# Patient Record
Sex: FEMALE | Race: WHITE | NOT HISPANIC OR LATINO | Employment: OTHER | URBAN - NONMETROPOLITAN AREA
[De-identification: names, ages, dates, MRNs, and addresses within clinical notes are randomized per-mention and may not be internally consistent; named-entity substitution may affect disease eponyms.]

---

## 2017-01-20 ENCOUNTER — TRANSCRIBE ORDERS (OUTPATIENT)
Dept: CARDIOLOGY | Facility: CLINIC | Age: 72
End: 2017-01-20

## 2017-01-20 DIAGNOSIS — I35.1 NONRHEUMATIC AORTIC VALVE INSUFFICIENCY: ICD-10-CM

## 2017-01-20 DIAGNOSIS — I35.9 AVD (AORTIC VALVE DISEASE): Primary | ICD-10-CM

## 2017-01-20 DIAGNOSIS — R00.2 PALPITATIONS: ICD-10-CM

## 2017-01-20 DIAGNOSIS — I47.1 SVT (SUPRAVENTRICULAR TACHYCARDIA) (HCC): ICD-10-CM

## 2017-03-15 DIAGNOSIS — E78.5 HYPERLIPIDEMIA, UNSPECIFIED HYPERLIPIDEMIA TYPE: Primary | ICD-10-CM

## 2017-03-15 DIAGNOSIS — I49.9 CARDIAC ARRHYTHMIA, UNSPECIFIED CARDIAC ARRHYTHMIA TYPE: ICD-10-CM

## 2017-03-15 LAB
ALBUMIN SERPL-MCNC: 4.2 G/DL (ref 3.2–5.5)
ALBUMIN/GLOB SERPL: 1.1 G/DL (ref 1–3)
ALP SERPL-CCNC: 111 U/L (ref 15–121)
ALT SERPL W P-5'-P-CCNC: 15 U/L (ref 10–60)
ANION GAP SERPL CALCULATED.3IONS-SCNC: 9 MMOL/L (ref 5–15)
AST SERPL-CCNC: 21 U/L (ref 10–60)
BASOPHILS # BLD AUTO: 0.03 10*3/MM3 (ref 0–0.2)
BASOPHILS NFR BLD AUTO: 0.5 % (ref 0–2)
BILIRUB SERPL-MCNC: 0.8 MG/DL (ref 0.2–1)
BUN BLD-MCNC: 12 MG/DL (ref 8–25)
BUN/CREAT SERPL: 15 (ref 7–25)
CALCIUM SPEC-SCNC: 10.1 MG/DL (ref 8.4–10.8)
CHLORIDE SERPL-SCNC: 105 MMOL/L (ref 100–112)
CHOLEST SERPL-MCNC: 235 MG/DL (ref 150–200)
CO2 SERPL-SCNC: 27 MMOL/L (ref 20–32)
CREAT BLD-MCNC: 0.8 MG/DL (ref 0.4–1.3)
DEPRECATED RDW RBC AUTO: 42.6 FL (ref 36.4–46.3)
EOSINOPHIL # BLD AUTO: 0.14 10*3/MM3 (ref 0–0.7)
EOSINOPHIL NFR BLD AUTO: 2.5 % (ref 0–7)
ERYTHROCYTE [DISTWIDTH] IN BLOOD BY AUTOMATED COUNT: 12.6 % (ref 11.5–14.5)
GFR SERPL CREATININE-BSD FRML MDRD: 71 ML/MIN/1.73 (ref 45–104)
GLOBULIN UR ELPH-MCNC: 3.7 GM/DL (ref 2.5–4.6)
GLUCOSE BLD-MCNC: 92 MG/DL (ref 70–100)
HCT VFR BLD AUTO: 39.2 % (ref 35–45)
HDLC SERPL-MCNC: 95 MG/DL (ref 35–100)
HGB BLD-MCNC: 12.8 G/DL (ref 12–15.5)
LDLC SERPL CALC-MCNC: 116 MG/DL
LDLC/HDLC SERPL: 1.23 {RATIO}
LYMPHOCYTES # BLD AUTO: 1.97 10*3/MM3 (ref 0.6–4.2)
LYMPHOCYTES NFR BLD AUTO: 34.6 % (ref 10–50)
MCH RBC QN AUTO: 31.1 PG (ref 26.5–34)
MCHC RBC AUTO-ENTMCNC: 32.7 G/DL (ref 31.4–36)
MCV RBC AUTO: 95.4 FL (ref 80–98)
MONOCYTES # BLD AUTO: 0.74 10*3/MM3 (ref 0–0.9)
MONOCYTES NFR BLD AUTO: 13 % (ref 0–12)
NEUTROPHILS # BLD AUTO: 2.82 10*3/MM3 (ref 2–8.6)
NEUTROPHILS NFR BLD AUTO: 49.4 % (ref 37–80)
PLATELET # BLD AUTO: 319 10*3/MM3 (ref 150–450)
PMV BLD AUTO: 10 FL (ref 8–12)
POTASSIUM BLD-SCNC: 3.8 MMOL/L (ref 3.4–5.4)
PROT SERPL-MCNC: 7.9 G/DL (ref 6.7–8.2)
RBC # BLD AUTO: 4.11 10*6/MM3 (ref 3.77–5.16)
SODIUM BLD-SCNC: 141 MMOL/L (ref 134–146)
TRIGL SERPL-MCNC: 118 MG/DL (ref 35–160)
VLDLC SERPL-MCNC: 23.6 MG/DL
WBC NRBC COR # BLD: 5.7 10*3/MM3 (ref 3.2–9.8)

## 2017-03-15 PROCEDURE — 36415 COLL VENOUS BLD VENIPUNCTURE: CPT | Performed by: INTERNAL MEDICINE

## 2017-03-15 PROCEDURE — 80053 COMPREHEN METABOLIC PANEL: CPT | Performed by: INTERNAL MEDICINE

## 2017-03-15 PROCEDURE — 85025 COMPLETE CBC W/AUTO DIFF WBC: CPT | Performed by: INTERNAL MEDICINE

## 2017-03-15 PROCEDURE — 84443 ASSAY THYROID STIM HORMONE: CPT | Performed by: INTERNAL MEDICINE

## 2017-03-15 PROCEDURE — 80061 LIPID PANEL: CPT | Performed by: INTERNAL MEDICINE

## 2017-03-16 LAB — TSH SERPL DL<=0.05 MIU/L-ACNC: 5.22 MIU/ML (ref 0.46–4.68)

## 2017-03-22 ENCOUNTER — OFFICE VISIT (OUTPATIENT)
Dept: FAMILY MEDICINE CLINIC | Facility: CLINIC | Age: 72
End: 2017-03-22

## 2017-03-22 VITALS
SYSTOLIC BLOOD PRESSURE: 140 MMHG | DIASTOLIC BLOOD PRESSURE: 70 MMHG | HEART RATE: 80 BPM | HEIGHT: 63 IN | TEMPERATURE: 97.9 F | BODY MASS INDEX: 24.84 KG/M2 | WEIGHT: 140.2 LBS

## 2017-03-22 DIAGNOSIS — I10 ESSENTIAL HYPERTENSION: Primary | ICD-10-CM

## 2017-03-22 DIAGNOSIS — K21.9 GASTROESOPHAGEAL REFLUX DISEASE, ESOPHAGITIS PRESENCE NOT SPECIFIED: ICD-10-CM

## 2017-03-22 DIAGNOSIS — E78.5 HYPERLIPIDEMIA, UNSPECIFIED HYPERLIPIDEMIA TYPE: ICD-10-CM

## 2017-03-22 DIAGNOSIS — M85.80 OSTEOPENIA: ICD-10-CM

## 2017-03-22 DIAGNOSIS — M17.12 OSTEOARTHRITIS OF LEFT KNEE, UNSPECIFIED OSTEOARTHRITIS TYPE: ICD-10-CM

## 2017-03-22 DIAGNOSIS — F41.1 GENERALIZED ANXIETY DISORDER: ICD-10-CM

## 2017-03-22 PROCEDURE — 99214 OFFICE O/P EST MOD 30 MIN: CPT | Performed by: INTERNAL MEDICINE

## 2017-03-22 NOTE — PROGRESS NOTES
Subjective     Supriya Alberto is a 71 y.o. female.     History of Present Illness   Supriya is here for annual followup of high cholesterol, PSVT, GERD/gastritis, osteopenia, generalized anxiety disorder, and dysthymia, among other issues.  Next DEXA will be due 3/2018.    PSVT is well controlled.  No further syncopal episodes.  Dr. Aguirre performed successful pathway ablation 2/2015.  She continues on metoprolol.  Dr. Aguirre continues to follow her annually.  Dr. Hines performed extensive ischemic workup prior to the pathway ablation.  Left heart catheter was unremarkable.    Her blood pressure is adequately controlled today.  Heart rate is normal.  Her weight is down 5 pounds in the past year.    She reports increasing arthritic symptoms of the left knee.  She denies any falls or trauma or instability.  She uses ibuprofen episodically with fairly good results.  Left knee pain is moderate in intensity and episodic.  We discussed trying Osteo Bi-Flex for symptom relief.    She desires mammograms to be a two-year intervals.    Mammogram will be due in spring 2018.    Her labs are all reviewed with results listed below.  Although total cholesterol is elevated, her ratios are excellent due to high HDL cholesterol.  Triglycerides are normal.  CMP and CBC are unremarkable.  TSH is slightly elevated at 5.2.    Review of Systems   Constitutional: Negative for chills, fatigue and fever.   HENT: Negative for congestion, ear pain, postnasal drip, sinus pressure and sore throat.    Respiratory: Negative for cough, shortness of breath and wheezing.    Cardiovascular: Negative for chest pain, palpitations and leg swelling.   Gastrointestinal: Negative for abdominal pain, blood in stool, constipation, diarrhea, nausea and vomiting.   Endocrine: Negative for cold intolerance, heat intolerance, polydipsia and polyuria.   Genitourinary: Negative for dysuria, frequency, hematuria and urgency.   Musculoskeletal: Arthralgias: left knee  "pain.   Skin: Negative for rash.   Neurological: Negative for syncope and weakness.       Objective     /70  Pulse 80  Temp 97.9 °F (36.6 °C) (Oral)   Ht 63\" (160 cm)  Wt 140 lb 3.2 oz (63.6 kg)  BMI 24.84 kg/m2    Physical Exam   Constitutional: She is oriented to person, place, and time. She appears well-developed and well-nourished. No distress.   HENT:   Head: Normocephalic and atraumatic.   Nose: Right sinus exhibits no maxillary sinus tenderness and no frontal sinus tenderness. Left sinus exhibits no maxillary sinus tenderness and no frontal sinus tenderness.   Mouth/Throat: Uvula is midline, oropharynx is clear and moist and mucous membranes are normal. No oral lesions. No tonsillar exudate.   Eyes: Conjunctivae and EOM are normal. Pupils are equal, round, and reactive to light.   Neck: Trachea normal. Neck supple. No JVD present. Carotid bruit is not present. No tracheal deviation present. No thyroid mass and no thyromegaly present.   Cardiovascular: Normal rate, regular rhythm and normal heart sounds.   No extrasystoles are present. PMI is not displaced.    No murmur heard.  Pulmonary/Chest: Effort normal and breath sounds normal. No accessory muscle usage. No respiratory distress. She has no decreased breath sounds. She has no wheezes. She has no rhonchi. She has no rales.   Abdominal: Soft. Bowel sounds are normal. She exhibits no distension. There is no hepatosplenomegaly. There is no tenderness.   Mildly overweight abdomen.   Musculoskeletal:   Exam of the left knee reveals arthritic changes and some crepitance, but full range of motion.  No warmth, erythema, or effusion.       Vascular Status -  Her exam exhibits right foot vasculature normal. Her exam exhibits no right foot edema. Her exam exhibits left foot vasculature normal. Her exam exhibits no left foot edema.  Lymphadenopathy:     She has no cervical adenopathy.   Neurological: She is alert and oriented to person, place, and time. No " cranial nerve deficit. Coordination normal.   Skin: Skin is warm, dry and intact. No rash noted. No cyanosis. Nails show no clubbing.   Psychiatric: She has a normal mood and affect. Her speech is normal and behavior is normal. Thought content normal.   Vitals reviewed.      PHQ-9 Depression Screening 3/22/2017   Little interest or pleasure in doing things 1   Feeling down, depressed, or hopeless 1   Trouble falling or staying asleep, or sleeping too much 1   Feeling tired or having little energy 1   Poor appetite or overeating 0   Feeling bad about yourself - or that you are a failure or have let yourself or your family down 0   Trouble concentrating on things, such as reading the newspaper or watching television 0   Moving or speaking so slowly that other people could have noticed. Or the opposite - being so fidgety or restless that you have been moving around a lot more than usual 0   Thoughts that you would be better off dead, or of hurting yourself in some way 1   PHQ-9 Total Score 5   If you checked off any problems, how difficult have these problems made it for you to do your work, take care of things at home, or get along with other people? Not difficult at all       Assessment/Plan   Continue current medications.  Continue the weight loss efforts.  Pursue regular exercise.    X-rays of the left knee today as the patient leaves.  Try Osteo Bi-Flex.  She may continue to use Tylenol or OTC NSAIDs episodically for symptom relief.    Return to clinic in 12 months with fasting labs prior.  She will be due for mammogram and DEXA in the spring of 2018.      Diagnoses and all orders for this visit:    Essential hypertension  -     CBC Auto Differential; Future  -     Comprehensive Metabolic Panel; Future  -     TSH; Future    Hyperlipidemia, unspecified hyperlipidemia type  -     CBC Auto Differential; Future  -     Comprehensive Metabolic Panel; Future  -     Lipid Panel; Future  -     TSH;  Future    Gastroesophageal reflux disease, esophagitis presence not specified  -     TSH; Future    Osteopenia  -     Vitamin D 25 Hydroxy; Future  -     TSH; Future  -     HM DEXA SCAN  -     Vitamin D 25 Hydroxy; Future    Generalized anxiety disorder  -     TSH; Future    Osteoarthritis of left knee, unspecified osteoarthritis type  -     XR Knee 3 View Left  -     TSH; Future      Orders Only on 03/15/2017   Component Date Value Ref Range Status   • Glucose 03/15/2017 92  70 - 100 mg/dL Final   • BUN 03/15/2017 12  8 - 25 mg/dL Final   • Creatinine 03/15/2017 0.80  0.40 - 1.30 mg/dL Final   • Sodium 03/15/2017 141  134 - 146 mmol/L Final   • Potassium 03/15/2017 3.8  3.4 - 5.4 mmol/L Final   • Chloride 03/15/2017 105  100 - 112 mmol/L Final   • CO2 03/15/2017 27.0  20.0 - 32.0 mmol/L Final   • Calcium 03/15/2017 10.1  8.4 - 10.8 mg/dL Final   • Total Protein 03/15/2017 7.9  6.7 - 8.2 g/dL Final   • Albumin 03/15/2017 4.20  3.20 - 5.50 g/dL Final   • ALT (SGPT) 03/15/2017 15  10 - 60 U/L Final   • AST (SGOT) 03/15/2017 21  10 - 60 U/L Final   • Alkaline Phosphatase 03/15/2017 111  15 - 121 U/L Final   • Total Bilirubin 03/15/2017 0.8  0.2 - 1.0 mg/dL Final   • eGFR Non African Amer 03/15/2017 71  45 - 104 mL/min/1.73 Final   • Globulin 03/15/2017 3.7  2.5 - 4.6 gm/dL Final   • A/G Ratio 03/15/2017 1.1  1.0 - 3.0 g/dL Final   • BUN/Creatinine Ratio 03/15/2017 15.0  7.0 - 25.0 Final   • Anion Gap 03/15/2017 9.0  5.0 - 15.0 mmol/L Final   • Total Cholesterol 03/15/2017 235* 150 - 200 mg/dL Final   • Triglycerides 03/15/2017 118  35 - 160 mg/dL Final   • HDL Cholesterol 03/15/2017 95  35 - 100 mg/dL Final   • LDL Cholesterol  03/15/2017 116  mg/dL Final   • VLDL Cholesterol 03/15/2017 23.6  mg/dL Final   • LDL/HDL Ratio 03/15/2017 1.23   Final   • TSH 03/15/2017 5.220* 0.460 - 4.680 mIU/mL Final   • WBC 03/15/2017 5.70  3.20 - 9.80 10*3/mm3 Final   • RBC 03/15/2017 4.11  3.77 - 5.16 10*6/mm3 Final   • Hemoglobin  03/15/2017 12.8  12.0 - 15.5 g/dL Final   • Hematocrit 03/15/2017 39.2  35.0 - 45.0 % Final   • MCV 03/15/2017 95.4  80.0 - 98.0 fL Final   • MCH 03/15/2017 31.1  26.5 - 34.0 pg Final   • MCHC 03/15/2017 32.7  31.4 - 36.0 g/dL Final   • RDW 03/15/2017 12.6  11.5 - 14.5 % Final   • RDW-SD 03/15/2017 42.6  36.4 - 46.3 fl Final   • MPV 03/15/2017 10.0  8.0 - 12.0 fL Final   • Platelets 03/15/2017 319  150 - 450 10*3/mm3 Final   • Neutrophil % 03/15/2017 49.4  37.0 - 80.0 % Final   • Lymphocyte % 03/15/2017 34.6  10.0 - 50.0 % Final   • Monocyte % 03/15/2017 13.0* 0.0 - 12.0 % Final   • Eosinophil % 03/15/2017 2.5  0.0 - 7.0 % Final   • Basophil % 03/15/2017 0.5  0.0 - 2.0 % Final   • Neutrophils, Absolute 03/15/2017 2.82  2.00 - 8.60 10*3/mm3 Final   • Lymphocytes, Absolute 03/15/2017 1.97  0.60 - 4.20 10*3/mm3 Final   • Monocytes, Absolute 03/15/2017 0.74  0.00 - 0.90 10*3/mm3 Final   • Eosinophils, Absolute 03/15/2017 0.14  0.00 - 0.70 10*3/mm3 Final   • Basophils, Absolute 03/15/2017 0.03  0.00 - 0.20 10*3/mm3 Final   ]

## 2017-04-03 RX ORDER — PAROXETINE HYDROCHLORIDE 20 MG/1
TABLET, FILM COATED ORAL
Qty: 30 TABLET | Refills: 0 | Status: SHIPPED | OUTPATIENT
Start: 2017-04-03 | End: 2017-05-03 | Stop reason: SDUPTHER

## 2017-04-03 RX ORDER — OMEPRAZOLE 40 MG/1
CAPSULE, DELAYED RELEASE ORAL
Qty: 30 CAPSULE | Refills: 0 | Status: SHIPPED | OUTPATIENT
Start: 2017-04-03 | End: 2017-05-03 | Stop reason: SDUPTHER

## 2017-04-12 ENCOUNTER — DOCUMENTATION (OUTPATIENT)
Dept: CARDIOLOGY | Facility: CLINIC | Age: 72
End: 2017-04-12

## 2017-04-14 ENCOUNTER — OFFICE VISIT (OUTPATIENT)
Dept: CARDIOLOGY | Facility: CLINIC | Age: 72
End: 2017-04-14

## 2017-04-14 VITALS
HEIGHT: 63 IN | BODY MASS INDEX: 24.63 KG/M2 | DIASTOLIC BLOOD PRESSURE: 72 MMHG | WEIGHT: 139 LBS | HEART RATE: 64 BPM | SYSTOLIC BLOOD PRESSURE: 140 MMHG

## 2017-04-14 DIAGNOSIS — R55 VASOVAGAL SYNCOPE: Primary | ICD-10-CM

## 2017-04-14 DIAGNOSIS — I51.9 MILD DIASTOLIC DYSFUNCTION: ICD-10-CM

## 2017-04-14 DIAGNOSIS — I47.1 SVT (SUPRAVENTRICULAR TACHYCARDIA) (HCC): ICD-10-CM

## 2017-04-14 DIAGNOSIS — E78.5 HYPERLIPIDEMIA, UNSPECIFIED HYPERLIPIDEMIA TYPE: ICD-10-CM

## 2017-04-14 DIAGNOSIS — I34.0 NON-RHEUMATIC MITRAL REGURGITATION: ICD-10-CM

## 2017-04-14 DIAGNOSIS — I35.1 NONRHEUMATIC AORTIC VALVE INSUFFICIENCY: ICD-10-CM

## 2017-04-14 PROCEDURE — 93000 ELECTROCARDIOGRAM COMPLETE: CPT | Performed by: INTERNAL MEDICINE

## 2017-04-14 PROCEDURE — 99213 OFFICE O/P EST LOW 20 MIN: CPT | Performed by: INTERNAL MEDICINE

## 2017-04-14 NOTE — PROGRESS NOTES
Supriya Alberto  71 y.o. female    04/14/2017  1. Vasovagal syncope    2. Hyperlipidemia, unspecified hyperlipidemia type    3. Non-rheumatic mitral regurgitation    4. Mild diastolic dysfunction    5. Nonrheumatic aortic valve insufficiency    6. SVT (supraventricular tachycardia)        History of Present Illness: Routine follow-up with history of for the aortic mitral and tricuspid regurgitation and chest pain pleased to be atypical    71 years old patient to underwent cardiac catheterization noted to have normal coronaries with normal left and a systolic function.  Patient is symptomatic supraventricular tachycardia underwent EP study and successful slow pathway ablation.  No further tachycardia was reported.  Recent echocardiographic study finding discussed the patient.  Normal left and a systolic function with a mild mitral aortic and tricuspid regurgitation.  There is a moderate mitral regurgitation.  Patient denies orthopnea, PND, nauseous, vomiting, diarrhea.  Denies intermittent claudication.  Denies any palpitation or fluttering.  Denies any syncope or near syncopal episode.            SUBJECTIVE    Allergies   Allergen Reactions   • Crestor [Rosuvastatin Calcium] Myalgia   • Adhesive Tape Rash   • Latex Rash         Past Medical History:   Diagnosis Date   • Allergic rhinitis    • Angina pectoris    • Constipation    • Depressive disorder    • Diverticulitis of sigmoid colon    • Dizziness    • Eustachian tube disorder    • Jw hematuria    • Generalized anxiety disorder    • GERD (gastroesophageal reflux disease)    • Hyperlipidemia    • Malaise and fatigue    • Menopause    • Mild diastolic dysfunction    • Mitral valve regurgitation-moderate    • Osteopenia    • Palpitations    • Paroxysmal supraventricular tachycardia    • Prolapse of vaginal vault after hysterectomy    • Rosacea    • Syncope          Past Surgical History:   Procedure Laterality Date   • CARDIAC ABLATION  02/2015    SVT pathway  "ablation, Dr. Aguirre   • CARDIAC CATHETERIZATION  11/12/2014    Normal coronaries. Normal LV systolic function   • COLPOPEXY VAGINAL  01/28/2013    laparoscopic uterisacral colpopexy (intraperitoneal) Lysis of adhesions (took 1.5 hrs of 2.5 hrs spent laparoscopically ) Tension free vaginal tape midurethral sling Posterior colporrhaphy. Perineorrhaphy. Cystourethroscopy   • ENDOSCOPY AND COLONOSCOPY  04/2014    No polyps. Minimal diverticulosis (sigmoid). Dr Louise   • HEMORRHOIDECTOMY  2005         Family History   Problem Relation Age of Onset   • Lung cancer Father    • Heart disease Other          Social History     Social History   • Marital status:      Spouse name: N/A   • Number of children: N/A   • Years of education: N/A     Occupational History   • Not on file.     Social History Main Topics   • Smoking status: Never Smoker   • Smokeless tobacco: Never Used   • Alcohol use No   • Drug use: No   • Sexual activity: Defer     Other Topics Concern   • Not on file     Social History Narrative         Current Outpatient Prescriptions   Medication Sig Dispense Refill   • Calcium Carbonate-Vit D-Min (CALTRATE 600+D PLUS PO) Take 1 tablet by mouth 2 (Two) Times a Day.     • CARTIA  MG 24 hr capsule TAKE ONE CAPSULE BY MOUTH ONCE DAILY 30 capsule 6   • loratadine (CLARITIN) 10 MG tablet Take 10 mg by mouth Every Night.     • lovastatin (MEVACOR) 20 MG tablet Take 20 mg by mouth Every Night.     • Misc Natural Products (OSTEO BI-FLEX ADV JOINT SHIELD PO) Take 1 tablet by mouth Daily.     • omeprazole (priLOSEC) 40 MG capsule TAKE ONE CAPSULE BY MOUTH ONCE DAILY IN THE MORNING FOR REFLUX 30 capsule 0   • PARoxetine (PAXIL) 20 MG tablet TAKE ONE TABLET BY MOUTH ONCE DAILY AT BEDTIME 30 tablet 0     No current facility-administered medications for this visit.          OBJECTIVE    /72  Pulse 64  Ht 63\" (160 cm)  Wt 139 lb (63 kg)  BMI 24.62 kg/m2        Review of Systems     Constitutional:  " Denies recent weight loss, weight gain, fever or chills, no change in exercise tolerance     HENT:  Denies any hearing loss, epistaxis, hoarseness, or difficulty speaking.     Eyes: Wears eyeglasses or contact lenses     Respiratory:  Denies dyspnea with exertion,no cough, wheezing, or hemoptysis.     Cardiovascular: See H&P.     Gastrointestinal:  Denies change in bowel habits, dyspepsia, ulcer disease, hematochezia, or melena.     Endocrine: Negative for cold intolerance, heat intolerance, polydipsia, polyphagia and polyuria. Denies any history of weight change, heat/cold intolerance, polydipsia, polyuria     Genitourinary: Negative.      Musculoskeletal: Denies any history of arthritic symptoms or back problems     Skin:  Denies any change in hair or nails, rashes, or skin lesions.     Allergic/Immunologic: Negative.  Negative for environmental allergies, food allergies and immunocompromised state.     Neurological:  Denies any history of recurrent headaches, strokes, TIA, or seizure disorder.     Hematological: Denies any food allergies, seasonal allergies, bleeding disorders, or lymphadenopathy.     Psychiatric/Behavioral: Denies any history of depression, substance abuse, or change in cognitive function.         Physical Exam     Constitutional: Cooperative, alert and oriented, well-developed, well-nourished, in no acute distress.     HENT:   Head: Normocephalic, normal hair patterns, no masses or tenderness.  Ears, Nose, and Throat: No gross abnormalities. No pallor or cyanosis. Dentition good.   Eyes: EOMS intact, PERRL, conjunctivae and lids unremarkable. Fundoscopic exam and visual fields not performed.   Neck: No palpable masses or adenopathy, no thyromegaly, no JVD, carotid pulses are full and equal bilaterally and without  Bruits.     Cardiovascular: Regular rhythm, S1 and S2 normal, no S3 or S4. Apical impulse not displaced. No murmurs, gallops, or rubs detected.     Pulmonary/Chest: Chest: normal  symmetry, no tenderness to palpation, normal respiratory excursion, no intercostal retraction, no use of accessory muscles.            Pulmonary: Normal breath sounds. No rales or ronchi.    Abdominal: Abdomen soft, bowel sounds normoactive, no masses, no hepatosplenomegaly, non-tender, no bruits.     Musculoskeletal: No deformities, clubbing, cyanosis, erythema, or edema observed. There are no spinal abnormalities noted. Normal muscle strength and tone. Pulses full and equal in all extremities, no bruits auscultated.     Neurological: No gross motor or sensory deficits noted, affect appropriate, oriented to time, person, place.     Skin: Warm and dry to the touch, no apparent skin lesions or masses noted.     Psychiatric: She has a normal mood and affect. Her behavior is normal. Judgment and thought content normal.           ECG 12 Lead  Date/Time: 4/14/2017 11:36 AM  Performed by: MARISOL RIVERA  Authorized by: MARISOL RIVERA   Comparison: not compared with previous ECG   Rhythm: sinus rhythm  Comments: Sinus rhythm with a left atrial enlargement and borderline prolonged VA interval              Lab Results   Component Value Date    WBC 5.70 03/15/2017    HGB 12.8 03/15/2017    HCT 39.2 03/15/2017    MCV 95.4 03/15/2017     03/15/2017     Lab Results   Component Value Date    GLUCOSE 92 03/15/2017    BUN 12 03/15/2017    CREATININE 0.80 03/15/2017    EGFRIFNONA 71 03/15/2017    BCR 15.0 03/15/2017    CO2 27.0 03/15/2017    CALCIUM 10.1 03/15/2017    ALBUMIN 4.20 03/15/2017    LABIL2 1.1 03/15/2017    AST 21 03/15/2017    ALT 15 03/15/2017     Lab Results   Component Value Date    CHOL 235 (H) 03/15/2017     Lab Results   Component Value Date    TRIG 118 03/15/2017    TRIG 132 03/14/2016    TRIG 177 (H) 03/11/2015     Lab Results   Component Value Date    HDL 95 03/15/2017    HDL 75.1 03/14/2016    HDL 72.0 03/11/2015     Lab Results   Component Value Date    LDLCALC 116 03/15/2017    LDLCALC 172  03/14/2016    LDLCALC 174 03/11/2015     No results found for: LDL  No results found for: HDLLDLRATIO  No components found for: CHOLHDL  No results found for: HGBA1C  Lab Results   Component Value Date    TSH 5.220 (H) 03/15/2017           ASSESSMENT AND PLAN    #1 supraventricular tachycardia status post EP study and ablation.  #2 moderate mitral, mild aortic and mild tricuspid regurgitations.  #3 history of chest pain atypical with a normal cardia catheterizations    Clinically there is no sign of any acute cardiac decompensation based on the clinical history physical finding.  Evidence of active ischemia.  We'll see her back in one year R depends on patient clinical conditions.  She will continue omeprazole with history of gastritis lovastatin for management of hyperlipidemia.  Diagnoses and all orders for this visit:    Vasovagal syncope    Hyperlipidemia, unspecified hyperlipidemia type    Non-rheumatic mitral regurgitation    Mild diastolic dysfunction    Nonrheumatic aortic valve insufficiency    SVT (supraventricular tachycardia)  -     ECG 12 Lead        Natalia Aguirre MD  4/14/2017  11:33 AM

## 2017-04-24 RX ORDER — LOVASTATIN 20 MG/1
TABLET ORAL
Qty: 90 TABLET | Refills: 0 | Status: SHIPPED | OUTPATIENT
Start: 2017-04-24 | End: 2017-11-18 | Stop reason: SDUPTHER

## 2017-05-03 RX ORDER — PAROXETINE HYDROCHLORIDE 20 MG/1
TABLET, FILM COATED ORAL
Qty: 30 TABLET | Refills: 0 | Status: SHIPPED | OUTPATIENT
Start: 2017-05-03 | End: 2017-05-31 | Stop reason: SDUPTHER

## 2017-05-03 RX ORDER — OMEPRAZOLE 40 MG/1
CAPSULE, DELAYED RELEASE ORAL
Qty: 30 CAPSULE | Refills: 0 | Status: SHIPPED | OUTPATIENT
Start: 2017-05-03 | End: 2017-05-31 | Stop reason: SDUPTHER

## 2017-06-01 RX ORDER — OMEPRAZOLE 40 MG/1
CAPSULE, DELAYED RELEASE ORAL
Qty: 30 CAPSULE | Refills: 0 | Status: SHIPPED | OUTPATIENT
Start: 2017-06-01 | End: 2017-06-30 | Stop reason: SDUPTHER

## 2017-06-01 RX ORDER — PAROXETINE HYDROCHLORIDE 20 MG/1
TABLET, FILM COATED ORAL
Qty: 30 TABLET | Refills: 0 | Status: SHIPPED | OUTPATIENT
Start: 2017-06-01 | End: 2017-06-30 | Stop reason: SDUPTHER

## 2017-06-22 RX ORDER — DILTIAZEM HYDROCHLORIDE 120 MG/1
CAPSULE, EXTENDED RELEASE ORAL
Qty: 30 CAPSULE | Refills: 6 | Status: SHIPPED | OUTPATIENT
Start: 2017-06-22 | End: 2018-01-21 | Stop reason: SDUPTHER

## 2017-06-30 RX ORDER — PAROXETINE HYDROCHLORIDE 20 MG/1
TABLET, FILM COATED ORAL
Qty: 30 TABLET | Refills: 11 | Status: SHIPPED | OUTPATIENT
Start: 2017-06-30 | End: 2018-06-30 | Stop reason: SDUPTHER

## 2017-06-30 RX ORDER — OMEPRAZOLE 40 MG/1
CAPSULE, DELAYED RELEASE ORAL
Qty: 30 CAPSULE | Refills: 11 | Status: SHIPPED | OUTPATIENT
Start: 2017-06-30 | End: 2018-03-22

## 2017-11-20 RX ORDER — LOVASTATIN 20 MG/1
TABLET ORAL
Qty: 90 TABLET | Refills: 1 | Status: SHIPPED | OUTPATIENT
Start: 2017-11-20 | End: 2018-07-26

## 2018-01-09 ENCOUNTER — OFFICE VISIT (OUTPATIENT)
Dept: FAMILY MEDICINE CLINIC | Facility: CLINIC | Age: 73
End: 2018-01-09

## 2018-01-09 VITALS
OXYGEN SATURATION: 94 % | HEIGHT: 64 IN | WEIGHT: 140.8 LBS | TEMPERATURE: 97.9 F | SYSTOLIC BLOOD PRESSURE: 150 MMHG | DIASTOLIC BLOOD PRESSURE: 70 MMHG | HEART RATE: 59 BPM | BODY MASS INDEX: 24.04 KG/M2

## 2018-01-09 DIAGNOSIS — J06.9 VIRAL URI WITH COUGH: ICD-10-CM

## 2018-01-09 DIAGNOSIS — J11.1 INFLUENZA: ICD-10-CM

## 2018-01-09 DIAGNOSIS — R50.9 FEVER, UNSPECIFIED FEVER CAUSE: Primary | ICD-10-CM

## 2018-01-09 LAB
FLUAV AG NPH QL: NEGATIVE
FLUBV AG NPH QL IA: NEGATIVE

## 2018-01-09 PROCEDURE — 99213 OFFICE O/P EST LOW 20 MIN: CPT | Performed by: INTERNAL MEDICINE

## 2018-01-09 PROCEDURE — 87804 INFLUENZA ASSAY W/OPTIC: CPT | Performed by: INTERNAL MEDICINE

## 2018-01-09 PROCEDURE — 96372 THER/PROPH/DIAG INJ SC/IM: CPT | Performed by: INTERNAL MEDICINE

## 2018-01-09 RX ORDER — METHYLPREDNISOLONE ACETATE 80 MG/ML
80 INJECTION, SUSPENSION INTRA-ARTICULAR; INTRALESIONAL; INTRAMUSCULAR; SOFT TISSUE ONCE
Status: COMPLETED | OUTPATIENT
Start: 2018-01-09 | End: 2018-01-09

## 2018-01-09 RX ORDER — PHENYLEPHRINE HCL 10 MG/1
TABLET, FILM COATED ORAL
Qty: 36 TABLET | Refills: 0 | Status: SHIPPED | OUTPATIENT
Start: 2018-01-09 | End: 2018-01-29 | Stop reason: SDUPTHER

## 2018-01-09 RX ORDER — OSELTAMIVIR PHOSPHATE 75 MG/1
75 CAPSULE ORAL
Qty: 10 CAPSULE | Refills: 0 | Status: SHIPPED | OUTPATIENT
Start: 2018-01-09 | End: 2018-01-14

## 2018-01-09 RX ORDER — TRIAMCINOLONE ACETONIDE 40 MG/ML
20 INJECTION, SUSPENSION INTRA-ARTICULAR; INTRAMUSCULAR ONCE
Status: COMPLETED | OUTPATIENT
Start: 2018-01-09 | End: 2018-01-09

## 2018-01-09 RX ADMIN — TRIAMCINOLONE ACETONIDE 20 MG: 40 INJECTION, SUSPENSION INTRA-ARTICULAR; INTRAMUSCULAR at 16:32

## 2018-01-09 RX ADMIN — METHYLPREDNISOLONE ACETATE 80 MG: 80 INJECTION, SUSPENSION INTRA-ARTICULAR; INTRALESIONAL; INTRAMUSCULAR; SOFT TISSUE at 16:31

## 2018-01-09 NOTE — PROGRESS NOTES
"Subjective      History of Present Illness       Supriya Alberto is a 72 y.o. female reporting sudden onset of moderate headache and fatigue yesterday with fever and chills, body aches, scratchy throat, and cough productive of clear sputum today.  Nasal secretions are clear.  Clinically, symptoms are consistent with influenza, although, influenza screen is negative for Type A and B today.  She had a flu vaccine 09/2017.  She has been exposed to family members with symptoms consistent with influenza as well.     Review of Systems   Constitutional: Positive for chills, fatigue and fever.   HENT: Negative for congestion, ear pain, postnasal drip, sinus pressure and sore throat.    Respiratory: Positive for cough. Negative for shortness of breath and wheezing.    Cardiovascular: Negative for chest pain, palpitations and leg swelling.   Gastrointestinal: Negative for abdominal pain, blood in stool, constipation, diarrhea, nausea and vomiting.   Endocrine: Negative for cold intolerance, heat intolerance, polydipsia and polyuria.   Genitourinary: Negative for dysuria, frequency, hematuria and urgency.   Musculoskeletal: Positive for myalgias.   Skin: Negative for rash.   Neurological: Positive for headaches. Negative for syncope and weakness.        Objective     Vitals:    01/09/18 1540   BP: 150/70   Pulse: 59   Temp: 97.9 °F (36.6 °C)   TempSrc: Oral   SpO2: 94%   Weight: 63.9 kg (140 lb 12.8 oz)   Height: 162.6 cm (64\")     Physical Exam   Constitutional: She is oriented to person, place, and time. She appears well-developed and well-nourished. No distress.   HENT:   Head: Normocephalic and atraumatic.   Nose: Right sinus exhibits maxillary sinus tenderness. Left sinus exhibits maxillary sinus tenderness.   Mouth/Throat: Oropharynx is clear and moist. No oropharyngeal exudate.   Mild posterior erythema and clear postnasal drip.  Nasal congestion and mild maxillary sinus tenderness.       Eyes: EOM are normal. Pupils are " equal, round, and reactive to light.   Neck: Neck supple. No JVD present. No thyromegaly present.   Cardiovascular: Normal rate, regular rhythm and normal heart sounds.    Pulmonary/Chest: Effort normal. No accessory muscle usage. No respiratory distress. She has no wheezes. She has rhonchi. She has no rales.   A few rhonchi.    Abdominal: Soft. Bowel sounds are normal. She exhibits no distension. There is no tenderness.   Musculoskeletal: She exhibits no edema.   Lymphadenopathy:     She has no cervical adenopathy.   Neurological: She is alert and oriented to person, place, and time. No cranial nerve deficit.   Psychiatric: She has a normal mood and affect. Her speech is normal and behavior is normal.     Future Appointments  Date Time Provider Department Center   3/22/2018 8:45 AM Feliz Valiente MD [facility] None   4/20/2018 10:30 AM Natalia Aguirre MD [facility] None         Assessment/Plan      A prescription is sent for Tamiflu 75 mg one b.i.d. X 5 days and Sudafed PE 10 mg to take 1 po 2-3 times daily prn congestion .  Plenty of rest and increase liquids.  Continue to use Delsym cough syrup as needed.    After informed verbal consent, patient is given Kenalog 20 mg and Depo-Medrol 80 mg IM without difficulty or complications.  Patient tolerated well without complications.       Scribed for Dr. Valiente by Samina Field [facility].     Diagnoses and all orders for this visit:    Fever, unspecified fever cause  -     Influenza Antigen, Rapid - Swab, Nasopharynx  -     triamcinolone acetonide (KENALOG-40) injection 20 mg; Inject 0.5 mL into the shoulder, thigh, or buttocks 1 (One) Time.  -     methylPREDNISolone acetate (DEPO-medrol) injection 80 mg; Inject 1 mL into the shoulder, thigh, or buttocks 1 (One) Time.    Influenza  -     triamcinolone acetonide (KENALOG-40) injection 20 mg; Inject 0.5 mL into the shoulder, thigh, or buttocks 1 (One) Time.  -     methylPREDNISolone acetate (DEPO-medrol) injection 80 mg;  Inject 1 mL into the shoulder, thigh, or buttocks 1 (One) Time.    Viral URI with cough  -     triamcinolone acetonide (KENALOG-40) injection 20 mg; Inject 0.5 mL into the shoulder, thigh, or buttocks 1 (One) Time.  -     methylPREDNISolone acetate (DEPO-medrol) injection 80 mg; Inject 1 mL into the shoulder, thigh, or buttocks 1 (One) Time.    Other orders  -     oseltamivir (TAMIFLU) 75 MG capsule; Take 1 capsule by mouth 2 (Two) Times a Day for 5 days.  -     phenylephrine (SUDAFED PE) 10 MG tablet; 1 po 2-3 times daily prn congestion        No visits with results within 3 Week(s) from this visit.  Latest known visit with results is:    Appointment on 04/05/2017   Component Date Value Ref Range Status   • BSA 04/05/2017 1.7  m^2 Preliminary   • BH CV ECHO ANTONELLA - RVDD 04/05/2017 1.6  cm Preliminary   • IVSd 04/05/2017 1.0  cm Preliminary   • LVIDd 04/05/2017 4.0  cm Preliminary   • LVIDs 04/05/2017 2.6  cm Preliminary   • LVPWd 04/05/2017 1.0  cm Preliminary   • IVS/LVPW 04/05/2017 1.0   Preliminary   • FS 04/05/2017 35.0  % Preliminary   • EDV(Teich) 04/05/2017 70.0  ml Preliminary   • ESV(Teich) 04/05/2017 24.6  ml Preliminary   • EF(Teich) 04/05/2017 64.8  % Preliminary   • EDV(cubed) 04/05/2017 64.0  ml Preliminary   • ESV(cubed) 04/05/2017 17.6  ml Preliminary   • EF(cubed) 04/05/2017 72.5  % Preliminary   • LV mass(C)d 04/05/2017 127.1  grams Preliminary   • LV mass(C)dI 04/05/2017 76.5  grams/m^2 Preliminary   • SV(Teich) 04/05/2017 45.4  ml Preliminary   • SI(Teich) 04/05/2017 27.3  ml/m^2 Preliminary   • SV(cubed) 04/05/2017 46.4  ml Preliminary   • SI(cubed) 04/05/2017 27.9  ml/m^2 Preliminary   • EPSS 04/05/2017 0.5  cm Preliminary   • Ao root diam 04/05/2017 2.8  cm Preliminary   • Ao root area 04/05/2017 6.2  cm^2 Preliminary   • ACS 04/05/2017 1.5  cm Preliminary   • LA dimension 04/05/2017 3.4  cm Preliminary   • LA/Ao 04/05/2017 1.2   Preliminary   • Ao root area (BSA corrected) 04/05/2017 1.7    Preliminary   • MV E max talha 04/05/2017 141.0  cm/sec Preliminary   • MV A max talha 04/05/2017 166.0  cm/sec Preliminary   • MV E/A 04/05/2017 0.85   Preliminary   • Ao pk talha 04/05/2017 157.0  cm/sec Preliminary   • Ao max PG 04/05/2017 9.9  mmHg Preliminary   • Ao max PG (full) 04/05/2017 4.2  mmHg Preliminary   • Ao V2 mean 04/05/2017 104.0  cm/sec Preliminary   • Ao mean PG 04/05/2017 5.0  mmHg Preliminary   • Ao mean PG (full) 04/05/2017 2.0  mmHg Preliminary   • Ao V2 VTI 04/05/2017 32.8  cm Preliminary   • AI max talha 04/05/2017 355.0  cm/sec Preliminary   • AI max PG 04/05/2017 50.5  mmHg Preliminary   • AI dec slope 04/05/2017 209.0  cm/sec^2 Preliminary   • AI P1/2t 04/05/2017 497.5  msec Preliminary   • LV V1 max PG 04/05/2017 5.7  mmHg Preliminary   • LV V1 mean PG 04/05/2017 3.0  mmHg Preliminary   • LV V1 max 04/05/2017 119.0  cm/sec Preliminary   • LV V1 mean 04/05/2017 75.0  cm/sec Preliminary   • LV V1 VTI 04/05/2017 24.1  cm Preliminary   • MR max talha 04/05/2017 486.0  cm/sec Preliminary   • MR max PG 04/05/2017 94.5  mmHg Preliminary   • SV(Ao) 04/05/2017 202.0  ml Preliminary   • SI(Ao) 04/05/2017 121.5  ml/m^2 Preliminary   • PA V2 max 04/05/2017 102.0  cm/sec Preliminary   • PA max PG 04/05/2017 4.2  mmHg Preliminary   • PA V2 mean 04/05/2017 71.7  cm/sec Preliminary   • PA mean PG 04/05/2017 2.0  mmHg Preliminary   • PA V2 VTI 04/05/2017 24.4  cm Preliminary   • TR max talha 04/05/2017 203.0  cm/sec Preliminary   • RVSP(TR) 04/05/2017 26.6  mmHg Preliminary   • RAP systole 04/05/2017 10.0  mmHg Preliminary   • BH CV ECHO ANTONELLA - BZI_BMI 04/05/2017 24.8  kilograms/m^2 Preliminary   •  CV ECHO ANTONELLA - BSA([facility]) 04/05/2017 1.7  m^2 Preliminary   •  CV ECHO ANTONELLA - BZI_METRIC_WEIGHT 04/05/2017 63.5  kg Preliminary   •  CV ECHO ANTONELLA - BZI_METRIC_HEIGHT 04/05/2017 160.0  cm Preliminary   • Echo EF Estimated 04/05/2017 60  % Final   • EF(MOD-sp4) 04/05/2017 65  % Final   ]

## 2018-01-22 RX ORDER — DILTIAZEM HYDROCHLORIDE 120 MG/1
CAPSULE, EXTENDED RELEASE ORAL
Qty: 30 CAPSULE | Refills: 6 | Status: SHIPPED | OUTPATIENT
Start: 2018-01-22 | End: 2018-08-18 | Stop reason: SDUPTHER

## 2018-01-29 ENCOUNTER — OFFICE VISIT (OUTPATIENT)
Dept: FAMILY MEDICINE CLINIC | Facility: CLINIC | Age: 73
End: 2018-01-29

## 2018-01-29 VITALS
DIASTOLIC BLOOD PRESSURE: 80 MMHG | SYSTOLIC BLOOD PRESSURE: 130 MMHG | HEART RATE: 90 BPM | BODY MASS INDEX: 23.42 KG/M2 | HEIGHT: 64 IN | OXYGEN SATURATION: 98 % | WEIGHT: 137.2 LBS | TEMPERATURE: 98.1 F

## 2018-01-29 DIAGNOSIS — J20.9 ACUTE BRONCHITIS, UNSPECIFIED ORGANISM: ICD-10-CM

## 2018-01-29 DIAGNOSIS — J45.21 MILD INTERMITTENT ASTHMA WITH ACUTE EXACERBATION: Primary | ICD-10-CM

## 2018-01-29 DIAGNOSIS — J04.0 LARYNGITIS: ICD-10-CM

## 2018-01-29 DIAGNOSIS — J06.9 ACUTE URI: ICD-10-CM

## 2018-01-29 PROCEDURE — 99214 OFFICE O/P EST MOD 30 MIN: CPT | Performed by: INTERNAL MEDICINE

## 2018-01-29 PROCEDURE — 96372 THER/PROPH/DIAG INJ SC/IM: CPT | Performed by: INTERNAL MEDICINE

## 2018-01-29 RX ORDER — AZITHROMYCIN 250 MG/1
TABLET, FILM COATED ORAL
Qty: 6 TABLET | Refills: 0 | Status: SHIPPED | OUTPATIENT
Start: 2018-01-29 | End: 2018-03-02

## 2018-01-29 RX ORDER — METHYLPREDNISOLONE ACETATE 80 MG/ML
80 INJECTION, SUSPENSION INTRA-ARTICULAR; INTRALESIONAL; INTRAMUSCULAR; SOFT TISSUE ONCE
Status: COMPLETED | OUTPATIENT
Start: 2018-01-29 | End: 2018-01-29

## 2018-01-29 RX ORDER — FLUCONAZOLE 150 MG/1
150 TABLET ORAL DAILY
Qty: 2 TABLET | Refills: 0 | Status: SHIPPED | OUTPATIENT
Start: 2018-01-29 | End: 2018-01-31

## 2018-01-29 RX ORDER — TRIAMCINOLONE ACETONIDE 40 MG/ML
20 INJECTION, SUSPENSION INTRA-ARTICULAR; INTRAMUSCULAR ONCE
Status: COMPLETED | OUTPATIENT
Start: 2018-01-29 | End: 2018-01-29

## 2018-01-29 RX ORDER — PHENYLEPHRINE HCL 10 MG/1
TABLET, FILM COATED ORAL
Qty: 36 TABLET | Refills: 0 | Status: SHIPPED | OUTPATIENT
Start: 2018-01-29 | End: 2018-07-26

## 2018-01-29 RX ADMIN — METHYLPREDNISOLONE ACETATE 80 MG: 80 INJECTION, SUSPENSION INTRA-ARTICULAR; INTRALESIONAL; INTRAMUSCULAR; SOFT TISSUE at 12:11

## 2018-01-29 RX ADMIN — TRIAMCINOLONE ACETONIDE 20 MG: 40 INJECTION, SUSPENSION INTRA-ARTICULAR; INTRAMUSCULAR at 12:10

## 2018-01-29 NOTE — PROGRESS NOTES
Subjective        History of Present Illness     Supriya Alberto is a 72 y.o. female who presents today reporting acute onset of nausea three days prior to developing upper respiratory symptoms.  She continues to feel queasy.  She gagged this morning, but has not been vomiting.  She developed laryngitis over the past day or two.  She reports a sore, scratchy throat, a lot of postnasal drip, sinus pain, and frequent cough productive of yellow sputum with wheezing. She had a fever of 100.4 Saturday morning.  She is reporting some pain in the shoulders, but feels this is due to the frequent cough. She denies flu-like body aches. She was seen approximately three weeks ago on 01/09/18 with flu-like symptoms clinically.  However, influenza screen was negative for Type A and B.  She had a flu vaccine 09/2017.  She had been exposed to family members with symptoms consistent with influenza three weeks ago.  She was treated with Tamiflu, Sudafed PE, and Delsym for the cough syrup.  She was also given a steroid injection on the 9th.  She has been using the Delsym for the cough.  She has used an inhaler in the past, but does not have one on hand currently.       Review of Systems   Constitutional: Positive for fever. Negative for chills and fatigue.   HENT: Positive for congestion, postnasal drip, sinus pain, sore throat and voice change. Negative for ear pain and sinus pressure.    Respiratory: Positive for cough and wheezing. Negative for shortness of breath.    Cardiovascular: Negative for chest pain, palpitations and leg swelling.   Gastrointestinal: Positive for nausea. Negative for abdominal pain, blood in stool, constipation, diarrhea and vomiting.   Endocrine: Negative for cold intolerance, heat intolerance, polydipsia and polyuria.   Genitourinary: Negative for dysuria, frequency, hematuria and urgency.   Skin: Negative for rash.   Neurological: Negative for syncope and weakness.        Objective     Vitals:    01/29/18  "1128   BP: 130/80   Pulse: 90   Temp: 98.1 °F (36.7 °C)   TempSrc: Oral   SpO2: 98%   Weight: 62.2 kg (137 lb 3.2 oz)   Height: 162.6 cm (64\")     Physical Exam   Constitutional: She is oriented to person, place, and time. She appears well-developed and well-nourished. No distress.   HENT:   Head: Normocephalic and atraumatic.   Nose: Right sinus exhibits maxillary sinus tenderness. Left sinus exhibits maxillary sinus tenderness.   Mouth/Throat: Oropharynx is clear and moist. No oropharyngeal exudate.   Bilateral maxillary sinus pain and nasal congestion.  Clear postnasal drip.     Eyes: EOM are normal. Pupils are equal, round, and reactive to light.   Neck: Neck supple. No JVD present. No thyromegaly present.   Cardiovascular: Normal rate, regular rhythm and normal heart sounds.    Pulmonary/Chest: Effort normal and breath sounds normal. No accessory muscle usage. No respiratory distress. She has no wheezes. She has no rales.   Increased bronchial sounds and bilateral wheezes   Abdominal: Soft. Bowel sounds are normal. She exhibits no distension. There is no tenderness.   Musculoskeletal: She exhibits no edema.   Lymphadenopathy:     She has no cervical adenopathy.   Neurological: She is alert and oriented to person, place, and time. No cranial nerve deficit.   Psychiatric: She has a normal mood and affect. Her speech is normal and behavior is normal. Judgment and thought content normal.     Future Appointments  Date Time Provider Department Center   3/22/2018 8:45 AM Feliz Valiente MD [facility] None   4/20/2018 10:30 AM Natalia Aguirre MD [facility] None         Assessment/Plan      She is given a sample of Breo to use one inhalation daily as well as Z-pack as directed and Flonase nasal spray one spray each nostril b.i.d.  A refill on Sudafed PE is sent today. Continue to use Delsym cough syrup as needed per label directions.    After informed verbal consent, patient is given Kenalog 20 mg and Depo-Medrol 80 mg IM " without difficulty or complications.  Patient tolerated well without complications.          Continue routine medications.  She has an appointment scheduled for March 22nd for her routine 6-month follow up with fasting labs one week prior.      Scribed for Dr. Valiente by Samina Field [facility].     Diagnoses and all orders for this visit:    Mild intermittent asthma with acute exacerbation  -     triamcinolone acetonide (KENALOG-40) injection 20 mg; Inject 0.5 mL into the shoulder, thigh, or buttocks 1 (One) Time.  -     methylPREDNISolone acetate (DEPO-medrol) injection 80 mg; Inject 1 mL into the shoulder, thigh, or buttocks 1 (One) Time.    Acute bronchitis, unspecified organism  -     triamcinolone acetonide (KENALOG-40) injection 20 mg; Inject 0.5 mL into the shoulder, thigh, or buttocks 1 (One) Time.  -     methylPREDNISolone acetate (DEPO-medrol) injection 80 mg; Inject 1 mL into the shoulder, thigh, or buttocks 1 (One) Time.    Acute URI  -     triamcinolone acetonide (KENALOG-40) injection 20 mg; Inject 0.5 mL into the shoulder, thigh, or buttocks 1 (One) Time.  -     methylPREDNISolone acetate (DEPO-medrol) injection 80 mg; Inject 1 mL into the shoulder, thigh, or buttocks 1 (One) Time.    Laryngitis    Other orders  -     azithromycin (ZITHROMAX) 250 MG tablet; Take 2 tablets the first day, then 1 tablet daily for 4 days.  -     fluconazole (DIFLUCAN) 150 MG tablet; Take 1 tablet by mouth Daily for 2 doses.  -     phenylephrine (SUDAFED PE) 10 MG tablet; 1 po 2-3 times daily prn congestion      Office Visit on 01/09/2018   Component Date Value Ref Range Status   • Influenza A Ag, EIA 01/09/2018 Negative  Negative Final   • Influenza B Ag, EIA 01/09/2018 Negative  Negative Final   ]

## 2018-03-02 ENCOUNTER — OFFICE VISIT (OUTPATIENT)
Dept: FAMILY MEDICINE CLINIC | Facility: CLINIC | Age: 73
End: 2018-03-02

## 2018-03-02 VITALS
WEIGHT: 137.4 LBS | HEART RATE: 84 BPM | SYSTOLIC BLOOD PRESSURE: 138 MMHG | DIASTOLIC BLOOD PRESSURE: 74 MMHG | BODY MASS INDEX: 23.46 KG/M2 | TEMPERATURE: 98.2 F | HEIGHT: 64 IN

## 2018-03-02 DIAGNOSIS — M62.838 MUSCLE SPASM: Primary | ICD-10-CM

## 2018-03-02 DIAGNOSIS — M79.10 MYALGIA: ICD-10-CM

## 2018-03-02 DIAGNOSIS — E78.2 MIXED HYPERLIPIDEMIA: Chronic | ICD-10-CM

## 2018-03-02 DIAGNOSIS — I83.93 VARICOSE VEINS OF BOTH LOWER EXTREMITIES: Chronic | ICD-10-CM

## 2018-03-02 LAB
ANION GAP SERPL CALCULATED.3IONS-SCNC: 10 MMOL/L (ref 5–15)
BUN BLD-MCNC: 13 MG/DL (ref 8–25)
BUN/CREAT SERPL: 21.7 (ref 7–25)
CALCIUM SPEC-SCNC: 9.5 MG/DL (ref 8.4–10.8)
CHLORIDE SERPL-SCNC: 98 MMOL/L (ref 100–112)
CK SERPL-CCNC: 56 U/L (ref 26–140)
CO2 SERPL-SCNC: 27 MMOL/L (ref 20–32)
CREAT BLD-MCNC: 0.6 MG/DL (ref 0.4–1.3)
GFR SERPL CREATININE-BSD FRML MDRD: 99 ML/MIN/1.73 (ref 39–90)
GLUCOSE BLD-MCNC: 87 MG/DL (ref 70–100)
MAGNESIUM SERPL-MCNC: 2.4 MG/DL (ref 1.8–2.5)
POTASSIUM BLD-SCNC: 4.2 MMOL/L (ref 3.4–5.4)
SODIUM BLD-SCNC: 135 MMOL/L (ref 134–146)

## 2018-03-02 PROCEDURE — 36415 COLL VENOUS BLD VENIPUNCTURE: CPT | Performed by: INTERNAL MEDICINE

## 2018-03-02 PROCEDURE — 99214 OFFICE O/P EST MOD 30 MIN: CPT | Performed by: INTERNAL MEDICINE

## 2018-03-02 PROCEDURE — 83735 ASSAY OF MAGNESIUM: CPT | Performed by: INTERNAL MEDICINE

## 2018-03-02 PROCEDURE — 80048 BASIC METABOLIC PNL TOTAL CA: CPT | Performed by: INTERNAL MEDICINE

## 2018-03-02 PROCEDURE — 82550 ASSAY OF CK (CPK): CPT | Performed by: INTERNAL MEDICINE

## 2018-03-02 NOTE — PROGRESS NOTES
"Subjective        History of Present Illness     Supriya Alberto is a 72 y.o. female who presents reporting an onset of \"burning and cramping\" involving the feet and hands for the past month.  The pain is normally occurring 1-2 times a day and occurs more often at night.  She sometimes has to get up and walk around at night to relieve the pain.   She is on lovastatin 10 mg three days weekly.  She had some tolerability issues with daily lovastatin in the past.   She continues on calcium supplement once daily.       Review of Systems   Constitutional: Negative for chills, fatigue and fever.   HENT: Negative for congestion, ear pain, postnasal drip, sinus pressure and sore throat.    Respiratory: Negative for cough, shortness of breath and wheezing.    Cardiovascular: Negative for chest pain, palpitations and leg swelling.   Gastrointestinal: Negative for abdominal pain, blood in stool, constipation, diarrhea, nausea and vomiting.   Endocrine: Negative for cold intolerance, heat intolerance, polydipsia and polyuria.   Genitourinary: Negative for dysuria, frequency, hematuria and urgency.   Musculoskeletal: Positive for myalgias.   Skin: Negative for rash.   Neurological: Negative for syncope and weakness.        Objective     Vitals:    03/02/18 0930   BP: 138/74   Pulse: 84   Temp: 98.2 °F (36.8 °C)   TempSrc: Oral   Weight: 62.3 kg (137 lb 6.4 oz)   Height: 162.6 cm (64\")     Physical Exam   Constitutional: She is oriented to person, place, and time. She appears well-developed and well-nourished. No distress.   HENT:   Head: Normocephalic and atraumatic.   Nose: Nose normal.   Mouth/Throat: Oropharynx is clear and moist. No oropharyngeal exudate.   Eyes: EOM are normal. Pupils are equal, round, and reactive to light.   Neck: Neck supple. No JVD present. No thyromegaly present.   Cardiovascular: Normal rate, regular rhythm and normal heart sounds.    Pulmonary/Chest: Effort normal and breath sounds normal. No accessory " muscle usage. No respiratory distress. She has no wheezes. She has no rales.   Abdominal: Soft. Bowel sounds are normal. She exhibits no distension. There is no tenderness.   Musculoskeletal: She exhibits no edema.   No muscle tenderness.  No synovitis.       Vascular Status -  Her exam exhibits right foot vasculature abnormal (Multiple varicosities noted. ). Her exam exhibits left foot vasculature abnormal.  Lymphadenopathy:     She has no cervical adenopathy.   Neurological: She is alert and oriented to person, place, and time. No cranial nerve deficit.   Psychiatric: She has a normal mood and affect. Her speech is normal and behavior is normal. Judgment and thought content normal.       Future Appointments  Date Time Provider Department Center   3/22/2018 8:45 AM Feliz Valiente MD [facility] None   3/27/2018 10:30 AM [facility] MAMM 1 [facility]LLIE [facility] [place]   3/27/2018 11:00 AM [facility] DEXA 1 [facility] DEXA [facility] [place]   4/20/2018 10:30 AM Natalia Aguirre MD [facility] None       Assessment/Plan      This is a new problem.    She will stop by the lab for today for BMP, magnesium and CK to evaluate for electrolyte abnormality, etc.  Recommended she take a break from her lovastatin for the next two weeks until she has her annual labs.  If she notices improvement, she can restart the lovastatin the following week and see if the pain recurs.  We will discuss this further when she returns in three weeks for annual follow up.      Continue current medications.  Continue the weight loss efforts.  Pursue regular exercise.    Continue other prescription medications and return in three weeks for routine annual follow up with fasting labs one week prior.     Scribed for Dr. Valiente by Samina Field [facility].        Diagnoses and all orders for this visit:    Muscle spasm  -     Basic Metabolic Panel  -     Magnesium  -     CK    Myalgia  -     Basic Metabolic Panel  -     Magnesium  -     CK    Mixed hyperlipidemia    Varicose veins  of both lower extremities        No visits with results within 3 Week(s) from this visit.  Latest known visit with results is:    Office Visit on 01/09/2018   Component Date Value Ref Range Status   • Influenza A Ag, EIA 01/09/2018 Negative  Negative Final   • Influenza B Ag, EIA 01/09/2018 Negative  Negative Final   ]

## 2018-03-15 ENCOUNTER — LAB (OUTPATIENT)
Dept: LAB | Facility: OTHER | Age: 73
End: 2018-03-15

## 2018-03-15 DIAGNOSIS — K21.9 GASTROESOPHAGEAL REFLUX DISEASE, ESOPHAGITIS PRESENCE NOT SPECIFIED: ICD-10-CM

## 2018-03-15 DIAGNOSIS — I10 ESSENTIAL HYPERTENSION: ICD-10-CM

## 2018-03-15 DIAGNOSIS — M17.12 OSTEOARTHRITIS OF LEFT KNEE, UNSPECIFIED OSTEOARTHRITIS TYPE: ICD-10-CM

## 2018-03-15 DIAGNOSIS — E78.5 HYPERLIPIDEMIA, UNSPECIFIED HYPERLIPIDEMIA TYPE: ICD-10-CM

## 2018-03-15 DIAGNOSIS — F41.1 GENERALIZED ANXIETY DISORDER: ICD-10-CM

## 2018-03-15 DIAGNOSIS — M85.80 OSTEOPENIA: ICD-10-CM

## 2018-03-15 LAB
25(OH)D3 SERPL-MCNC: 34.7 NG/ML (ref 30–100)
ALBUMIN SERPL-MCNC: 3.8 G/DL (ref 3.2–5.5)
ALBUMIN/GLOB SERPL: 0.9 G/DL (ref 1–3)
ALP SERPL-CCNC: 114 U/L (ref 15–121)
ALT SERPL W P-5'-P-CCNC: 19 U/L (ref 10–60)
ANION GAP SERPL CALCULATED.3IONS-SCNC: 9 MMOL/L (ref 5–15)
AST SERPL-CCNC: 20 U/L (ref 10–60)
BASOPHILS # BLD AUTO: 0.03 10*3/MM3 (ref 0–0.2)
BASOPHILS NFR BLD AUTO: 0.3 % (ref 0–2)
BILIRUB SERPL-MCNC: 0.6 MG/DL (ref 0.2–1)
BUN BLD-MCNC: 10 MG/DL (ref 8–25)
BUN/CREAT SERPL: 14.3 (ref 7–25)
CALCIUM SPEC-SCNC: 9.5 MG/DL (ref 8.4–10.8)
CHLORIDE SERPL-SCNC: 100 MMOL/L (ref 100–112)
CHOLEST SERPL-MCNC: 268 MG/DL (ref 150–200)
CO2 SERPL-SCNC: 28 MMOL/L (ref 20–32)
CREAT BLD-MCNC: 0.7 MG/DL (ref 0.4–1.3)
DEPRECATED RDW RBC AUTO: 47.7 FL (ref 36.4–46.3)
EOSINOPHIL # BLD AUTO: 0.1 10*3/MM3 (ref 0–0.7)
EOSINOPHIL NFR BLD AUTO: 1.1 % (ref 0–7)
ERYTHROCYTE [DISTWIDTH] IN BLOOD BY AUTOMATED COUNT: 14.1 % (ref 11.5–14.5)
GFR SERPL CREATININE-BSD FRML MDRD: 83 ML/MIN/1.73 (ref 39–90)
GLOBULIN UR ELPH-MCNC: 4.1 GM/DL (ref 2.5–4.6)
GLUCOSE BLD-MCNC: 104 MG/DL (ref 70–100)
HCT VFR BLD AUTO: 40.8 % (ref 35–45)
HDLC SERPL-MCNC: 114 MG/DL (ref 35–100)
HGB BLD-MCNC: 13.7 G/DL (ref 12–15.5)
LDLC SERPL CALC-MCNC: 133 MG/DL
LDLC/HDLC SERPL: 1.17 {RATIO}
LYMPHOCYTES # BLD AUTO: 1.58 10*3/MM3 (ref 0.6–4.2)
LYMPHOCYTES NFR BLD AUTO: 16.8 % (ref 10–50)
MCH RBC QN AUTO: 32.2 PG (ref 26.5–34)
MCHC RBC AUTO-ENTMCNC: 33.6 G/DL (ref 31.4–36)
MCV RBC AUTO: 95.8 FL (ref 80–98)
MONOCYTES # BLD AUTO: 1.18 10*3/MM3 (ref 0–0.9)
MONOCYTES NFR BLD AUTO: 12.5 % (ref 0–12)
NEUTROPHILS # BLD AUTO: 6.52 10*3/MM3 (ref 2–8.6)
NEUTROPHILS NFR BLD AUTO: 69.3 % (ref 37–80)
PLATELET # BLD AUTO: 344 10*3/MM3 (ref 150–450)
PMV BLD AUTO: 8.9 FL (ref 8–12)
POTASSIUM BLD-SCNC: 4 MMOL/L (ref 3.4–5.4)
PROT SERPL-MCNC: 7.9 G/DL (ref 6.7–8.2)
RBC # BLD AUTO: 4.26 10*6/MM3 (ref 3.77–5.16)
SODIUM BLD-SCNC: 137 MMOL/L (ref 134–146)
TRIGL SERPL-MCNC: 103 MG/DL (ref 35–160)
TSH SERPL DL<=0.05 MIU/L-ACNC: 3.42 MIU/ML (ref 0.46–4.68)
VLDLC SERPL-MCNC: 20.6 MG/DL
WBC NRBC COR # BLD: 9.41 10*3/MM3 (ref 3.2–9.8)

## 2018-03-15 PROCEDURE — 36415 COLL VENOUS BLD VENIPUNCTURE: CPT | Performed by: INTERNAL MEDICINE

## 2018-03-15 PROCEDURE — 84443 ASSAY THYROID STIM HORMONE: CPT | Performed by: INTERNAL MEDICINE

## 2018-03-15 PROCEDURE — 80053 COMPREHEN METABOLIC PANEL: CPT | Performed by: INTERNAL MEDICINE

## 2018-03-15 PROCEDURE — 82306 VITAMIN D 25 HYDROXY: CPT | Performed by: INTERNAL MEDICINE

## 2018-03-15 PROCEDURE — 85025 COMPLETE CBC W/AUTO DIFF WBC: CPT | Performed by: INTERNAL MEDICINE

## 2018-03-15 PROCEDURE — 80061 LIPID PANEL: CPT | Performed by: INTERNAL MEDICINE

## 2018-03-21 DIAGNOSIS — Z12.31 ENCOUNTER FOR SCREENING MAMMOGRAM FOR MALIGNANT NEOPLASM OF BREAST: Primary | ICD-10-CM

## 2018-03-21 DIAGNOSIS — M85.88 OSTEOPENIA OF OTHER SITE: ICD-10-CM

## 2018-03-22 ENCOUNTER — OFFICE VISIT (OUTPATIENT)
Dept: FAMILY MEDICINE CLINIC | Facility: CLINIC | Age: 73
End: 2018-03-22

## 2018-03-22 VITALS
SYSTOLIC BLOOD PRESSURE: 160 MMHG | DIASTOLIC BLOOD PRESSURE: 70 MMHG | HEART RATE: 73 BPM | OXYGEN SATURATION: 99 % | WEIGHT: 138.4 LBS | HEIGHT: 64 IN | BODY MASS INDEX: 23.63 KG/M2 | TEMPERATURE: 98.1 F

## 2018-03-22 DIAGNOSIS — I34.0 NON-RHEUMATIC MITRAL REGURGITATION: Chronic | ICD-10-CM

## 2018-03-22 DIAGNOSIS — J01.00 ACUTE NON-RECURRENT MAXILLARY SINUSITIS: ICD-10-CM

## 2018-03-22 DIAGNOSIS — M85.89 OSTEOPENIA OF MULTIPLE SITES: Chronic | ICD-10-CM

## 2018-03-22 DIAGNOSIS — I47.1 SVT (SUPRAVENTRICULAR TACHYCARDIA) (HCC): Chronic | ICD-10-CM

## 2018-03-22 DIAGNOSIS — E78.2 MIXED HYPERLIPIDEMIA: Primary | Chronic | ICD-10-CM

## 2018-03-22 DIAGNOSIS — R53.81 MALAISE AND FATIGUE: ICD-10-CM

## 2018-03-22 DIAGNOSIS — R53.83 MALAISE AND FATIGUE: ICD-10-CM

## 2018-03-22 PROCEDURE — 99214 OFFICE O/P EST MOD 30 MIN: CPT | Performed by: INTERNAL MEDICINE

## 2018-03-22 RX ORDER — FLUTICASONE PROPIONATE 50 MCG
2 SPRAY, SUSPENSION (ML) NASAL DAILY
Qty: 18.2 ML | Refills: 11 | Status: SHIPPED | OUTPATIENT
Start: 2018-03-22 | End: 2018-07-26

## 2018-03-22 NOTE — PROGRESS NOTES
Subjective     Supriya Alberto is a 72 y.o. female.     History of Present Illness     Supriya is here for annual followup of high cholesterol, PSVT, GERD/gastritis, osteopenia, generalized anxiety disorder, seasonal allergies, mild intermittent asthma and dysthymia, among other issues.  Repeat DEXA is due 3/2018.  She continues on calcium/vitamin D supplement.    PSVT is well controlled.  No further syncopal episodes.  Dr. Aguirre performed successful pathway ablation 2/2015.  She continues on metoprolol.  Dr. Aguirre continues to follow her annually.  Dr. Hines performed extensive ischemic workup prior to the pathway ablation.  Left heart catheter was unremarkable.  She is on diltiazem.    Her blood pressure is above goal today.  Heart rate is normal.  Her weight is down 5 pounds in the past year.    She has been here recently to address some URI symptoms and is reporting increased spring allergy symptoms now.  Those symptoms include rhinitis and postnasal drip and sneezing.  She is out of her nasal steroids.  She has already restarted Claritin 10 mg daily and uses phenylephrine occasionally as needed for congestion.    She desires mammograms to be a two-year intervals.  She is due this spring and we have ordered that.    When she was here 3 weeks ago, she was describing muscle cramps mostly in the hands and feet.  She was using a Breo inhaler, and I suspect that was part of the problem.  She was also taking lovastatin 3 days weekly.  We had her take a break on the lovastatin until she returned.  Although her cholesterol is higher, her ratio is still very good due to high LDL cholesterol.  She thinks she may feel a little better without the lovastatin, but not a lot of difference.  A review of her diet reveals a considerable amount of fried food, dairy clean food, and daily ice cream.  We discussed options and have decided to recommend greater efforts at diet and exercise.  We are going to try a year without cholesterol  "lowering medication to see how she does.    She feels that the Paxil is adequately controlling anxiety or dysthymic symptoms.    Her labs are all reviewed with results listed below.  CMP and CBC are unremarkable.  Fasting glucose is higher at 104.  LDL is 133.  HDL is 114, for an excellent ratio.  Total cholesterol is higher at 268.  Thyroid screen is normal.        Review of Systems   Constitutional: Negative for chills, fatigue and fever.   HENT: Negative for congestion, ear pain, postnasal drip, sinus pressure and sore throat.    Respiratory: Negative for cough, shortness of breath and wheezing.    Cardiovascular: Negative for chest pain, palpitations and leg swelling.   Gastrointestinal: Negative for abdominal pain, blood in stool, constipation, diarrhea, nausea and vomiting.   Endocrine: Negative for cold intolerance, heat intolerance, polydipsia and polyuria.   Genitourinary: Negative for dysuria, frequency, hematuria and urgency.   Skin: Negative for rash.   Neurological: Negative for syncope and weakness.       Objective     /70   Pulse 73   Temp 98.1 °F (36.7 °C) (Oral)   Ht 162.6 cm (64\")   Wt 62.8 kg (138 lb 6.4 oz)   SpO2 99%   BMI 23.76 kg/m²     Physical Exam   Constitutional: She is oriented to person, place, and time. She appears well-developed and well-nourished. No distress.   HENT:   Head: Normocephalic and atraumatic.   Nose: Right sinus exhibits no maxillary sinus tenderness and no frontal sinus tenderness. Left sinus exhibits no maxillary sinus tenderness and no frontal sinus tenderness.   Mouth/Throat: Uvula is midline, oropharynx is clear and moist and mucous membranes are normal. No oral lesions. No tonsillar exudate.   Mild nasal congestion.  Clear postnasal drip noted.   Eyes: Conjunctivae and EOM are normal. Pupils are equal, round, and reactive to light.   Neck: Trachea normal. Neck supple. No JVD present. Carotid bruit is not present. No tracheal deviation present. No " thyroid mass and no thyromegaly present.   Cardiovascular: Normal rate, regular rhythm and normal heart sounds.   No extrasystoles are present. PMI is not displaced.    No murmur heard.  Pulmonary/Chest: Effort normal and breath sounds normal. No accessory muscle usage. No respiratory distress. She has no decreased breath sounds. She has no wheezes. She has no rhonchi. She has no rales.   Mildly diminished excursion on expiratory phase of cough bilaterally.  No wheezes today.   Abdominal: Soft. Bowel sounds are normal. She exhibits no distension. There is no hepatosplenomegaly. There is no tenderness.   Mildly overweight abdomen.     Vascular Status -  Her right foot exhibits abnormal right foot vasculature . Her right foot exhibits normal right foot edema. Her left foot exhibits abnormal left foot vasculature . Her left foot exhibits normal left foot edema.  Lymphadenopathy:     She has no cervical adenopathy.   Neurological: She is alert and oriented to person, place, and time. No cranial nerve deficit. Coordination normal.   Skin: Skin is warm, dry and intact. No rash noted. No cyanosis. Nails show no clubbing.   Psychiatric: She has a normal mood and affect. Her speech is normal and behavior is normal. Thought content normal.   Vitals reviewed.      PHQ-2/PHQ-9 Depression Screening 3/22/2018   Little interest or pleasure in doing things 0   Feeling down, depressed, or hopeless 0   Trouble falling or staying asleep, or sleeping too much -   Feeling tired or having little energy -   Poor appetite or overeating -   Feeling bad about yourself - or that you are a failure or have let yourself or your family down -   Trouble concentrating on things, such as reading the newspaper or watching television -   Moving or speaking so slowly that other people could have noticed. Or the opposite - being so fidgety or restless that you have been moving around a lot more than usual -   Thoughts that you would be better off dead,  or of hurting yourself in some way -   Total Score 0   If you checked off any problems, how difficult have these problems made it for you to do your work, take care of things at home, or get along with other people? -       Assessment/Plan     We are going to take a 1 year break on statin therapy.  She was just taking the lovastatin 3 days weekly.  Intensify dietary efforts.  Increase walking for exercise.  She describes quite a bit of room for improvement in the diet.    Continue the extended release diltiazem for her PSVT.  No beta blockers in this patient with mild intermittent asthma.    Continue Claritin 10 mg daily.  Resume Flonase nasal spray twice daily.  She can tolerate occasional use of phenylephrine without aggravating her PSVT.    Use albuterol rescue inhaler as needed.  Notify me if asthma is not at goal.  She tolerated Breo relatively well, but I believe it is likely precipitated or aggravated her muscle cramps in the hands and feet.    Continue the Paxil daily.    Her mammogram and DEXA have been scheduled.  Continue the calcium and vitamin D supplement.  Pursue weightbearing exercises.    Return to clinic in one year with fasting labs prior.      Diagnoses and all orders for this visit:    Mixed hyperlipidemia  -     CBC Auto Differential; Future  -     Comprehensive Metabolic Panel; Future  -     Lipid Panel; Future    Acute non-recurrent maxillary sinusitis  -     fluticasone (FLONASE) 50 MCG/ACT nasal spray; 2 sprays into each nostril Daily.    SVT (supraventricular tachycardia)    Non-rheumatic mitral regurgitation    Malaise and fatigue    Osteopenia of multiple sites  -     Vitamin D 25 Hydroxy; Future        Lab on 03/15/2018   Component Date Value Ref Range Status   • WBC 03/15/2018 9.41  3.20 - 9.80 10*3/mm3 Final   • RBC 03/15/2018 4.26  3.77 - 5.16 10*6/mm3 Final   • Hemoglobin 03/15/2018 13.7  12.0 - 15.5 g/dL Final   • Hematocrit 03/15/2018 40.8  35.0 - 45.0 % Final   • MCV 03/15/2018  95.8  80.0 - 98.0 fL Final   • MCH 03/15/2018 32.2  26.5 - 34.0 pg Final   • MCHC 03/15/2018 33.6  31.4 - 36.0 g/dL Final   • RDW 03/15/2018 14.1  11.5 - 14.5 % Final   • RDW-SD 03/15/2018 47.7* 36.4 - 46.3 fl Final   • MPV 03/15/2018 8.9  8.0 - 12.0 fL Final   • Platelets 03/15/2018 344  150 - 450 10*3/mm3 Final   • Neutrophil % 03/15/2018 69.3  37.0 - 80.0 % Final   • Lymphocyte % 03/15/2018 16.8  10.0 - 50.0 % Final   • Monocyte % 03/15/2018 12.5* 0.0 - 12.0 % Final   • Eosinophil % 03/15/2018 1.1  0.0 - 7.0 % Final   • Basophil % 03/15/2018 0.3  0.0 - 2.0 % Final   • Neutrophils, Absolute 03/15/2018 6.52  2.00 - 8.60 10*3/mm3 Final   • Lymphocytes, Absolute 03/15/2018 1.58  0.60 - 4.20 10*3/mm3 Final   • Monocytes, Absolute 03/15/2018 1.18* 0.00 - 0.90 10*3/mm3 Final   • Eosinophils, Absolute 03/15/2018 0.10  0.00 - 0.70 10*3/mm3 Final   • Basophils, Absolute 03/15/2018 0.03  0.00 - 0.20 10*3/mm3 Final   • Glucose 03/15/2018 104* 70 - 100 mg/dL Final   • BUN 03/15/2018 10  8 - 25 mg/dL Final   • Creatinine 03/15/2018 0.70  0.40 - 1.30 mg/dL Final   • Sodium 03/15/2018 137  134 - 146 mmol/L Final   • Potassium 03/15/2018 4.0  3.4 - 5.4 mmol/L Final   • Chloride 03/15/2018 100  100 - 112 mmol/L Final   • CO2 03/15/2018 28.0  20.0 - 32.0 mmol/L Final   • Calcium 03/15/2018 9.5  8.4 - 10.8 mg/dL Final   • Total Protein 03/15/2018 7.9  6.7 - 8.2 g/dL Final   • Albumin 03/15/2018 3.80  3.20 - 5.50 g/dL Final   • ALT (SGPT) 03/15/2018 19  10 - 60 U/L Final   • AST (SGOT) 03/15/2018 20  10 - 60 U/L Final   • Alkaline Phosphatase 03/15/2018 114  15 - 121 U/L Final   • Total Bilirubin 03/15/2018 0.6  0.2 - 1.0 mg/dL Final   • eGFR Non African Amer 03/15/2018 83  39 - 90 mL/min/1.73 Final   • Globulin 03/15/2018 4.1  2.5 - 4.6 gm/dL Final   • A/G Ratio 03/15/2018 0.9* 1.0 - 3.0 g/dL Final   • BUN/Creatinine Ratio 03/15/2018 14.3  7.0 - 25.0 Final   • Anion Gap 03/15/2018 9.0  5.0 - 15.0 mmol/L Final   • Total Cholesterol  03/15/2018 268* 150 - 200 mg/dL Final   • Triglycerides 03/15/2018 103  35 - 160 mg/dL Final   • HDL Cholesterol 03/15/2018 114* 35 - 100 mg/dL Final   • LDL Cholesterol  03/15/2018 133  mg/dL Final   • VLDL Cholesterol 03/15/2018 20.6  mg/dL Final   • LDL/HDL Ratio 03/15/2018 1.17   Final   • 25 Hydroxy, Vitamin D 03/15/2018 34.7  30.0 - 100.0 ng/ml Final   • TSH 03/15/2018 3.420  0.460 - 4.680 mIU/mL Final   Office Visit on 03/02/2018   Component Date Value Ref Range Status   • Glucose 03/02/2018 87  70 - 100 mg/dL Final   • BUN 03/02/2018 13  8 - 25 mg/dL Final   • Creatinine 03/02/2018 0.60  0.40 - 1.30 mg/dL Final   • Sodium 03/02/2018 135  134 - 146 mmol/L Final   • Potassium 03/02/2018 4.2  3.4 - 5.4 mmol/L Final   • Chloride 03/02/2018 98* 100 - 112 mmol/L Final   • CO2 03/02/2018 27.0  20.0 - 32.0 mmol/L Final   • Calcium 03/02/2018 9.5  8.4 - 10.8 mg/dL Final   • eGFR Non African Amer 03/02/2018 99* 39 - 90 mL/min/1.73 Final   • BUN/Creatinine Ratio 03/02/2018 21.7  7.0 - 25.0 Final   • Anion Gap 03/02/2018 10.0  5.0 - 15.0 mmol/L Final   • Magnesium 03/02/2018 2.4  1.8 - 2.5 mg/dL Final   • Creatine Kinase 03/02/2018 56  26 - 140 U/L Final   ]

## 2018-04-20 ENCOUNTER — OFFICE VISIT (OUTPATIENT)
Dept: CARDIOLOGY | Facility: CLINIC | Age: 73
End: 2018-04-20

## 2018-04-20 VITALS
HEART RATE: 70 BPM | HEIGHT: 64 IN | DIASTOLIC BLOOD PRESSURE: 68 MMHG | WEIGHT: 138 LBS | BODY MASS INDEX: 23.56 KG/M2 | SYSTOLIC BLOOD PRESSURE: 142 MMHG

## 2018-04-20 DIAGNOSIS — R00.2 PALPITATIONS: ICD-10-CM

## 2018-04-20 DIAGNOSIS — I51.9 MILD DIASTOLIC DYSFUNCTION: ICD-10-CM

## 2018-04-20 DIAGNOSIS — I47.1 SVT (SUPRAVENTRICULAR TACHYCARDIA) (HCC): Primary | Chronic | ICD-10-CM

## 2018-04-20 DIAGNOSIS — I35.1 NONRHEUMATIC AORTIC VALVE INSUFFICIENCY: ICD-10-CM

## 2018-04-20 DIAGNOSIS — I34.0 NON-RHEUMATIC MITRAL REGURGITATION: Chronic | ICD-10-CM

## 2018-04-20 DIAGNOSIS — R55 VASOVAGAL SYNCOPE: ICD-10-CM

## 2018-04-20 PROCEDURE — 99213 OFFICE O/P EST LOW 20 MIN: CPT | Performed by: INTERNAL MEDICINE

## 2018-04-20 RX ORDER — OMEPRAZOLE 40 MG/1
40 CAPSULE, DELAYED RELEASE ORAL DAILY
COMMUNITY
Start: 2018-04-01 | End: 2018-07-26

## 2018-04-20 NOTE — PROGRESS NOTES
Supriya Alberto  72 y.o. female    04/20/2018  1. SVT (supraventricular tachycardia) - S/P ablation 2015    2. Non-rheumatic mitral regurgitation    3. Vasovagal syncope    4. Palpitations    5. Nonrheumatic aortic valve insufficiency    6. Mild diastolic dysfunction        History of Present Illness    72 years old patient  underwent cardiac catheterization noted to have normal coronaries with normal left systolic function.  Patient has symptomatic supraventricular tachycardia underwent EP study and successful slow pathway ablation.  No further tachycardia was reported.  Normal left systolic function with a mild mitral aortic and tricuspid regurgitation.  There is a moderate mitral regurgitation.  Patient denies orthopnea, PND, nauseous, vomiting, diarrhea.  Denies intermittent claudication.  Denies any palpitation or fluttering.  Denies any syncope or near syncopal episode.    3/2018  Total Cholesterol 150 - 200 mg/dL 268     Triglycerides 35 - 160 mg/dL 103    HDL Cholesterol 35 - 100 mg/dL 114     LDL Cholesterol  mg/dL 133    VLDL Cholesterol mg/dL 20.6    LDL/HDL Ratio  1.17       ECHO 4/17/2017  · Left ventricular function is normal. Estimated EF = 60%.  · Left ventricular diastolic dysfunction (grade I) consistent with impaired relaxation.  · Mild aortic valve regurgitation is present.  · Moderate mitral valve regurgitation is present  · Mild tricuspid valve regurgitation is present    SUBJECTIVE    Allergies   Allergen Reactions   • Crestor [Rosuvastatin Calcium] Myalgia   • Adhesive Tape Rash   • Latex Rash         Past Medical History:   Diagnosis Date   • Allergic rhinitis    • Angina pectoris    • Chronic seasonal allergic rhinitis due to pollen    • Constipation    • Depressive disorder    • Diverticulitis of sigmoid colon    • Dizziness    • Eustachian tube disorder    • Jw hematuria    • Generalized anxiety disorder    • GERD (gastroesophageal reflux disease)    • Hyperlipidemia    • Malaise and  fatigue    • Menopause    • Mild diastolic dysfunction    • Mitral valve regurgitation-moderate    • Osteopenia    • Palpitations    • Paroxysmal supraventricular tachycardia    • Prolapse of vaginal vault after hysterectomy    • Rosacea    • SVT (supraventricular tachycardia) - S/P ablation 2015    • Syncope          Past Surgical History:   Procedure Laterality Date   • CARDIAC ABLATION  02/2015    SVT pathway ablation, Dr. Aguirre   • CARDIAC CATHETERIZATION  11/12/2014    Normal coronaries. Normal LV systolic function   • COLPOPEXY VAGINAL  01/28/2013    laparoscopic uterisacral colpopexy (intraperitoneal) Lysis of adhesions (took 1.5 hrs of 2.5 hrs spent laparoscopically ) Tension free vaginal tape midurethral sling Posterior colporrhaphy. Perineorrhaphy. Cystourethroscopy   • ENDOSCOPY AND COLONOSCOPY  04/2014    No polyps. Minimal diverticulosis (sigmoid). Dr Louise   • HEMORRHOIDECTOMY  2005   • HYSTERECTOMY     • OOPHORECTOMY           Family History   Problem Relation Age of Onset   • Lung cancer Father    • Heart disease Other          Social History     Social History   • Marital status:      Spouse name: N/A   • Number of children: N/A   • Years of education: N/A     Occupational History   • Not on file.     Social History Main Topics   • Smoking status: Never Smoker   • Smokeless tobacco: Never Used   • Alcohol use No   • Drug use: No   • Sexual activity: Defer     Other Topics Concern   • Not on file     Social History Narrative   • No narrative on file         Current Outpatient Prescriptions   Medication Sig Dispense Refill   • Calcium Carbonate-Vit D-Min (CALTRATE 600+D PLUS PO) Take 1 tablet by mouth 2 (Two) Times a Day.     • CARTIA  MG 24 hr capsule TAKE ONE CAPSULE BY MOUTH ONCE DAILY 30 capsule 6   • fluticasone (FLONASE) 50 MCG/ACT nasal spray 2 sprays into each nostril Daily. 18.2 mL 11   • loratadine (CLARITIN) 10 MG tablet Take 10 mg by mouth Every Night.     • lovastatin  "(MEVACOR) 20 MG tablet TAKE ONE TABLET BY MOUTH ONCE DAILY AT BEDTIME 90 tablet 1   • omeprazole (priLOSEC) 40 MG capsule Take 40 capsules by mouth Daily.     • PARoxetine (PAXIL) 20 MG tablet TAKE ONE TABLET BY MOUTH ONCE DAILY AT BEDTIME 30 tablet 11   • phenylephrine (SUDAFED PE) 10 MG tablet 1 po 2-3 times daily prn congestion 36 tablet 0     No current facility-administered medications for this visit.          OBJECTIVE    /68   Pulse 70   Ht 162.6 cm (64\")   Wt 62.6 kg (138 lb)   BMI 23.69 kg/m²         Review of Systems     Constitutional:  Denies recent weight loss, weight gain, fever or chills, no change in exercise tolerance     HENT:  Denies any hearing loss, epistaxis, hoarseness, or difficulty speaking.     Eyes: Wears eyeglasses or contact lenses     Respiratory:  Denies dyspnea with exertion,no cough, wheezing, or hemoptysis.     Cardiovascular: Negative for palpations, chest pain, orthopnea, PND, peripheral edema, syncope, or claudication.     Gastrointestinal:  Denies change in bowel habits, dyspepsia, ulcer disease, hematochezia, or melena.     Endocrine: Negative for cold intolerance, heat intolerance, polydipsia, polyphagia and polyuria. Denies any history of weight change, heat/cold intolerance, polydipsia, polyuria     Genitourinary: Negative.      Musculoskeletal: Denies any history of arthritic symptoms or back problems     Skin:  Denies any change in hair or nails, rashes, or skin lesions.     Allergic/Immunologic: Negative.  Negative for environmental allergies, food allergies and immunocompromised state.     Neurological:  Denies any history of recurrent headaches, strokes, TIA, or seizure disorder.     Hematological: Denies any food allergies, seasonal allergies, bleeding disorders, or lymphadenopathy.     Psychiatric/Behavioral: Denies any history of depression, substance abuse, or change in cognitive function.         Physical Exam     Constitutional: Cooperative, alert and " oriented, well-developed, well-nourished, in no acute distress.     HENT:   Head: Normocephalic, normal hair patterns, no masses or tenderness.  Ears, Nose, and Throat: No gross abnormalities. No pallor or cyanosis. Dentition good.   Eyes: EOMS intact, PERRL, conjunctivae and lids unremarkable. Fundoscopic exam and visual fields not performed.   Neck: No palpable masses or adenopathy, no thyromegaly, no JVD, carotid pulses are full and equal bilaterally and without  Bruits.     Cardiovascular: Regular rhythm, S1 and S2 normal, no S3 or S4. Apical impulse not displaced. No  gallops, or rubs detected.  positive systolic murmur at the mitral valve area    Pulmonary/Chest: Chest: normal symmetry, no tenderness to palpation, normal respiratory excursion, no intercostal retraction, no use of accessory muscles.            Pulmonary: Normal breath sounds. No rales or ronchi.    Abdominal: Abdomen soft, bowel sounds normoactive, no masses, no hepatosplenomegaly, non-tender, no bruits.     Musculoskeletal: No deformities, clubbing, cyanosis, erythema, or edema observed. There are no spinal abnormalities noted. Normal muscle strength and tone. Pulses full and equal in all extremities, no bruits auscultated.     Neurological: No gross motor or sensory deficits noted, affect appropriate, oriented to time, person, place.     Skin: Warm and dry to the touch, no apparent skin lesions or masses noted.     Psychiatric: She has a normal mood and affect. Her behavior is normal. Judgment and thought content normal.         Procedures      Lab Results   Component Value Date    WBC 9.41 03/15/2018    HGB 13.7 03/15/2018    HCT 40.8 03/15/2018    MCV 95.8 03/15/2018     03/15/2018     Lab Results   Component Value Date    GLUCOSE 104 (H) 03/15/2018    BUN 10 03/15/2018    CREATININE 0.70 03/15/2018    EGFRIFNONA 83 03/15/2018    BCR 14.3 03/15/2018    CO2 28.0 03/15/2018    CALCIUM 9.5 03/15/2018    ALBUMIN 3.80 03/15/2018    LABIL2  0.9 (L) 03/15/2018    AST 20 03/15/2018    ALT 19 03/15/2018     Lab Results   Component Value Date    CHOL 268 (H) 03/15/2018    CHOL 235 (H) 03/15/2017     Lab Results   Component Value Date    TRIG 103 03/15/2018    TRIG 118 03/15/2017    TRIG 132 03/14/2016     Lab Results   Component Value Date     (H) 03/15/2018    HDL 95 03/15/2017    HDL 75.1 03/14/2016     No components found for: LDLCALC  Lab Results   Component Value Date     03/15/2018     03/15/2017     03/14/2016     No results found for: HDLLDLRATIO  No components found for: CHOLHDL  No results found for: HGBA1C  Lab Results   Component Value Date    TSH 3.420 03/15/2018           ASSESSMENT AND PLAN  #1 supraventricular tachycardia status post EP study and ablation.  #2 moderate mitral, mild aortic and mild tricuspid regurgitations.  #3 history of chest pain atypical with a normal cardia catheterizations     Clinically there is no sign of any acute cardiac decompensation based on the clinical history physical finding.  Evidence of active ischemia.  We'll see her back in one year R depends on patient clinical conditions.  She will continue omeprazole with history of gastritis  And lovastatin for management of hyperlipidemia.  Diagnoses and all orders for this visit:    Supriya was seen today for follow-up.    Diagnoses and all orders for this visit:    SVT (supraventricular tachycardia) - S/P ablation 2015    Non-rheumatic mitral regurgitation    Vasovagal syncope    Palpitations    Nonrheumatic aortic valve insufficiency    Mild diastolic dysfunction        Natalia Aguirre MD  4/20/2018  11:00 AM

## 2018-07-02 RX ORDER — OMEPRAZOLE 40 MG/1
CAPSULE, DELAYED RELEASE ORAL
Qty: 30 CAPSULE | Refills: 11 | Status: SHIPPED | OUTPATIENT
Start: 2018-07-02 | End: 2019-06-25 | Stop reason: SDUPTHER

## 2018-07-02 RX ORDER — PAROXETINE HYDROCHLORIDE 20 MG/1
TABLET, FILM COATED ORAL
Qty: 30 TABLET | Refills: 11 | Status: SHIPPED | OUTPATIENT
Start: 2018-07-02 | End: 2019-06-25 | Stop reason: SDUPTHER

## 2018-07-26 PROCEDURE — 87086 URINE CULTURE/COLONY COUNT: CPT | Performed by: PHYSICIAN ASSISTANT

## 2018-07-29 ENCOUNTER — TELEPHONE (OUTPATIENT)
Dept: URGENT CARE | Facility: CLINIC | Age: 73
End: 2018-07-29

## 2018-08-20 RX ORDER — DILTIAZEM HYDROCHLORIDE 120 MG/1
CAPSULE, EXTENDED RELEASE ORAL
Qty: 30 CAPSULE | Refills: 6 | Status: SHIPPED | OUTPATIENT
Start: 2018-08-20 | End: 2019-03-14 | Stop reason: SDUPTHER

## 2018-10-09 ENCOUNTER — OFFICE VISIT (OUTPATIENT)
Dept: FAMILY MEDICINE CLINIC | Facility: CLINIC | Age: 73
End: 2018-10-09

## 2018-10-09 VITALS
HEART RATE: 76 BPM | DIASTOLIC BLOOD PRESSURE: 74 MMHG | BODY MASS INDEX: 23.32 KG/M2 | SYSTOLIC BLOOD PRESSURE: 130 MMHG | TEMPERATURE: 98 F | HEIGHT: 64 IN | WEIGHT: 136.6 LBS

## 2018-10-09 DIAGNOSIS — M62.838 MUSCLE SPASMS OF NECK: ICD-10-CM

## 2018-10-09 DIAGNOSIS — J30.1 SEASONAL ALLERGIC RHINITIS DUE TO POLLEN: Chronic | ICD-10-CM

## 2018-10-09 DIAGNOSIS — G44.209 TENSION HEADACHE: ICD-10-CM

## 2018-10-09 DIAGNOSIS — M47.22 OSTEOARTHRITIS OF SPINE WITH RADICULOPATHY, CERVICAL REGION: Primary | ICD-10-CM

## 2018-10-09 DIAGNOSIS — F41.1 GENERALIZED ANXIETY DISORDER: Chronic | ICD-10-CM

## 2018-10-09 DIAGNOSIS — K21.9 GASTROESOPHAGEAL REFLUX DISEASE, ESOPHAGITIS PRESENCE NOT SPECIFIED: Chronic | ICD-10-CM

## 2018-10-09 PROCEDURE — 99214 OFFICE O/P EST MOD 30 MIN: CPT | Performed by: INTERNAL MEDICINE

## 2018-10-09 RX ORDER — TIZANIDINE 4 MG/1
TABLET ORAL
Qty: 30 TABLET | Refills: 0 | Status: SHIPPED | OUTPATIENT
Start: 2018-10-09 | End: 2019-07-22 | Stop reason: SDUPTHER

## 2018-10-09 RX ORDER — MELOXICAM 15 MG/1
15 TABLET ORAL DAILY PRN
Qty: 30 TABLET | Refills: 0 | Status: SHIPPED | OUTPATIENT
Start: 2018-10-09 | End: 2019-07-22 | Stop reason: SDUPTHER

## 2018-10-09 NOTE — PROGRESS NOTES
"Subjective     History of Present Illness     Supriya Alberto is a 73 y.o. female who comes in today reporting gradual onset of upper back and right posterior neck pain as well asa dull right-sided headache.  Symptoms first started approximately 3 weeks ago.  She is also describing occasional symptoms of cervical radiculopathy.  She had moved some furniture prior to the onset of pain.  She has been taking two Tylenol Arthrtiis  once a day, which partially improves discomfort. She has been doing some range of motion exercises to help with flexibility of the neck and shoulders. She normally sleeps on her right side, but hasn't been able to lie on her right side to sleep due to the pain.  Exam reveals impressive trapezius muscle spasm, right worse than left with limited range of motion with rotation.      GERD symptoms are adequately controlled with Prilosec, which should allow her to tolerate a short course of NSAIDs.     She uses Flonase nasal spray and daily antihistamine to help manage seasonal allergy symptoms.      Review of Systems   Constitutional: Negative for chills, fatigue and fever.   HENT: Negative for congestion, ear pain, postnasal drip, sinus pressure and sore throat.    Respiratory: Negative for cough, shortness of breath and wheezing.    Cardiovascular: Negative for chest pain, palpitations and leg swelling.   Gastrointestinal: Negative for abdominal pain, blood in stool, constipation, diarrhea, nausea and vomiting.   Endocrine: Negative for cold intolerance, heat intolerance, polydipsia and polyuria.   Genitourinary: Negative for dysuria, frequency, hematuria and urgency.   Musculoskeletal: Positive for neck pain.   Skin: Negative for rash.   Neurological: Positive for headaches. Negative for syncope and weakness.        Objective     Vitals:    10/09/18 0905   BP: 130/74   Pulse: 76   Temp: 98 °F (36.7 °C)   TempSrc: Oral   Weight: 62 kg (136 lb 9.6 oz)   Height: 162.6 cm (64\")     Physical Exam "   Constitutional: She is oriented to person, place, and time. She appears well-developed and well-nourished. No distress.   HENT:   Head: Normocephalic and atraumatic.   Nose: Nose normal.   Mouth/Throat: Oropharynx is clear and moist. No oropharyngeal exudate.   Clear postnasal drip   Eyes: Pupils are equal, round, and reactive to light. EOM are normal.   Neck: Neck supple. No JVD present. No thyromegaly present.   Cardiovascular: Normal rate, regular rhythm and normal heart sounds.    Pulmonary/Chest: Effort normal and breath sounds normal. No accessory muscle usage. No respiratory distress. She has no wheezes. She has no rales.   Abdominal: Soft. Bowel sounds are normal. She exhibits no distension. There is no tenderness.   Musculoskeletal: She exhibits no edema.   Exam of neck reveals trapezius muscle spasm, right worse than left.  Limited range of motion with rotation.  Normal flexion and extension.  No vertebral tenderness or malalignment.  Mild kyphotic curvature in the upper thoracic and lower cervical spine   Lymphadenopathy:     She has no cervical adenopathy.   Neurological: She is alert and oriented to person, place, and time. No cranial nerve deficit.   Psychiatric: She has a normal mood and affect. Her speech is normal and behavior is normal. Judgment and thought content normal.     Future Appointments  Date Time Provider Department Center   3/25/2019 8:30 AM Feliz Valiente MD [facility] None   4/24/2019 9:30 AM Natalia Aguirre MD [facility] None       Assessment/Plan      We will send her for x-ray of the cervical spine.  A prescription is sent for Mobic 15 mg to take one q.d. with food and Zanaflex 4 mg 1/2 to 1 tablet q.h.s.  Monitor for GI upset.  Continue with daily Prilosec.  I recommended deep massage technique to help relieve the impressive trapezius muscle spasm, right greater than left.  She can increase the Tylenol Arthritis to take two tablets b.i.d. And may also benefit from warm moist  heat such as use of a rice bag or warm soak in the tub.     Relaxation techniques.  Continue the Paxil.    Continue Flonase nasal spray and daily nondrowsy antihistamine to help manage seasonal allergy symptoms.     Return next March for routine follow up with fasting labs one week prior or sooner if needed.     Scribed for Dr. Valiente by Samina Field [facility].      Diagnoses and all orders for this visit:    Osteoarthritis of spine with radiculopathy, cervical region  -     XR Spine Cervical 2 or 3 View    Muscle spasms of neck    Gastroesophageal reflux disease, esophagitis presence not specified    Tension headache    Seasonal allergic rhinitis due to pollen    Generalized anxiety disorder    Other orders  -     meloxicam (MOBIC) 15 MG tablet; Take 1 tablet by mouth Daily As Needed (pain). Take with food  -     tiZANidine (ZANAFLEX) 4 MG tablet; 1/2-1 qhs prn muscle spasms        No visits with results within 3 Week(s) from this visit.   Latest known visit with results is:   Admission on 07/26/2018, Discharged on 07/26/2018   Component Date Value Ref Range Status   • Color 07/26/2018 Yellow  Yellow, Straw, Dark Yellow, Marleny Final   • Clarity, UA 07/26/2018 Cloudy* Clear Final   • Glucose, UA 07/26/2018 Negative  Negative, 1000 mg/dL (3+) mg/dL Final   • Bilirubin 07/26/2018 Negative  Negative Final   • Ketones, UA 07/26/2018 Negative  Negative Final   • Specific Gravity  07/26/2018 1.005  1.005 - 1.030 Final   • Blood, UA 07/26/2018 3+* Negative Final   • pH, Urine 07/26/2018 6.0  5.0 - 8.0 Final   • Protein, POC 07/26/2018 Trace* Negative mg/dL Final   • Urobilinogen, UA 07/26/2018 Normal  Normal Final   • Leukocytes 07/26/2018 Small (1+)* Negative Final   • Nitrite, UA 07/26/2018 Negative  Negative Final   • Urine Culture 07/26/2018 No growth at 24 hours   Final   ]

## 2019-01-29 ENCOUNTER — OFFICE VISIT (OUTPATIENT)
Dept: FAMILY MEDICINE CLINIC | Facility: CLINIC | Age: 74
End: 2019-01-29

## 2019-01-29 VITALS
HEART RATE: 72 BPM | BODY MASS INDEX: 24.45 KG/M2 | WEIGHT: 138 LBS | HEIGHT: 63 IN | DIASTOLIC BLOOD PRESSURE: 70 MMHG | TEMPERATURE: 97.9 F | SYSTOLIC BLOOD PRESSURE: 138 MMHG

## 2019-01-29 DIAGNOSIS — L21.9 SEBORRHEIC DERMATITIS OF SCALP: Primary | ICD-10-CM

## 2019-01-29 DIAGNOSIS — L40.9 PSORIASIS OF SCALP: ICD-10-CM

## 2019-01-29 PROCEDURE — 99213 OFFICE O/P EST LOW 20 MIN: CPT | Performed by: INTERNAL MEDICINE

## 2019-01-29 PROCEDURE — 96372 THER/PROPH/DIAG INJ SC/IM: CPT | Performed by: INTERNAL MEDICINE

## 2019-01-29 RX ORDER — TRIAMCINOLONE ACETONIDE 40 MG/ML
20 INJECTION, SUSPENSION INTRA-ARTICULAR; INTRAMUSCULAR ONCE
Status: COMPLETED | OUTPATIENT
Start: 2019-01-29 | End: 2019-01-29

## 2019-01-29 RX ORDER — TRIAMCINOLONE ACETONIDE 1 MG/ML
LOTION TOPICAL 3 TIMES DAILY
Qty: 120 ML | Refills: 2 | Status: SHIPPED | OUTPATIENT
Start: 2019-01-29 | End: 2022-11-30

## 2019-01-29 RX ORDER — METHYLPREDNISOLONE ACETATE 80 MG/ML
80 INJECTION, SUSPENSION INTRA-ARTICULAR; INTRALESIONAL; INTRAMUSCULAR; SOFT TISSUE ONCE
Status: COMPLETED | OUTPATIENT
Start: 2019-01-29 | End: 2019-01-29

## 2019-01-29 RX ORDER — TRIAMCINOLONE ACETONIDE 1 MG/G
CREAM TOPICAL 3 TIMES DAILY
Qty: 80 G | Refills: 0 | Status: SHIPPED | OUTPATIENT
Start: 2019-01-29 | End: 2022-11-30

## 2019-01-29 RX ADMIN — METHYLPREDNISOLONE ACETATE 80 MG: 80 INJECTION, SUSPENSION INTRA-ARTICULAR; INTRALESIONAL; INTRAMUSCULAR; SOFT TISSUE at 08:52

## 2019-01-29 RX ADMIN — TRIAMCINOLONE ACETONIDE 20 MG: 40 INJECTION, SUSPENSION INTRA-ARTICULAR; INTRAMUSCULAR at 08:52

## 2019-01-29 NOTE — PROGRESS NOTES
Subjective     Supriya Alberto is a 73 y.o. female.     History of Present Illness     Supriya has been struggling with a very dry and itchy scalp for the past 6 weeks.  It has been worsening steadily and involving a larger amount of her scalp.  Symptoms started with winter heating season.  She has never experienced this before.  She saw her beautician yesterday who told her she felt she had psoriasis.  She has never had psoriasis involving other skin areas of the body.  She traditionally uses head and shoulders shampoo approximately 3 times weekly.  Last week, she switched to Selsun medicated shampoo.  She has been using some OTC hydrocortisone cream for itching.  The itching scalp is not keeping her awake at night.  She has been using hot water on the scalp.  We discussed how warmer water strips more the natural oils from the scalp.  She has not been using hair coloring or other irritating chemicals to the hair or scalp.    Her blood pressure and heart rate is well controlled today with diltiazem.    3 weeks ago, she and her  had URI/sinusitis symptoms and were seen in urgent care, where they received appropriate treatment.  She reports those URI and sinus symptoms have resolved.    Review of Systems   Constitutional: Negative for chills, fatigue and fever.   HENT: Positive for congestion and postnasal drip. Negative for ear pain, sinus pressure and sore throat.    Respiratory: Negative for cough, shortness of breath and wheezing.    Cardiovascular: Negative for chest pain, palpitations and leg swelling.   Gastrointestinal: Negative for abdominal pain, blood in stool, constipation, diarrhea, nausea and vomiting.   Endocrine: Negative for cold intolerance, heat intolerance, polydipsia and polyuria.   Genitourinary: Negative for dysuria, frequency, hematuria and urgency.   Skin: Negative for rash.   Neurological: Negative for syncope and weakness.       Objective     /70   Pulse 72   Temp 97.9 °F (36.6  "°C) (Oral)   Ht 160 cm (63\")   Wt 62.6 kg (138 lb)   BMI 24.45 kg/m²     Physical Exam   Constitutional: She is oriented to person, place, and time. She appears well-developed and well-nourished. No distress.   HENT:   Head: Normocephalic and atraumatic.   Nose: Right sinus exhibits no maxillary sinus tenderness and no frontal sinus tenderness. Left sinus exhibits no maxillary sinus tenderness and no frontal sinus tenderness.   Mouth/Throat: Uvula is midline, oropharynx is clear and moist and mucous membranes are normal. No oral lesions. No tonsillar exudate.   Mild nasal congestion.  Clear postnasal drip noted.   Eyes: Conjunctivae and EOM are normal. Pupils are equal, round, and reactive to light.   Neck: Trachea normal. Neck supple. No JVD present. Carotid bruit is not present. No tracheal deviation present. No thyroid mass and no thyromegaly present.   Cardiovascular: Normal rate, regular rhythm and normal heart sounds.  No extrasystoles are present. PMI is not displaced.   No murmur heard.  Pulmonary/Chest: Effort normal and breath sounds normal. No accessory muscle usage. No respiratory distress. She has no decreased breath sounds. She has no wheezes. She has no rhonchi. She has no rales.   Mildly diminished excursion on expiratory phase of cough bilaterally.  No wheezes today.   Abdominal: She exhibits no distension. There is no hepatosplenomegaly. There is no tenderness.   Mildly overweight abdomen.     Vascular Status -  Her right foot exhibits abnormal foot vasculature . Her right foot exhibits no edema. Her left foot exhibits abnormal foot vasculature . Her left foot exhibits no edema.  Lymphadenopathy:     She has no cervical adenopathy.   Neurological: She is alert and oriented to person, place, and time. No cranial nerve deficit. Coordination normal.   Skin: Skin is warm, dry and intact. No rash noted. No cyanosis. Nails show no clubbing.   Exam of the scalp reveals widespread dry, scaly, inflamed " eczematous skin.  Certainly has some psoriasis characteristics.  No evidence of secondary cellulitis.  No other similar skin lesions on other parts of the body.   Psychiatric: She has a normal mood and affect. Her speech is normal and behavior is normal. Thought content normal.   Vitals reviewed.      PHQ-2/PHQ-9 Depression Screening 1/29/2019   Little interest or pleasure in doing things 0   Feeling down, depressed, or hopeless 0   Trouble falling or staying asleep, or sleeping too much -   Feeling tired or having little energy -   Poor appetite or overeating -   Feeling bad about yourself - or that you are a failure or have let yourself or your family down -   Trouble concentrating on things, such as reading the newspaper or watching television -   Moving or speaking so slowly that other people could have noticed. Or the opposite - being so fidgety or restless that you have been moving around a lot more than usual -   Thoughts that you would be better off dead, or of hurting yourself in some way -   Total Score 0   If you checked off any problems, how difficult have these problems made it for you to do your work, take care of things at home, or get along with other people? -       Assessment/Plan     The patient receives injection today with Kenalog 20 mg and Depo-Medrol 80 mg IM without difficulty or complication.  We outlined a scalp treatment program, emphasizing symptom control and moisturization.  Use Kenalog lotion up to 3 times daily.  I have also given her some Kenalog cream to use as needed to help address episodes of itching.  Use a humidifier in the bedroom during winter heating season.  Use Selsun or head and shoulders shampoo approximately 3 times weekly, letting it sit and soak on the skin for approximately 10 minutes before rinsing thoroughly, then use a moisturizing conditioner.  Use cooler water when bathing and avoid more frequent bathing or washing of the hair.  She can consider an oral  treatment to the scalp, if needed.    Continue the current diltiazem.  Her blood pressure and heart rate are adequately controlled today.    Return if symptoms do not resolve.    Diagnoses and all orders for this visit:    Seborrheic dermatitis of scalp  -     methylPREDNISolone acetate (DEPO-medrol) injection 80 mg; Inject 1 mL into the appropriate muscle as directed by prescriber 1 (One) Time.  -     triamcinolone acetonide (KENALOG-40) injection 20 mg; Inject 0.5 mL into the appropriate muscle as directed by prescriber 1 (One) Time.    Psoriasis of scalp  -     methylPREDNISolone acetate (DEPO-medrol) injection 80 mg; Inject 1 mL into the appropriate muscle as directed by prescriber 1 (One) Time.  -     triamcinolone acetonide (KENALOG-40) injection 20 mg; Inject 0.5 mL into the appropriate muscle as directed by prescriber 1 (One) Time.    Other orders  -     triamcinolone (KENALOG) 0.1 % cream; Apply  topically to the appropriate area as directed 3 (Three) Times a Day. For itching  -     triamcinolone (KENALOG) 0.1 % lotion; Apply  topically to the appropriate area as directed 3 (Three) Times a Day.        No visits with results within 3 Week(s) from this visit.   Latest known visit with results is:   Admission on 07/26/2018, Discharged on 07/26/2018   Component Date Value Ref Range Status   • Color 07/26/2018 Yellow  Yellow, Straw, Dark Yellow, Marleny Final   • Clarity, UA 07/26/2018 Cloudy* Clear Final   • Glucose, UA 07/26/2018 Negative  Negative, 1000 mg/dL (3+) mg/dL Final   • Bilirubin 07/26/2018 Negative  Negative Final   • Ketones, UA 07/26/2018 Negative  Negative Final   • Specific Gravity  07/26/2018 1.005  1.005 - 1.030 Final   • Blood, UA 07/26/2018 3+* Negative Final   • pH, Urine 07/26/2018 6.0  5.0 - 8.0 Final   • Protein, POC 07/26/2018 Trace* Negative mg/dL Final   • Urobilinogen, UA 07/26/2018 Normal  Normal Final   • Leukocytes 07/26/2018 Small (1+)* Negative Final   • Nitrite, UA 07/26/2018  Negative  Negative Final   • Urine Culture 07/26/2018 No growth at 24 hours   Final   ]

## 2019-02-20 DIAGNOSIS — Z12.31 ENCOUNTER FOR SCREENING MAMMOGRAM FOR MALIGNANT NEOPLASM OF BREAST: Primary | ICD-10-CM

## 2019-03-15 RX ORDER — DILTIAZEM HYDROCHLORIDE 120 MG/1
CAPSULE, EXTENDED RELEASE ORAL
Qty: 30 CAPSULE | Refills: 1 | Status: SHIPPED | OUTPATIENT
Start: 2019-03-15 | End: 2019-05-16 | Stop reason: SDUPTHER

## 2019-03-18 ENCOUNTER — LAB (OUTPATIENT)
Dept: LAB | Facility: OTHER | Age: 74
End: 2019-03-18

## 2019-03-18 DIAGNOSIS — M85.89 OSTEOPENIA OF MULTIPLE SITES: Chronic | ICD-10-CM

## 2019-03-18 DIAGNOSIS — E78.2 MIXED HYPERLIPIDEMIA: Chronic | ICD-10-CM

## 2019-03-18 LAB
25(OH)D3 SERPL-MCNC: 33.5 NG/ML (ref 30–100)
ALBUMIN SERPL-MCNC: 4.1 G/DL (ref 3.5–5)
ALBUMIN/GLOB SERPL: 1.2 G/DL (ref 1.1–1.8)
ALP SERPL-CCNC: 127 U/L (ref 38–126)
ALT SERPL W P-5'-P-CCNC: 15 U/L
ANION GAP SERPL CALCULATED.3IONS-SCNC: 6 MMOL/L (ref 5–15)
AST SERPL-CCNC: 19 U/L (ref 14–36)
BASOPHILS # BLD AUTO: 0.03 10*3/MM3 (ref 0–0.2)
BASOPHILS NFR BLD AUTO: 0.4 % (ref 0–2)
BILIRUB SERPL-MCNC: 0.5 MG/DL (ref 0.2–1.3)
BUN BLD-MCNC: 11 MG/DL (ref 7–17)
BUN/CREAT SERPL: 15.3 (ref 7–25)
CALCIUM SPEC-SCNC: 9.5 MG/DL (ref 8.4–10.2)
CHLORIDE SERPL-SCNC: 104 MMOL/L (ref 98–107)
CHOLEST SERPL-MCNC: 253 MG/DL (ref 150–200)
CO2 SERPL-SCNC: 27 MMOL/L (ref 22–30)
CREAT BLD-MCNC: 0.72 MG/DL (ref 0.52–1.04)
DEPRECATED RDW RBC AUTO: 45.8 FL (ref 36.4–46.3)
EOSINOPHIL # BLD AUTO: 0.17 10*3/MM3 (ref 0–0.7)
EOSINOPHIL NFR BLD AUTO: 2.5 % (ref 0–7)
ERYTHROCYTE [DISTWIDTH] IN BLOOD BY AUTOMATED COUNT: 13.3 % (ref 11.5–14.5)
GFR SERPL CREATININE-BSD FRML MDRD: 80 ML/MIN/1.73 (ref 39–90)
GLOBULIN UR ELPH-MCNC: 3.5 GM/DL (ref 2.3–3.5)
GLUCOSE BLD-MCNC: 107 MG/DL (ref 74–99)
HCT VFR BLD AUTO: 39.1 % (ref 35–45)
HDLC SERPL-MCNC: 121 MG/DL (ref 40–59)
HGB BLD-MCNC: 12.8 G/DL (ref 12–15.5)
LDLC SERPL CALC-MCNC: 108 MG/DL
LDLC/HDLC SERPL: 0.9 {RATIO} (ref 0–3.22)
LYMPHOCYTES # BLD AUTO: 2.1 10*3/MM3 (ref 0.6–4.2)
LYMPHOCYTES NFR BLD AUTO: 31.3 % (ref 10–50)
MCH RBC QN AUTO: 31.3 PG (ref 26.5–34)
MCHC RBC AUTO-ENTMCNC: 32.7 G/DL (ref 31.4–36)
MCV RBC AUTO: 95.6 FL (ref 80–98)
MONOCYTES # BLD AUTO: 0.77 10*3/MM3 (ref 0–0.9)
MONOCYTES NFR BLD AUTO: 11.5 % (ref 0–12)
NEUTROPHILS # BLD AUTO: 3.64 10*3/MM3 (ref 2–8.6)
NEUTROPHILS NFR BLD AUTO: 54.3 % (ref 37–80)
PLATELET # BLD AUTO: 350 10*3/MM3 (ref 150–450)
PMV BLD AUTO: 8.9 FL (ref 8–12)
POTASSIUM BLD-SCNC: 4.1 MMOL/L (ref 3.4–5)
PROT SERPL-MCNC: 7.6 G/DL (ref 6.3–8.2)
RBC # BLD AUTO: 4.09 10*6/MM3 (ref 3.77–5.16)
SODIUM BLD-SCNC: 137 MMOL/L (ref 137–145)
TRIGL SERPL-MCNC: 118 MG/DL
VLDLC SERPL-MCNC: 23.6 MG/DL
WBC NRBC COR # BLD: 6.71 10*3/MM3 (ref 3.2–9.8)

## 2019-03-18 PROCEDURE — 36415 COLL VENOUS BLD VENIPUNCTURE: CPT | Performed by: INTERNAL MEDICINE

## 2019-03-18 PROCEDURE — 85025 COMPLETE CBC W/AUTO DIFF WBC: CPT | Performed by: INTERNAL MEDICINE

## 2019-03-18 PROCEDURE — 82306 VITAMIN D 25 HYDROXY: CPT | Performed by: INTERNAL MEDICINE

## 2019-03-18 PROCEDURE — 80061 LIPID PANEL: CPT | Performed by: INTERNAL MEDICINE

## 2019-03-18 PROCEDURE — 80053 COMPREHEN METABOLIC PANEL: CPT | Performed by: INTERNAL MEDICINE

## 2019-03-25 ENCOUNTER — OFFICE VISIT (OUTPATIENT)
Dept: FAMILY MEDICINE CLINIC | Facility: CLINIC | Age: 74
End: 2019-03-25

## 2019-03-25 VITALS
BODY MASS INDEX: 23.71 KG/M2 | TEMPERATURE: 98.1 F | HEIGHT: 63 IN | SYSTOLIC BLOOD PRESSURE: 146 MMHG | HEART RATE: 72 BPM | DIASTOLIC BLOOD PRESSURE: 80 MMHG | WEIGHT: 133.8 LBS

## 2019-03-25 DIAGNOSIS — I47.1 SVT (SUPRAVENTRICULAR TACHYCARDIA) (HCC): Chronic | ICD-10-CM

## 2019-03-25 DIAGNOSIS — E78.2 MIXED HYPERLIPIDEMIA: Chronic | ICD-10-CM

## 2019-03-25 DIAGNOSIS — F32.A DEPRESSIVE DISORDER: Chronic | ICD-10-CM

## 2019-03-25 DIAGNOSIS — Z00.00 MEDICARE ANNUAL WELLNESS VISIT, INITIAL: Primary | ICD-10-CM

## 2019-03-25 DIAGNOSIS — K21.9 GASTROESOPHAGEAL REFLUX DISEASE, ESOPHAGITIS PRESENCE NOT SPECIFIED: Chronic | ICD-10-CM

## 2019-03-25 DIAGNOSIS — L21.9 SEBORRHEIC DERMATITIS OF SCALP: ICD-10-CM

## 2019-03-25 DIAGNOSIS — M85.89 OSTEOPENIA OF MULTIPLE SITES: Chronic | ICD-10-CM

## 2019-03-25 DIAGNOSIS — I34.0 NON-RHEUMATIC MITRAL REGURGITATION: Chronic | ICD-10-CM

## 2019-03-25 DIAGNOSIS — I83.813 VARICOSE VEINS OF BOTH LOWER EXTREMITIES WITH PAIN: Chronic | ICD-10-CM

## 2019-03-25 DIAGNOSIS — F41.1 GENERALIZED ANXIETY DISORDER: Chronic | ICD-10-CM

## 2019-03-25 PROCEDURE — G0438 PPPS, INITIAL VISIT: HCPCS | Performed by: INTERNAL MEDICINE

## 2019-03-25 PROCEDURE — 99214 OFFICE O/P EST MOD 30 MIN: CPT | Performed by: INTERNAL MEDICINE

## 2019-03-25 RX ORDER — FLUTICASONE PROPIONATE 50 MCG
1 SPRAY, SUSPENSION (ML) NASAL 2 TIMES DAILY
COMMUNITY
Start: 2019-02-18 | End: 2019-07-23 | Stop reason: SDUPTHER

## 2019-03-25 NOTE — PROGRESS NOTES
QUICK REFERENCE INFORMATION:  The ABCs of the Annual Wellness Visit    Initial Medicare Wellness Visit    HEALTH RISK ASSESSMENT    1945    Recent Hospitalizations:  No hospitalization(s) within the last year..        Current Medical Providers:  Patient Care Team:  Feliz Valiente MD as PCP - General (Internal Medicine)        Smoking Status:  Social History     Tobacco Use   Smoking Status Never Smoker   Smokeless Tobacco Never Used       Alcohol Consumption:  Social History     Substance and Sexual Activity   Alcohol Use No       Depression Screen:   PHQ-2/PHQ-9 Depression Screening 3/25/2019   Little interest or pleasure in doing things 1   Feeling down, depressed, or hopeless 1   Trouble falling or staying asleep, or sleeping too much 0   Feeling tired or having little energy 1   Poor appetite or overeating 0   Feeling bad about yourself - or that you are a failure or have let yourself or your family down 0   Trouble concentrating on things, such as reading the newspaper or watching television 0   Moving or speaking so slowly that other people could have noticed. Or the opposite - being so fidgety or restless that you have been moving around a lot more than usual 0   Thoughts that you would be better off dead, or of hurting yourself in some way 0   Total Score 3   If you checked off any problems, how difficult have these problems made it for you to do your work, take care of things at home, or get along with other people? Not difficult at all       Health Habits and Functional and Cognitive Screening:  Functional & Cognitive Status 3/25/2019   Do you have difficulty preparing food and eating? No   Do you have difficulty bathing yourself, getting dressed or grooming yourself? No   Do you have difficulty using the toilet? No   Do you have difficulty moving around from place to place? No   Do you have trouble with steps or getting out of a bed or a chair? No   In the past year have you fallen or experienced a  near fall? No   Current Diet Well Balanced Diet   Dental Exam Up to date   Eye Exam Up to date   Exercise (times per week) 7 times per week   Current Exercise Activities Include Housecleaning   Do you need help using the phone?  No   Are you deaf or do you have serious difficulty hearing?  No   Do you need help with transportation? No   Do you need help shopping? No   Do you need help preparing meals?  No   Do you need help with housework?  No   Do you need help with laundry? No   Do you need help taking your medications? No   Do you need help managing money? No   Do you ever drive or ride in a car without wearing a seat belt? No   Have you felt unusual stress, anger or loneliness in the last month? Yes   Who do you live with? Spouse   If you need help, do you have trouble finding someone available to you? No   Have you been bothered in the last four weeks by sexual problems? No   Do you have difficulty concentrating, remembering or making decisions? Yes           Does the patient have evidence of cognitive impairment? No    Asiprin use counseling: Does not need ASA (and currently is not on it)      Recent Lab Results:    Visual Acuity:  No exam data present    Age-appropriate Screening Schedule:  Refer to the list below for future screening recommendations based on patient's age, sex and/or medical conditions. Orders for these recommended tests are listed in the plan section. The patient has been provided with a written plan.    Health Maintenance   Topic Date Due   • ZOSTER VACCINE (1 of 2) 04/26/1997   • PNEUMOCOCCAL VACCINES (65+ LOW/MEDIUM RISK) (2 of 2 - PPSV23) 09/20/2019   • LIPID PANEL  03/18/2020   • MAMMOGRAM  03/27/2020   • DXA SCAN  03/27/2020   • COLONOSCOPY  04/08/2024   • TDAP/TD VACCINES (2 - Td) 03/01/2028   • INFLUENZA VACCINE  Completed        Subjective   History of Present Illness    Supriya Alberto is a 73 y.o. female who presents for an Annual Wellness Visit.    The following portions of the  patient's history were reviewed and updated as appropriate: allergies, current medications, past family history, past medical history, past social history, past surgical history and problem list.    Outpatient Medications Prior to Visit   Medication Sig Dispense Refill   • Calcium Carbonate-Vit D-Min (CALTRATE 600+D PLUS PO) Take 1 tablet by mouth 2 (Two) Times a Day.     • CARTIA  MG 24 hr capsule TAKE 1 CAPSULE BY MOUTH ONCE DAILY 30 capsule 1   • fluticasone (FLONASE) 50 MCG/ACT nasal spray 1 spray by Each Nare route 2 (Two) Times a Day.     • meloxicam (MOBIC) 15 MG tablet Take 1 tablet by mouth Daily As Needed (pain). Take with food 30 tablet 0   • omeprazole (priLOSEC) 40 MG capsule TAKE ONE CAPSULE BY MOUTH ONCE DAILY IN THE MORNING FOR REFLUX 30 capsule 11   • PARoxetine (PAXIL) 20 MG tablet TAKE ONE TABLET BY MOUTH ONCE DAILY AT BEDTIME 30 tablet 11   • triamcinolone (KENALOG) 0.1 % cream Apply  topically to the appropriate area as directed 3 (Three) Times a Day. For itching 80 g 0   • triamcinolone (KENALOG) 0.1 % lotion Apply  topically to the appropriate area as directed 3 (Three) Times a Day. 120 mL 2   • guaiFENesin (MUCINEX) 600 MG 12 hr tablet Take 1 tablet by mouth 2 (Two) Times a Day. 14 tablet 0   • tiZANidine (ZANAFLEX) 4 MG tablet 1/2-1 qhs prn muscle spasms 30 tablet 0     No facility-administered medications prior to visit.        Patient Active Problem List   Diagnosis   • Rosacea   • Prolapse of vaginal vault after hysterectomy   • SVT (supraventricular tachycardia) - S/P ablation 2015   • Palpitations   • Osteopenia   • Menopause   • Malaise and fatigue   • Hyperlipidemia   • GERD (gastroesophageal reflux disease)   • Generalized anxiety disorder   • Jw hematuria   • Eustachian tube disorder   • Dizziness   • Diverticulitis of sigmoid colon   • Depressive disorder   • Constipation   • Angina pectoris (CMS/HCC)   • Allergic rhinitis   • Mitral valve regurgitation-moderate   • Mild  "diastolic dysfunction   • Nonrheumatic aortic valve insufficiency   • Varicose veins of both lower extremities       Advance Care Planning:  has NO advance directive - information provided to the patient today    Identification of Risk Factors:  Risk factors include: cardiovascular risk.    Review of Systems    Compared to one year ago, the patient feels her physical health is the same.  Compared to one year ago, the patient feels her mental health is the same.    Objective     Physical Exam    Vitals:    03/25/19 0837   BP: 146/80   Pulse: 72   Temp: 98.1 °F (36.7 °C)   TempSrc: Oral   Weight: 60.7 kg (133 lb 12.8 oz)   Height: 160 cm (63\")       Patient's Body mass index is 23.7 kg/m². BMI is within normal parameters. No follow-up required..      Assessment/Plan   Patient Self-Management and Personalized Health Advice  The patient has been provided with information about: exercise, weight management and designing advance directives and preventive services including:   · Advance directive, Bone densitometry screening, Exercise counseling provided, Fall Risk assessment done, Influenza vaccine, Nutrition counseling provided, Pneumococcal vaccine .    Visit Diagnoses:    ICD-10-CM ICD-9-CM   1. Medicare annual wellness visit, initial Z00.00 V70.0   2. Mixed hyperlipidemia E78.2 272.2   3. SVT (supraventricular tachycardia) - S/P ablation 2015 I47.1 427.89   4. Non-rheumatic mitral regurgitation I34.0 424.0   5. Varicose veins of both lower extremities with pain I83.813 454.8   6. Gastroesophageal reflux disease, esophagitis presence not specified K21.9 530.81   7. Osteopenia of multiple sites M85.89 733.90   8. Seborrheic dermatitis of scalp L21.9 690.18   9. Generalized anxiety disorder F41.1 300.02   10. Depressive disorder F32.9 311       Orders Placed This Encounter   Procedures   • CBC Auto Differential     Standing Status:   Future     Standing Expiration Date:   3/25/2020   • Comprehensive Metabolic Panel     " Standing Status:   Future     Standing Expiration Date:   3/25/2020   • Lipid Panel     Standing Status:   Future     Standing Expiration Date:   3/25/2020   • Vitamin D 25 Hydroxy     Standing Status:   Future     Standing Expiration Date:   3/25/2020   • TSH     Standing Status:   Future     Standing Expiration Date:   3/25/2020   • Ambulatory Referral to Dermatology     Referral Priority:   Routine     Referral Type:   Consultation     Referral Reason:   Specialty Services Required     Referred to Provider:   Eduardo Bergeron MD     Requested Specialty:   Dermatology     Number of Visits Requested:   1       Outpatient Encounter Medications as of 3/25/2019   Medication Sig Dispense Refill   • Calcium Carbonate-Vit D-Min (CALTRATE 600+D PLUS PO) Take 1 tablet by mouth 2 (Two) Times a Day.     • CARTIA  MG 24 hr capsule TAKE 1 CAPSULE BY MOUTH ONCE DAILY 30 capsule 1   • fluticasone (FLONASE) 50 MCG/ACT nasal spray 1 spray by Each Nare route 2 (Two) Times a Day.     • meloxicam (MOBIC) 15 MG tablet Take 1 tablet by mouth Daily As Needed (pain). Take with food 30 tablet 0   • omeprazole (priLOSEC) 40 MG capsule TAKE ONE CAPSULE BY MOUTH ONCE DAILY IN THE MORNING FOR REFLUX 30 capsule 11   • PARoxetine (PAXIL) 20 MG tablet TAKE ONE TABLET BY MOUTH ONCE DAILY AT BEDTIME 30 tablet 11   • triamcinolone (KENALOG) 0.1 % cream Apply  topically to the appropriate area as directed 3 (Three) Times a Day. For itching 80 g 0   • triamcinolone (KENALOG) 0.1 % lotion Apply  topically to the appropriate area as directed 3 (Three) Times a Day. 120 mL 2   • guaiFENesin (MUCINEX) 600 MG 12 hr tablet Take 1 tablet by mouth 2 (Two) Times a Day. 14 tablet 0   • tiZANidine (ZANAFLEX) 4 MG tablet 1/2-1 qhs prn muscle spasms 30 tablet 0     No facility-administered encounter medications on file as of 3/25/2019.        Reviewed use of high risk medication in the elderly: not applicable  Reviewed for potential of harmful drug  interactions in the elderly: not applicable    Follow Up:  Return in about 1 year (around 3/25/2020).     An After Visit Summary and PPPS with all of these plans were given to the patient.

## 2019-03-25 NOTE — PROGRESS NOTES
Subjective        History of Present Illness     Supriya Alberto is a 73 y.o. female who presents for annual exam.  Chronic medical issues include high cholesterol, PSVT, GERD/gastritis, osteopenia, generalized anxiety disorder, seasonal allergies, mild intermittent asthma, and dysthymia among other issues.  Dr. Aguirre performed successful pathway ablation 02/2015 and continues to follow her annually for moderate mitral valve regurgitation.  She continues on diltiazem and has a follow up appointment scheduled in April.  She feels Paxil is adequately managing her CRISTOBAL.  GERD symptoms adequately controlled with Prilosec.  Dr. Aguilar is addressing a small white lesion up left upper eyelid.    We gave patient a steroid injection and a prescription for topical steroid cream for psoriasis and seborrheic dermatitis of the scalp in January.  She continues to struggle, although, she  has had some mild relief with an OTC hair conditioner.  We discussed referral to dermatology and she would like to proceed with this.         DEXA 03/2018 revealed persistent osteopenia.  She continues calcium and vitamin D supplements.  She is scheduled for mammogram this Thursday (March 28).      We allowed her to take a 1-year break on statin therapy due to myalgias.  She continues to experience the myalgias despite the break, although, her lipid panel has not show significant decline .       Weight is down 3 pounds in the past six months.  Blood pressure at goal.     Labs reveal mild impaired fasting glucose, for which I recommended she monitor sugar and carbohydrates.    The patient's relevant past medical, surgical, and social history was reviewed in Epic.   Lab results are reviewed with the patient today.  CBC unremarkable. Fasting glucose 107.  Normal renal and liver function.  Total cholesterol 253. . .  Triglycerides 118.  LDL/HDL ratio 0.90.       Review of Systems   Constitutional: Negative for chills, fatigue and fever.  "  HENT: Negative for congestion, ear pain, postnasal drip, sinus pressure and sore throat.    Respiratory: Negative for cough, shortness of breath and wheezing.    Cardiovascular: Negative for chest pain, palpitations and leg swelling.   Gastrointestinal: Negative for abdominal pain, blood in stool, constipation, diarrhea, nausea and vomiting.   Endocrine: Negative for cold intolerance, heat intolerance, polydipsia and polyuria.   Genitourinary: Negative for dysuria, frequency, hematuria and urgency.   Skin: Negative for rash.   Neurological: Negative for syncope and weakness.        Objective     Vitals:    03/25/19 0837   BP: 146/80   Pulse: 72   Temp: 98.1 °F (36.7 °C)   TempSrc: Oral   Weight: 60.7 kg (133 lb 12.8 oz)   Height: 160 cm (63\")     Physical Exam   Constitutional: She is oriented to person, place, and time. She appears well-developed and well-nourished. No distress.   HENT:   Head: Normocephalic and atraumatic.   Nose: Right sinus exhibits no maxillary sinus tenderness and no frontal sinus tenderness. Left sinus exhibits no maxillary sinus tenderness and no frontal sinus tenderness.   Mouth/Throat: Uvula is midline, oropharynx is clear and moist and mucous membranes are normal. No oral lesions. No tonsillar exudate.   Clear postnasal drip.    Eyes: Conjunctivae and EOM are normal. Pupils are equal, round, and reactive to light.   Small white lesion up left upper eyelid.     Neck: Trachea normal. Neck supple. No JVD present. Carotid bruit is not present. No tracheal deviation present. No thyroid mass and no thyromegaly present.   Cardiovascular: Normal rate, regular rhythm and intact distal pulses.  No extrasystoles are present. PMI is not displaced.   Murmur heard.  2/6 systolic murmur left sternal border   Pulmonary/Chest: Effort normal and breath sounds normal. No accessory muscle usage. No respiratory distress. She has no decreased breath sounds. She has no wheezes. She has no rhonchi. She has no " rales.   Abdominal: Soft. Bowel sounds are normal. She exhibits no distension. There is no hepatosplenomegaly. There is no tenderness.   Musculoskeletal:   Thoracic curvature of the spine.      Vascular Status -  Her right foot exhibits normal foot vasculature  and no edema. Her left foot exhibits normal foot vasculature  and no edema.  Lymphadenopathy:     She has no cervical adenopathy.   Neurological: She is alert and oriented to person, place, and time. No cranial nerve deficit. Coordination normal.   Skin: Skin is warm, dry and intact. Rash noted. No cyanosis. Nails show no clubbing.   Dermatitis of the scalp noted, likely seborrheic dermatitis   Psychiatric: She has a normal mood and affect. Her speech is normal and behavior is normal. Judgment and thought content normal.   Vitals reviewed.    Future Appointments   Date Time Provider Department Center   3/28/2019  2:00 PM [facility] MAMM 1 [facility] [place]   4/24/2019  9:30 AM Natalia Aguirre MD [facility] None   3/27/2020  9:00 AM Feliz Valiente MD [facility] None       Assessment/Plan      Refer to Dr. Bergeron, dermatology, for the seborrheic dermatitis of the scalp.     Continue the extended release diltiazem for her PSVT.     Continue Paxil for CRISTOBAL.     She can remain off statin for now.  We will continue to monitor her lipids.       Continue vitamin D and calcium supplement. Recommended 3-4 days of limited sun exposure to increase vitamin D.   She will be due repeat DEXA 03/2020.      Keep her appointment for annual mammogram on Thursday.     Continue the Prilosec for GERD.    Continue her other medications and vitamin/mineral supplements to treat the other medical issues we addressed today.    Return for follow up in one year for routine exam with fasting labs one week prior.       Scribed for Dr. Valiente by Samina Field [facility].     Diagnoses and all orders for this visit:    Medicare annual wellness visit, initial    Mixed hyperlipidemia  -     CBC  Auto Differential; Future  -     Comprehensive Metabolic Panel; Future  -     Lipid Panel; Future  -     TSH; Future    SVT (supraventricular tachycardia) - S/P ablation 2015  -     TSH; Future    Non-rheumatic mitral regurgitation    Varicose veins of both lower extremities with pain    Gastroesophageal reflux disease, esophagitis presence not specified    Osteopenia of multiple sites  -     Vitamin D 25 Hydroxy; Future    Seborrheic dermatitis of scalp  -     Ambulatory Referral to Dermatology    Generalized anxiety disorder    Depressive disorder    Other orders  -     fluticasone (FLONASE) 50 MCG/ACT nasal spray; 1 spray by Each Nare route 2 (Two) Times a Day.        Lab on 03/18/2019   Component Date Value Ref Range Status   • WBC 03/18/2019 6.71  3.20 - 9.80 10*3/mm3 Final   • RBC 03/18/2019 4.09  3.77 - 5.16 10*6/mm3 Final   • Hemoglobin 03/18/2019 12.8  12.0 - 15.5 g/dL Final   • Hematocrit 03/18/2019 39.1  35.0 - 45.0 % Final   • MCV 03/18/2019 95.6  80.0 - 98.0 fL Final   • MCH 03/18/2019 31.3  26.5 - 34.0 pg Final   • MCHC 03/18/2019 32.7  31.4 - 36.0 g/dL Final   • RDW 03/18/2019 13.3  11.5 - 14.5 % Final   • RDW-SD 03/18/2019 45.8  36.4 - 46.3 fl Final   • MPV 03/18/2019 8.9  8.0 - 12.0 fL Final   • Platelets 03/18/2019 350  150 - 450 10*3/mm3 Final   • Neutrophil % 03/18/2019 54.3  37.0 - 80.0 % Final   • Lymphocyte % 03/18/2019 31.3  10.0 - 50.0 % Final   • Monocyte % 03/18/2019 11.5  0.0 - 12.0 % Final   • Eosinophil % 03/18/2019 2.5  0.0 - 7.0 % Final   • Basophil % 03/18/2019 0.4  0.0 - 2.0 % Final   • Neutrophils, Absolute 03/18/2019 3.64  2.00 - 8.60 10*3/mm3 Final   • Lymphocytes, Absolute 03/18/2019 2.10  0.60 - 4.20 10*3/mm3 Final   • Monocytes, Absolute 03/18/2019 0.77  0.00 - 0.90 10*3/mm3 Final   • Eosinophils, Absolute 03/18/2019 0.17  0.00 - 0.70 10*3/mm3 Final   • Basophils, Absolute 03/18/2019 0.03  0.00 - 0.20 10*3/mm3 Final   • Glucose 03/18/2019 107* 74 - 99 mg/dL Final   • BUN  03/18/2019 11  7 - 17 mg/dL Final   • Creatinine 03/18/2019 0.72  0.52 - 1.04 mg/dL Final   • Sodium 03/18/2019 137  137 - 145 mmol/L Final   • Potassium 03/18/2019 4.1  3.4 - 5.0 mmol/L Final   • Chloride 03/18/2019 104  98 - 107 mmol/L Final   • CO2 03/18/2019 27.0  22.0 - 30.0 mmol/L Final   • Calcium 03/18/2019 9.5  8.4 - 10.2 mg/dL Final   • Total Protein 03/18/2019 7.6  6.3 - 8.2 g/dL Final   • Albumin 03/18/2019 4.10  3.50 - 5.00 g/dL Final   • ALT (SGPT) 03/18/2019 15  <=35 U/L Final   • AST (SGOT) 03/18/2019 19  14 - 36 U/L Final   • Alkaline Phosphatase 03/18/2019 127* 38 - 126 U/L Final   • Total Bilirubin 03/18/2019 0.5  0.2 - 1.3 mg/dL Final   • eGFR Non African Amer 03/18/2019 80  39 - 90 mL/min/1.73 Final   • Globulin 03/18/2019 3.5  2.3 - 3.5 gm/dL Final   • A/G Ratio 03/18/2019 1.2  1.1 - 1.8 g/dL Final   • BUN/Creatinine Ratio 03/18/2019 15.3  7.0 - 25.0 Final   • Anion Gap 03/18/2019 6.0  5.0 - 15.0 mmol/L Final   • Total Cholesterol 03/18/2019 253* 150 - 200 mg/dL Final   • Triglycerides 03/18/2019 118  <=150 mg/dL Final   • HDL Cholesterol 03/18/2019 121* 40 - 59 mg/dL Final   • LDL Cholesterol  03/18/2019 108* <=100 mg/dL Final   • VLDL Cholesterol 03/18/2019 23.6  mg/dL Final   • LDL/HDL Ratio 03/18/2019 0.90  0.00 - 3.22 Final   • 25 Hydroxy, Vitamin D 03/18/2019 33.5  30.0 - 100.0 ng/ml Final   ]

## 2019-03-25 NOTE — PATIENT INSTRUCTIONS
Medicare Wellness  Personal Prevention Plan of Service     Date of Office Visit:  2019  Encounter Provider:  Feliz Valiente MD  Place of Service:  [facility]  Patient Name: Supriya lAberto  :  1945    As part of the Medicare Wellness portion of your visit today, we are providing you with this personalized preventive plan of services (PPPS). This plan is based upon recommendations of the [place] Preventive Services Task Force (USPSTF) and the Advisory Committee on Immunization Practices (ACIP).    This lists the preventive care services that should be considered, and provides dates of when you are due. Items listed as completed are up-to-date and do not require any further intervention.    Health Maintenance   Topic Date Due   • ZOSTER VACCINE (1 of 2) 1997   • HEPATITIS C SCREENING  11/10/2016   • MEDICARE ANNUAL WELLNESS  11/10/2016   • PNEUMOCOCCAL VACCINES (65+ LOW/MEDIUM RISK) (2 of 2 - PPSV23) 2019   • LIPID PANEL  2020   • MAMMOGRAM  2020   • DXA SCAN  2020   • COLONOSCOPY  2024   • TDAP/TD VACCINES (2 - Td) 2028   • INFLUENZA VACCINE  Completed       No orders of the defined types were placed in this encounter.      Return in about 1 year (around 3/25/2020).

## 2019-04-23 DIAGNOSIS — R00.2 PALPITATIONS: Primary | ICD-10-CM

## 2019-04-24 ENCOUNTER — OFFICE VISIT (OUTPATIENT)
Dept: CARDIOLOGY | Facility: CLINIC | Age: 74
End: 2019-04-24

## 2019-04-24 VITALS
WEIGHT: 131.5 LBS | SYSTOLIC BLOOD PRESSURE: 140 MMHG | HEIGHT: 64 IN | DIASTOLIC BLOOD PRESSURE: 78 MMHG | HEART RATE: 64 BPM | BODY MASS INDEX: 22.45 KG/M2

## 2019-04-24 DIAGNOSIS — I34.0 NON-RHEUMATIC MITRAL REGURGITATION: Chronic | ICD-10-CM

## 2019-04-24 DIAGNOSIS — E78.2 MIXED HYPERLIPIDEMIA: Chronic | ICD-10-CM

## 2019-04-24 DIAGNOSIS — I35.1 NONRHEUMATIC AORTIC VALVE INSUFFICIENCY: ICD-10-CM

## 2019-04-24 DIAGNOSIS — I51.9 MILD DIASTOLIC DYSFUNCTION: ICD-10-CM

## 2019-04-24 DIAGNOSIS — I47.1 SVT (SUPRAVENTRICULAR TACHYCARDIA) (HCC): Primary | Chronic | ICD-10-CM

## 2019-04-24 PROCEDURE — 99214 OFFICE O/P EST MOD 30 MIN: CPT | Performed by: INTERNAL MEDICINE

## 2019-04-24 PROCEDURE — 93000 ELECTROCARDIOGRAM COMPLETE: CPT | Performed by: INTERNAL MEDICINE

## 2019-04-24 RX ORDER — KETOCONAZOLE 20 MG/ML
SHAMPOO TOPICAL 2 TIMES WEEKLY
COMMUNITY

## 2019-04-24 NOTE — PROGRESS NOTES
Supriya Alberto  73 y.o. female    04/24/2019  1. SVT (supraventricular tachycardia) - S/P ablation 2015    2. Mixed hyperlipidemia    3. Non-rheumatic mitral regurgitation    4. Nonrheumatic aortic valve insufficiency    5. Mild diastolic dysfunction        History of Present Illness:    Patient's Body mass index is 21.97 kg/m². BMI is within normal parameters. No follow-up required.   .  73 years old patient  underwent cardiac catheterization noted to have normal coronaries with normal left systolic function.  Patient has symptomatic supraventricular tachycardia underwent EP study and successful slow pathway ablation.  Patient complains nonsustained palpitation with activity mechanism and undefined..  Normal left systolic function with a mild  aortic and tricuspid regurgitation.  There is a moderate mitral regurgitation.  Patient denies orthopnea, PND, nauseous, vomiting, diarrhea.  Denies intermittent claudication.  Denies any palpitation or fluttering.  Denies any syncope or near syncopal episode.     3/2019    Total Cholesterol 150 - 200 mg/dL 253 Abnormally high     Triglycerides <=150 mg/dL 118    HDL Cholesterol 40 - 59 mg/dL 121 Abnormally high     LDL Cholesterol  <=100 mg/dL 108 Abnormally high     VLDL Cholesterol mg/dL 23.6    LDL/HDL Ratio 0.00 - 3.22 0.90          3/2018  Total Cholesterol 150 - 200 mg/dL 268     Triglycerides 35 - 160 mg/dL 103    HDL Cholesterol 35 - 100 mg/dL 114     LDL Cholesterol  mg/dL 133    VLDL Cholesterol mg/dL 20.6    LDL/HDL Ratio   1.17         ECHO 4/17/2017  · Left ventricular function is normal. Estimated EF = 60%.  · Left ventricular diastolic dysfunction (grade I) consistent with impaired relaxation.  · Mild aortic valve regurgitation is present.  · Moderate mitral valve regurgitation is present  · Mild tricuspid valve regurgitation is present              SUBJECTIVE:    Allergies   Allergen Reactions   • Crestor [Rosuvastatin Calcium] Myalgia   • Adhesive Tape Rash    • Latex Rash         Past Medical History:   Diagnosis Date   • Allergic rhinitis    • Angina pectoris (CMS/HCC)    • Chronic seasonal allergic rhinitis due to pollen    • Constipation    • Depressive disorder    • Diverticulitis of sigmoid colon    • Dizziness    • Eustachian tube disorder    • Jw hematuria    • Generalized anxiety disorder    • GERD (gastroesophageal reflux disease)    • Hyperlipidemia    • Malaise and fatigue    • Menopause    • Mild diastolic dysfunction    • Mitral valve regurgitation-moderate    • Osteopenia    • Palpitations    • Paroxysmal supraventricular tachycardia (CMS/HCC)    • Prolapse of vaginal vault after hysterectomy    • Rosacea    • SVT (supraventricular tachycardia) - S/P ablation 2015    • Syncope          Past Surgical History:   Procedure Laterality Date   • CARDIAC ABLATION  02/2015    SVT pathway ablation, Dr. Aguirre   • CARDIAC CATHETERIZATION  11/12/2014    Normal coronaries. Normal LV systolic function   • COLPOPEXY VAGINAL  01/28/2013    laparoscopic uterisacral colpopexy (intraperitoneal) Lysis of adhesions (took 1.5 hrs of 2.5 hrs spent laparoscopically ) Tension free vaginal tape midurethral sling Posterior colporrhaphy. Perineorrhaphy. Cystourethroscopy   • ENDOSCOPY AND COLONOSCOPY  04/2014    No polyps. Minimal diverticulosis (sigmoid). Dr Louise   • HEMORRHOIDECTOMY  2005   • HYSTERECTOMY     • OOPHORECTOMY           Family History   Problem Relation Age of Onset   • Lung cancer Father    • Heart disease Other          Social History     Socioeconomic History   • Marital status:      Spouse name: Not on file   • Number of children: Not on file   • Years of education: Not on file   • Highest education level: Not on file   Tobacco Use   • Smoking status: Never Smoker   • Smokeless tobacco: Never Used   Substance and Sexual Activity   • Alcohol use: No   • Drug use: No   • Sexual activity: Defer         Current Outpatient Medications   Medication Sig  Dispense Refill   • Calcium Carbonate-Vit D-Min (CALTRATE 600+D PLUS PO) Take 1 tablet by mouth 2 (Two) Times a Day.     • CARTIA  MG 24 hr capsule TAKE 1 CAPSULE BY MOUTH ONCE DAILY 30 capsule 1   • fluticasone (FLONASE) 50 MCG/ACT nasal spray 1 spray by Each Nare route 2 (Two) Times a Day.     • guaiFENesin (MUCINEX) 600 MG 12 hr tablet Take 1 tablet by mouth 2 (Two) Times a Day. 14 tablet 0   • meloxicam (MOBIC) 15 MG tablet Take 1 tablet by mouth Daily As Needed (pain). Take with food 30 tablet 0   • nitrofurantoin, macrocrystal-monohydrate, (MACROBID) 100 MG capsule Take 1 capsule by mouth 2 (Two) Times a Day. 14 capsule 0   • omeprazole (priLOSEC) 40 MG capsule TAKE ONE CAPSULE BY MOUTH ONCE DAILY IN THE MORNING FOR REFLUX 30 capsule 11   • PARoxetine (PAXIL) 20 MG tablet TAKE ONE TABLET BY MOUTH ONCE DAILY AT BEDTIME 30 tablet 11   • phenazopyridine (PYRIDIUM) 100 MG tablet Take 1 tablet by mouth 3 (Three) Times a Day. 6 tablet 0   • tiZANidine (ZANAFLEX) 4 MG tablet 1/2-1 qhs prn muscle spasms 30 tablet 0   • triamcinolone (KENALOG) 0.1 % cream Apply  topically to the appropriate area as directed 3 (Three) Times a Day. For itching 80 g 0   • triamcinolone (KENALOG) 0.1 % lotion Apply  topically to the appropriate area as directed 3 (Three) Times a Day. 120 mL 2     No current facility-administered medications for this visit.            Review of Systems:     Constitutional:  Denies recent weight loss, weight gain, fever or chills, no change in exercise tolerance.     HENT:  Denies any hearing loss, epistaxis, hoarseness, or difficulty speaking.     Eyes: No blurred  Respiratory:  Denies dyspnea with exertion,no cough, wheezing, or hemoptysis.     Cardiovascular: See H&P    Gastrointestinal:  Denies change in bowel habits, dyspepsia, ulcer disease, hematochezia, or melena.     Endocrine: Negative for cold intolerance, heat intolerance, polydipsia, polyphagia and polyuria. Denies any history of weight  "change, polydipsia, polyuria.     Genitourinary: Negative.      Musculoskeletal: Denies any history of arthritic symptoms or back problems.     Skin:  Denies any change in hair or nails, rashes, or skin lesions.     Allergic/Immunologic: Negative.  Negative for environmental allergies, food allergies and immunocompromised state.     Neurological:  Denies any history of recurrent headaches, strokes, TIA, or seizure disorder.     Hematological: Denies any food allergies, seasonal allergies, bleeding disorders, or lymphadenopathy.     Psychiatric/Behavioral: Denies any history of depression, substance abuse, or change in cognitive function.       OBJECTIVE:    Ht 162.6 cm (64\")   BMI 21.97 kg/m²       Physical Exam:     Constitutional: Cooperative, alert and oriented, well-developed, well-nourished, in no acute distress.     HENT:   Head: Normocephalic, normal hair patterns, no masses or tenderness.  Ears, Nose, and Throat: No gross abnormalities. No pallor or cyanosis. Dentition good.   Eyes: EOMS intact, PERRL, conjunctivae and lids unremarkable. Fundoscopic exam and visual fields not performed.   Neck: No palpable masses or adenopathy, no thyromegaly, no JVD, carotid pulses are full and equal bilaterally and without  Bruits.     Cardiovascular: Regular rhythm, S1 and S2 normal, no S3 or S4. Apical impulse not displaced. No murmurs, gallops, or rubs detected.     Pulmonary/Chest: Chest: normal symmetry, no tenderness to palpation, normal respiratory excursion, no intercostal retraction, no use of accessory muscles. Pulmonary: Normal breath sounds. No rales or rhonchi.    Abdominal: Abdomen soft, bowel sounds normoactive, no masses, no hepatosplenomegaly, non-tender, no bruits.     Musculoskeletal: No deformities, clubbing, cyanosis, erythema, or edema observed. There are no spinal abnormalities noted. Normal muscle strength and tone. Pulses full and equal in all extremities, no bruits auscultated.     Neurological: " No gross motor or sensory deficits noted, affect appropriate, oriented to time, person, place.     Skin: Warm and dry to the touch, no apparent skin lesions or masses noted.     Psychiatric: She has a normal mood and affect. Her behavior is normal. Judgment and thought content normal.         Procedures      Lab Results   Component Value Date    WBC 6.71 03/18/2019    HGB 12.8 03/18/2019    HCT 39.1 03/18/2019    MCV 95.6 03/18/2019     03/18/2019     Lab Results   Component Value Date    GLUCOSE 107 (H) 03/18/2019    BUN 11 03/18/2019    CREATININE 0.72 03/18/2019    EGFRIFNONA 80 03/18/2019    BCR 15.3 03/18/2019    CO2 27.0 03/18/2019    CALCIUM 9.5 03/18/2019    ALBUMIN 4.10 03/18/2019    AST 19 03/18/2019    ALT 15 03/18/2019     Lab Results   Component Value Date    CHOL 253 (H) 03/18/2019    CHOL 268 (H) 03/15/2018    CHOL 235 (H) 03/15/2017     Lab Results   Component Value Date    TRIG 118 03/18/2019    TRIG 103 03/15/2018    TRIG 118 03/15/2017     Lab Results   Component Value Date     (H) 03/18/2019     (H) 03/15/2018    HDL 95 03/15/2017     No components found for: LDLCALC  Lab Results   Component Value Date     (H) 03/18/2019     03/15/2018     03/15/2017     No results found for: HDLLDLRATIO  No components found for: CHOLHDL  No results found for: HGBA1C  Lab Results   Component Value Date    TSH 3.420 03/15/2018           ASSESSMENT AND PLAN:  #1 supraventricular tachycardia status post EP study and ablation.  #2 moderate mitral, mild aortic and mild tricuspid regurgitations.  #3 history of chest pain atypical with a normal cardia catheterizations     Clinically there is no sign of any acute cardiac decompensation based on the clinical history physical finding.  No Evidence of active ischemia.  We'll see her back in 6 months R depends on patient clinical conditions.  She will continue omeprazole with history of gastritis  And lovastatin for management of  hyperlipidemia.  Given the nonsustained palpitation with a different mechanism with activity she agreed to have 24 Holter monitor.  Given the moderate mitral mild mitral and aortic regurgitation arrange an echocardiogram study in 6-month.  Risk factor lifestyle modification discussed.        Supriya was seen today for follow-up.    Diagnoses and all orders for this visit:    SVT (supraventricular tachycardia) - S/P ablation 2015    Mixed hyperlipidemia    Non-rheumatic mitral regurgitation    Nonrheumatic aortic valve insufficiency    Mild diastolic dysfunction        Natalia Aguirre MD  4/24/2019  9:37 AM

## 2019-05-03 ENCOUNTER — DOCUMENTATION (OUTPATIENT)
Dept: CARDIOLOGY | Facility: CLINIC | Age: 74
End: 2019-05-03

## 2019-05-03 NOTE — PROGRESS NOTES
Holter ok per Dr. Aguirre.  A little slow at night per Dr. Aguirre but nothing of concern.  Patient notified.

## 2019-05-16 RX ORDER — DILTIAZEM HYDROCHLORIDE 120 MG/1
CAPSULE, EXTENDED RELEASE ORAL
Qty: 90 CAPSULE | Refills: 1 | Status: SHIPPED | OUTPATIENT
Start: 2019-05-16 | End: 2019-11-12 | Stop reason: SDUPTHER

## 2019-06-26 RX ORDER — OMEPRAZOLE 40 MG/1
CAPSULE, DELAYED RELEASE ORAL
Qty: 30 CAPSULE | Refills: 11 | Status: SHIPPED | OUTPATIENT
Start: 2019-06-26 | End: 2020-06-22

## 2019-06-26 RX ORDER — PAROXETINE HYDROCHLORIDE 20 MG/1
TABLET, FILM COATED ORAL
Qty: 30 TABLET | Refills: 11 | Status: SHIPPED | OUTPATIENT
Start: 2019-06-26 | End: 2020-06-22

## 2019-07-22 ENCOUNTER — OFFICE VISIT (OUTPATIENT)
Dept: FAMILY MEDICINE CLINIC | Facility: CLINIC | Age: 74
End: 2019-07-22

## 2019-07-22 VITALS
HEART RATE: 72 BPM | SYSTOLIC BLOOD PRESSURE: 138 MMHG | DIASTOLIC BLOOD PRESSURE: 68 MMHG | HEIGHT: 64 IN | WEIGHT: 132 LBS | TEMPERATURE: 98 F | BODY MASS INDEX: 22.53 KG/M2

## 2019-07-22 DIAGNOSIS — M47.22 OSTEOARTHRITIS OF SPINE WITH RADICULOPATHY, CERVICAL REGION: ICD-10-CM

## 2019-07-22 DIAGNOSIS — J01.00 ACUTE NON-RECURRENT MAXILLARY SINUSITIS: ICD-10-CM

## 2019-07-22 DIAGNOSIS — M62.838 MUSCLE SPASMS OF NECK: ICD-10-CM

## 2019-07-22 DIAGNOSIS — S16.1XXA CERVICAL STRAIN, ACUTE, INITIAL ENCOUNTER: Primary | ICD-10-CM

## 2019-07-22 PROCEDURE — 99213 OFFICE O/P EST LOW 20 MIN: CPT | Performed by: INTERNAL MEDICINE

## 2019-07-22 PROCEDURE — 96372 THER/PROPH/DIAG INJ SC/IM: CPT | Performed by: INTERNAL MEDICINE

## 2019-07-22 RX ORDER — TRIAMCINOLONE ACETONIDE 40 MG/ML
20 INJECTION, SUSPENSION INTRA-ARTICULAR; INTRAMUSCULAR ONCE
Status: COMPLETED | OUTPATIENT
Start: 2019-07-22 | End: 2019-07-22

## 2019-07-22 RX ORDER — TIZANIDINE 4 MG/1
TABLET ORAL
Qty: 30 TABLET | Refills: 0 | Status: SHIPPED | OUTPATIENT
Start: 2019-07-22 | End: 2020-07-14

## 2019-07-22 RX ORDER — MELOXICAM 15 MG/1
15 TABLET ORAL DAILY PRN
Qty: 30 TABLET | Refills: 0 | Status: SHIPPED | OUTPATIENT
Start: 2019-07-22 | End: 2021-07-19

## 2019-07-22 RX ORDER — METHYLPREDNISOLONE ACETATE 80 MG/ML
80 INJECTION, SUSPENSION INTRA-ARTICULAR; INTRALESIONAL; INTRAMUSCULAR; SOFT TISSUE ONCE
Status: COMPLETED | OUTPATIENT
Start: 2019-07-22 | End: 2019-07-22

## 2019-07-22 RX ADMIN — TRIAMCINOLONE ACETONIDE 20 MG: 40 INJECTION, SUSPENSION INTRA-ARTICULAR; INTRAMUSCULAR at 11:05

## 2019-07-22 RX ADMIN — METHYLPREDNISOLONE ACETATE 80 MG: 80 INJECTION, SUSPENSION INTRA-ARTICULAR; INTRALESIONAL; INTRAMUSCULAR; SOFT TISSUE at 11:04

## 2019-07-22 NOTE — PROGRESS NOTES
"Subjective        History of Present Illness     Supriya Alberto is a 74 y.o. female who comes in today reporting 3-4 week history of worsening cervical neck pain with radiculopathy down into the left arm and left hand as well as increased muscle spasm.  She has noticed dropping items maybe a little bit more in the past few weeks, although patient has normal  strength on exam today.   Denies injury or trauma.  The pain has disrupted her sleep, as she finds it hard to get in a comfortable position due to the muscle spasm.  She has tried p.r.n. use of Mobic and Zanaflex without improvement.  She has experienced excessive daytime sedation on days after taking the Zanaflex.  Cervical x-rays 10/2018 revealed mild diffuse facet arthropathy with no acute changes.  I demonstrated some deep tendon massage to help relieve the muscle spasm.  I offered      Review of Systems   Constitutional: Negative for chills, fatigue and fever.   HENT: Negative for congestion, ear pain, postnasal drip, sinus pressure and sore throat.    Respiratory: Negative for cough, shortness of breath and wheezing.    Cardiovascular: Negative for chest pain, palpitations and leg swelling.   Gastrointestinal: Negative for abdominal pain, blood in stool, constipation, diarrhea, nausea and vomiting.   Endocrine: Negative for cold intolerance, heat intolerance, polydipsia and polyuria.   Genitourinary: Negative for dysuria, frequency, hematuria and urgency.   Musculoskeletal:        Cervical neck pain and spasm.    Skin: Negative for rash.   Neurological: Negative for syncope and weakness.        Objective     Vitals:    07/22/19 1032   BP: 138/68   Pulse: 72   Temp: 98 °F (36.7 °C)   TempSrc: Oral   Weight: 59.9 kg (132 lb)   Height: 162.6 cm (64\")     Physical Exam   Constitutional: She is oriented to person, place, and time. She appears well-developed and well-nourished. No distress.   Accompanied by her .    HENT:   Head: Normocephalic and " atraumatic.   Nose: Nose normal.   Mouth/Throat: Oropharynx is clear and moist. No oropharyngeal exudate.   Eyes: EOM are normal. Pupils are equal, round, and reactive to light.   Neck: Neck supple. No JVD present. No thyromegaly present.   Cardiovascular: Normal rate, regular rhythm and normal heart sounds.   Pulmonary/Chest: Effort normal and breath sounds normal. No accessory muscle usage. No respiratory distress. She has no wheezes. She has no rales.   Abdominal: Soft. Bowel sounds are normal. She exhibits no distension. There is no tenderness.   Musculoskeletal: She exhibits no edema.   Impressive cervical muscle spasm.  Exam of upper extremities reveal normal  strength.  Normal sensation to upper extremities.    Lymphadenopathy:     She has no cervical adenopathy.   Neurological: She is alert and oriented to person, place, and time. No cranial nerve deficit.        Psychiatric: She has a normal mood and affect. Her speech is normal and behavior is normal. Judgment and thought content normal.     Future Appointments   Date Time Provider Department Center   10/25/2019 10:00 AM [facility] ECHO ROOM 2 [facility] CARD [place]   10/25/2019 11:30 AM Natalia Aguirre MD [facility] None   3/27/2020  9:00 AM Feliz Valiente MD [facility] POW None     Assessment/Plan      For the cervical strain, I demonstrated deep tendon massage technique to repeat several times daily to help relieve the cervical muscle spasm.  After informed verbal consent, patient is given Kenalog 20 mg and Depo-Medrol 80 mg IM without difficulty or complications.  Patient tolerated well without complications.  Continue the Mobic 15 mg increasing to one q.d. with food.   Continue the Zanaflex.  I recommended she try taking 1/2 tablet around supper and the other 1/2 at bedtime to help with the excessive sedation the following morning.  I offered referral to physical therapy, although, she would like to hold off on this for now.  Notify me if no improvement  over the next two weeks and we can discuss physical therapy further and proceed with additional imaging.       Return next March for routine follow up with fasting labs one week prior or sooner if needed.     Scribed for Dr. Valiente by Samina Field [facility].     Diagnoses and all orders for this visit:    Cervical strain, acute, initial encounter  -     methylPREDNISolone acetate (DEPO-medrol) injection 80 mg  -     triamcinolone acetonide (KENALOG-40) injection 20 mg    Osteoarthritis of spine with radiculopathy, cervical region    Muscle spasms of neck    Other orders  -     meloxicam (MOBIC) 15 MG tablet; Take 1 tablet by mouth Daily As Needed (pain). Take with food  -     tiZANidine (ZANAFLEX) 4 MG tablet; 1/2-1 qhs prn muscle spasms        No visits with results within 3 Week(s) from this visit.   Latest known visit with results is:   Admission on 04/03/2019, Discharged on 04/03/2019   Component Date Value Ref Range Status   • Color 04/03/2019 Yellow  Yellow, Straw, Dark Yellow, Marleny Final   • Clarity, UA 04/03/2019 Clear  Clear Final   • Glucose, UA 04/03/2019 Negative  Negative, 1000 mg/dL (3+) mg/dL Final   • Bilirubin 04/03/2019 Negative  Negative Final   • Ketones, UA 04/03/2019 Negative  Negative Final   • Specific Gravity  04/03/2019 1.010  1.005 - 1.030 Final   • Blood, UA 04/03/2019 Moderate* Negative Final   • pH, Urine 04/03/2019 7.5  5.0 - 8.0 Final   • Protein, POC 04/03/2019 Negative  Negative mg/dL Final   • Urobilinogen, UA 04/03/2019 Normal  Normal Final   • Nitrite, UA 04/03/2019 Negative  Negative Final   • Leukocytes 04/03/2019 Moderate (2+)* Negative Final   ]

## 2019-07-23 RX ORDER — FLUTICASONE PROPIONATE 50 MCG
SPRAY, SUSPENSION (ML) NASAL
Qty: 1 BOTTLE | Refills: 3 | Status: SHIPPED | OUTPATIENT
Start: 2019-07-23 | End: 2020-11-09

## 2019-07-31 ENCOUNTER — TELEPHONE (OUTPATIENT)
Dept: FAMILY MEDICINE CLINIC | Facility: CLINIC | Age: 74
End: 2019-07-31

## 2019-07-31 DIAGNOSIS — S16.1XXA CERVICAL STRAIN, ACUTE, INITIAL ENCOUNTER: Primary | ICD-10-CM

## 2019-07-31 NOTE — TELEPHONE ENCOUNTER
07/31/2019 referred to AJIT Lewis. TP        ----- Message from Kathi Crenshaw sent at 7/31/2019 10:28 AM CDT -----  Patient had already seen  About therapy, refused but has now changed her mind to now to wanting therapy now. Please set up for her

## 2019-10-22 ENCOUNTER — CLINICAL SUPPORT (OUTPATIENT)
Dept: FAMILY MEDICINE CLINIC | Facility: CLINIC | Age: 74
End: 2019-10-22

## 2019-10-22 DIAGNOSIS — Z23 FLU VACCINE NEED: Primary | ICD-10-CM

## 2019-10-22 PROCEDURE — G0008 ADMIN INFLUENZA VIRUS VAC: HCPCS | Performed by: INTERNAL MEDICINE

## 2019-10-22 PROCEDURE — 90653 IIV ADJUVANT VACCINE IM: CPT | Performed by: INTERNAL MEDICINE

## 2019-10-25 ENCOUNTER — OFFICE VISIT (OUTPATIENT)
Dept: CARDIOLOGY | Facility: CLINIC | Age: 74
End: 2019-10-25

## 2019-10-25 VITALS
BODY MASS INDEX: 21.85 KG/M2 | HEART RATE: 74 BPM | SYSTOLIC BLOOD PRESSURE: 118 MMHG | HEIGHT: 64 IN | WEIGHT: 128 LBS | DIASTOLIC BLOOD PRESSURE: 66 MMHG | OXYGEN SATURATION: 99 %

## 2019-10-25 DIAGNOSIS — I20.9 ANGINA PECTORIS (HCC): ICD-10-CM

## 2019-10-25 DIAGNOSIS — I51.9 MILD DIASTOLIC DYSFUNCTION: ICD-10-CM

## 2019-10-25 DIAGNOSIS — I47.1 SVT (SUPRAVENTRICULAR TACHYCARDIA) (HCC): Primary | Chronic | ICD-10-CM

## 2019-10-25 DIAGNOSIS — I35.1 NONRHEUMATIC AORTIC VALVE INSUFFICIENCY: ICD-10-CM

## 2019-10-25 DIAGNOSIS — E78.2 MIXED HYPERLIPIDEMIA: Chronic | ICD-10-CM

## 2019-10-25 PROCEDURE — 99214 OFFICE O/P EST MOD 30 MIN: CPT | Performed by: INTERNAL MEDICINE

## 2019-10-25 NOTE — PROGRESS NOTES
Supriya Alberto  74 y.o. female    10/25/2019  1. SVT (supraventricular tachycardia) - S/P ablation 2015    2. Mixed hyperlipidemia    3. Mild diastolic dysfunction    4. Nonrheumatic aortic valve insufficiency    5. Angina pectoris (CMS/HCC)        History of Present Illness:      73 years old patient  underwent cardiac catheterization noted to have normal coronaries with normal left systolic function.  Patient has symptomatic supraventricular tachycardia underwent EP study and successful slow pathway ablation.  Patient complains nonsustained palpitation with activity mechanism and undefined..  Normal left systolic function with a mild  aortic and tricuspid regurgitation.  There is a moderate mitral regurgitation.  Repeat echo unchanged from the previous and finding discussed with the patient.  The patient has history of gastroesophageal reflux disease with a previous normal cardiac catheterization ptient denies orthopnea, PND, nauseous, vomiting, diarrhea.  Denies intermittent claudication.  Denies any palpitation or fluttering.  Denies any syncope or near syncopal episode.     Echo 10/25/2019  · Estimated EF = 61%.  · Left ventricular systolic function is normal.  · Left ventricular diastolic dysfunction (grade I) consistent with impaired relaxation.  · Mild aortic valve regurgitation is present.  · Moderate mitral valve regurgitation is present  · Mild tricuspid valve regurgitation is present.      3/2019     Total Cholesterol 150 - 200 mg/dL 253 Abnormally high     Triglycerides <=150 mg/dL 118    HDL Cholesterol 40 - 59 mg/dL 121 Abnormally high     LDL Cholesterol  <=100 mg/dL 108 Abnormally high     VLDL Cholesterol mg/dL 23.6    LDL/HDL Ratio 0.00 - 3.22 0.90             3/2018  Total Cholesterol 150 - 200 mg/dL 268     Triglycerides 35 - 160 mg/dL 103    HDL Cholesterol 35 - 100 mg/dL 114     LDL Cholesterol  mg/dL 133    VLDL Cholesterol mg/dL 20.6    LDL/HDL Ratio   1.17         ECHO 4/17/2017  · Left  ventricular function is normal. Estimated EF = 60%.  · Left ventricular diastolic dysfunction (grade I) consistent with impaired relaxation.  · Mild aortic valve regurgitation is present.  · Moderate mitral valve regurgitation is present  · Mild tricuspid valve regurgitation is present        SUBJECTIVE:    Allergies   Allergen Reactions   • Crestor [Rosuvastatin Calcium] Myalgia   • Adhesive Tape Rash   • Latex Rash         Past Medical History:   Diagnosis Date   • Allergic rhinitis    • Angina pectoris (CMS/HCC)    • Chronic seasonal allergic rhinitis due to pollen    • Constipation    • Depressive disorder    • Diverticulitis of sigmoid colon    • Dizziness    • Eustachian tube disorder    • Jw hematuria    • Generalized anxiety disorder    • GERD (gastroesophageal reflux disease)    • Hyperlipidemia    • Malaise and fatigue    • Menopause    • Mild diastolic dysfunction    • Mitral valve regurgitation-moderate    • Osteopenia    • Palpitations    • Paroxysmal supraventricular tachycardia (CMS/HCC)    • Prolapse of vaginal vault after hysterectomy    • Rosacea    • SVT (supraventricular tachycardia) - S/P ablation 2015    • Syncope          Past Surgical History:   Procedure Laterality Date   • CARDIAC ABLATION  02/2015    SVT pathway ablation, Dr. Aguirre   • CARDIAC CATHETERIZATION  11/12/2014    Normal coronaries. Normal LV systolic function   • COLPOPEXY VAGINAL  01/28/2013    laparoscopic uterisacral colpopexy (intraperitoneal) Lysis of adhesions (took 1.5 hrs of 2.5 hrs spent laparoscopically ) Tension free vaginal tape midurethral sling Posterior colporrhaphy. Perineorrhaphy. Cystourethroscopy   • ENDOSCOPY AND COLONOSCOPY  04/2014    No polyps. Minimal diverticulosis (sigmoid). Dr Louise   • HEMORRHOIDECTOMY  2005   • HYSTERECTOMY     • OOPHORECTOMY           Family History   Problem Relation Age of Onset   • Lung cancer Father    • Heart disease Other          Social History     Socioeconomic History    • Marital status:      Spouse name: Not on file   • Number of children: Not on file   • Years of education: Not on file   • Highest education level: Not on file   Tobacco Use   • Smoking status: Never Smoker   • Smokeless tobacco: Never Used   Substance and Sexual Activity   • Alcohol use: No   • Drug use: No   • Sexual activity: Defer         Current Outpatient Medications   Medication Sig Dispense Refill   • Calcium Carbonate-Vit D-Min (CALTRATE 600+D PLUS PO) Take 1 tablet by mouth 2 (Two) Times a Day.     • CARTIA  MG 24 hr capsule TAKE 1 CAPSULE BY MOUTH ONCE DAILY 90 capsule 1   • CLOBETASOL PROPIONATE EMULSION EX Apply  topically.     • fluticasone (FLONASE) 50 MCG/ACT nasal spray USE 2 SPRAY(S) IN EACH NOSTRIL ONCE DAILY 1 bottle 3   • guaiFENesin (MUCINEX) 600 MG 12 hr tablet Take 1 tablet by mouth 2 (Two) Times a Day. 14 tablet 0   • ketoconazole (NIZORAL) 2 % shampoo Apply  topically to the appropriate area as directed 2 (Two) Times a Week.     • meloxicam (MOBIC) 15 MG tablet Take 1 tablet by mouth Daily As Needed (pain). Take with food 30 tablet 0   • omeprazole (priLOSEC) 40 MG capsule TAKE 1 CAPSULE BY MOUTH ONCE DAILY IN THE MORNING FOR REFLUX 30 capsule 11   • PARoxetine (PAXIL) 20 MG tablet TAKE 1 TABLET BY MOUTH ONCE DAILY AT BEDTIME 30 tablet 11   • tiZANidine (ZANAFLEX) 4 MG tablet 1/2-1 qhs prn muscle spasms 30 tablet 0   • triamcinolone (KENALOG) 0.1 % cream Apply  topically to the appropriate area as directed 3 (Three) Times a Day. For itching 80 g 0   • triamcinolone (KENALOG) 0.1 % lotion Apply  topically to the appropriate area as directed 3 (Three) Times a Day. 120 mL 2     No current facility-administered medications for this visit.            Review of Systems:     Constitutional:  Denies recent weight loss, weight gain, fever or chills, no change in exercise tolerance.     HENT:  Denies any hearing loss, epistaxis, hoarseness, or difficulty speaking.     Eyes: Wears  "eyeglasses or contact lenses.    Respiratory:  Denies dyspnea with exertion,no cough, wheezing, or hemoptysis.     Cardiovascular: See H&P    Gastrointestinal: Gastroesophageal reflux disease     Endocrine: Negative for cold intolerance, heat intolerance, polydipsia, polyphagia and polyuria. Denies any history of weight change, polydipsia, polyuria.     Genitourinary: Negative.      Musculoskeletal: Denies any history of arthritic symptoms or back problems.     Skin:  Denies any change in hair or nails, rashes, or skin lesions.     Allergic/Immunologic: Negative.  Negative for environmental allergies, food allergies and immunocompromised state.     Neurological:  Denies any history of recurrent headaches, strokes, TIA, or seizure disorder.     Hematological: Denies any food allergies, seasonal allergies, bleeding disorders, or lymphadenopathy.     Psychiatric/Behavioral: Denies any history of depression, substance abuse, or change in cognitive function.       OBJECTIVE:    /66   Pulse 74   Ht 162.6 cm (64\")   Wt 58.1 kg (128 lb)   SpO2 99%   BMI 21.97 kg/m²       Physical Exam:     Constitutional: Cooperative, alert and oriented, well-developed, well-nourished, in no acute distress.     HENT:   Head: Normocephalic, normal hair patterns, no masses or tenderness.  Ears, Nose, and Throat: No gross abnormalities. No pallor or cyanosis. Dentition good.   Eyes: EOMS intact, PERRL, conjunctivae and lids unremarkable. Fundoscopic exam and visual fields not performed.   Neck: No palpable masses or adenopathy, no thyromegaly, no JVD, carotid pulses are full and equal bilaterally and without  Bruits.     Cardiovascular: Regular rhythm, S1 and S2 normal, no S3 or S4. Apical impulse not displaced. No murmurs, gallops, or rubs detected.     Pulmonary/Chest: Chest: normal symmetry, no tenderness to palpation, normal respiratory excursion, no intercostal retraction, no use of accessory muscles. Pulmonary: Normal breath " sounds. No rales or rhonchi.    Abdominal: Abdomen soft, bowel sounds normoactive, no masses, no hepatosplenomegaly, non-tender, no bruits.     Musculoskeletal: No deformities, clubbing, cyanosis, erythema, or edema observed. There are no spinal abnormalities noted. Normal muscle strength and tone. Pulses full and equal in all extremities, no bruits auscultated.     Neurological: No gross motor or sensory deficits noted, affect appropriate, oriented to time, person, place.     Skin: Warm and dry to the touch, no apparent skin lesions or masses noted.     Psychiatric: She has a normal mood and affect. Her behavior is normal. Judgment and thought content normal.         Procedures      Lab Results   Component Value Date    WBC 6.71 03/18/2019    HGB 12.8 03/18/2019    HCT 39.1 03/18/2019    MCV 95.6 03/18/2019     03/18/2019     Lab Results   Component Value Date    GLUCOSE 107 (H) 03/18/2019    BUN 11 03/18/2019    CREATININE 0.72 03/18/2019    EGFRIFNONA 80 03/18/2019    BCR 15.3 03/18/2019    CO2 27.0 03/18/2019    CALCIUM 9.5 03/18/2019    ALBUMIN 4.10 03/18/2019    AST 19 03/18/2019    ALT 15 03/18/2019     Lab Results   Component Value Date    CHOL 253 (H) 03/18/2019    CHOL 268 (H) 03/15/2018    CHOL 235 (H) 03/15/2017     Lab Results   Component Value Date    TRIG 118 03/18/2019    TRIG 103 03/15/2018    TRIG 118 03/15/2017     Lab Results   Component Value Date     (H) 03/18/2019     (H) 03/15/2018    HDL 95 03/15/2017     No components found for: LDLCALC  Lab Results   Component Value Date     (H) 03/18/2019     03/15/2018     03/15/2017     No results found for: HDLLDLRATIO  No components found for: CHOLHDL  No results found for: HGBA1C  Lab Results   Component Value Date    TSH 3.420 03/15/2018           ASSESSMENT AND PLAN:    #1 supraventricular tachycardia status post EP study and ablation   #2 moderate mitral, mild aortic and mild tricuspid regurgitations.     #3 history of chest pain atypical with a normal cardia catheterizations     Clinically there is no sign of any acute cardiac decompensation based on the clinical history physical finding.  No Evidence of active ischemia.  We'll see her back in 12 months R depends on patient clinical conditions.  She will continue omeprazole with history of gastritis  And lovastatin for management of hyperlipidemia.  The patient will continue present medication given asymptomatic status and compensated cardiac condition no change was made in the medical management  Supriya was seen today for follow-up.    Diagnoses and all orders for this visit:    SVT (supraventricular tachycardia) - S/P ablation 2015    Mixed hyperlipidemia    Mild diastolic dysfunction    Nonrheumatic aortic valve insufficiency    Angina pectoris (CMS/HCC)          Natalia Aguirre MD  10/25/2019  12:19 PM

## 2019-10-28 ENCOUNTER — OFFICE VISIT (OUTPATIENT)
Dept: FAMILY MEDICINE CLINIC | Facility: CLINIC | Age: 74
End: 2019-10-28

## 2019-10-28 VITALS
SYSTOLIC BLOOD PRESSURE: 118 MMHG | OXYGEN SATURATION: 98 % | TEMPERATURE: 98.6 F | HEIGHT: 64 IN | DIASTOLIC BLOOD PRESSURE: 78 MMHG | BODY MASS INDEX: 22.2 KG/M2 | WEIGHT: 130 LBS | HEART RATE: 68 BPM

## 2019-10-28 DIAGNOSIS — L84 FOOT CALLUS: Primary | ICD-10-CM

## 2019-10-28 DIAGNOSIS — I47.1 SVT (SUPRAVENTRICULAR TACHYCARDIA) (HCC): Chronic | ICD-10-CM

## 2019-10-28 DIAGNOSIS — L84 CORN OF TOE: ICD-10-CM

## 2019-10-28 DIAGNOSIS — M79.10 MYALGIA: ICD-10-CM

## 2019-10-28 PROCEDURE — 11056 PARNG/CUTG B9 HYPRKR LES 2-4: CPT | Performed by: INTERNAL MEDICINE

## 2019-10-28 PROCEDURE — 99213 OFFICE O/P EST LOW 20 MIN: CPT | Performed by: INTERNAL MEDICINE

## 2019-10-28 NOTE — PROGRESS NOTES
"Subjective        History of Present Illness     Supriya Alberto is a 74 y.o. female who comes in reporting a \"needle-like pain\" in the fourth and fifth toes of the right foot. Exam of the foot reveals corn/callous on medial aspect of fifth toe and lateral aspect of the fourth toe.  She has gotten some relief with applying iodine and staying off of the feet more the past two days.  I trimmed the callous and reviewed appropriate foot care as noted below.  She wears several shoes that have a narrow toe box.  She has a neighbor who has seen Dr. Mejia for foot care in the past.  We can refer if no improvement.     She has had some mild body aches since having her influenza vaccine six days ago.  She is given reassurance.       Blood pressure and heart rate at goal.  With the diltiazem XL, which we are using to address her history of SVT..    Review of Systems   Constitutional: Negative for chills, fatigue and fever.   HENT: Negative for congestion, ear pain, postnasal drip, sinus pressure and sore throat.    Respiratory: Negative for cough, shortness of breath and wheezing.    Cardiovascular: Negative for chest pain, palpitations and leg swelling.   Gastrointestinal: Negative for abdominal pain, blood in stool, constipation, diarrhea, nausea and vomiting.   Endocrine: Negative for cold intolerance, heat intolerance, polydipsia and polyuria.   Genitourinary: Negative for dysuria, frequency, hematuria and urgency.   Skin: Negative for rash.   Neurological: Negative for syncope and weakness.        Objective     Visit Vitals  /78   Pulse 68   Temp 98.6 °F (37 °C) (Oral)   Ht 162.6 cm (64\")   Wt 59 kg (130 lb)   SpO2 98%   BMI 22.31 kg/m²     Physical Exam   Constitutional: She is oriented to person, place, and time. She appears well-developed and well-nourished. No distress.   HENT:   Head: Normocephalic and atraumatic.   Nose: Nose normal.   Mouth/Throat: Oropharynx is clear and moist. No oropharyngeal exudate. "   Eyes: EOM are normal. Pupils are equal, round, and reactive to light.   Neck: Neck supple. No JVD present. No thyromegaly present.   Cardiovascular: Normal rate, regular rhythm and normal heart sounds.   Pulmonary/Chest: Effort normal and breath sounds normal. No accessory muscle usage. No respiratory distress. She has no wheezes. She has no rales.   Abdominal: Soft. Bowel sounds are normal. She exhibits no distension. There is no tenderness.   Musculoskeletal: She exhibits no edema.   Lymphadenopathy:     She has no cervical adenopathy.   Neurological: She is alert and oriented to person, place, and time. No cranial nerve deficit.   Skin:   Callous on the medial aspect of fifth toe and lateral aspect of the fourth toe    Psychiatric: She has a normal mood and affect. Her speech is normal and behavior is normal. Judgment and thought content normal.     Future Appointments   Date Time Provider Department Center   3/27/2020  9:00 AM Feliz Valiente MD [facility] None   10/23/2020 10:30 AM Natalia Aguirre MD [facility] None       Assessment/Plan      Procedures  After informed verbal consent, alcohol wipe is used to clean the right fourth and fifth toes.  A scalpel is used to trim the callous on the medial aspect of fifth toe and lateral aspect of the fourth toe without difficulty or complications.  She tolerated well.  We discussed appropriate foot care to help avoid developing callouses.  I recommended wearing a shoe with a wider toe box or even slightly larger size shoe. Avoid wearing narrow shoes.  I recommended applying Profoot Gale-Gel Corn Wraps.     She is given reassurance the mild myalgias/body aches are more than likely a reaction to last week's flu vaccine and should resolve soon.  Use Tylenol products as needed.  Notify me if body aches persist.    Continue the diltiazem.    Return in March for routine follow up with fasting labs one week prior.     Scribed for Dr. Valiente by Samina Field [facility].      Diagnoses and all orders for this visit:    Foot callus    Corn of toe    SVT (supraventricular tachycardia) - S/P ablation 2015    Myalgia - due to influenza vaccine         Hospital Outpatient Visit on 10/25/2019   Component Date Value Ref Range Status   • BSA 10/25/2019 1.6  m^2 Final   • IVSd 10/25/2019 0.69  cm Final   • LVIDd 10/25/2019 3.4  cm Final   • LVIDs 10/25/2019 2.4  cm Final   • LVPWd 10/25/2019 1.1  cm Final   • IVS/LVPW 10/25/2019 0.62   Final   • FS 10/25/2019 29.9  % Final   • EDV(Teich) 10/25/2019 46.8  ml Final   • ESV(Teich) 10/25/2019 19.5  ml Final   • EF(Teich) 10/25/2019 58.2  % Final   • EDV(cubed) 10/25/2019 38.6  ml Final   • ESV(cubed) 10/25/2019 13.3  ml Final   • EF(cubed) 10/25/2019 65.5  % Final   • LV mass(C)d 10/25/2019 83.8  grams Final   • LV mass(C)dI 10/25/2019 51.1  grams/m^2 Final   • SV(Teich) 10/25/2019 27.2  ml Final   • SI(Teich) 10/25/2019 16.6  ml/m^2 Final   • SV(cubed) 10/25/2019 25.3  ml Final   • SI(cubed) 10/25/2019 15.4  ml/m^2 Final   • EPSS 10/25/2019 0.2  cm Final   • Ao root diam 10/25/2019 2.1  cm Final   • Ao root area 10/25/2019 3.5  cm^2 Final   • ACS 10/25/2019 1.6  cm Final   • LA dimension 10/25/2019 3.2  cm Final   • asc Aorta Diam 10/25/2019 2.7  cm Final   • Ao Isth Diam 10/25/2019 2.1  cm Final   • LA/Ao 10/25/2019 1.5   Final   • LVOT diam 10/25/2019 1.9  cm Final   • LVOT area 10/25/2019 2.8  cm^2 Final   • LVOT area(traced) 10/25/2019 2.8  cm^2 Final   • LVLd ap4 10/25/2019 6.2  cm Final   • EDV(MOD-sp4) 10/25/2019 27.1  ml Final   • LVLs ap4 10/25/2019 5.5  cm Final   • ESV(MOD-sp4) 10/25/2019 13.8  ml Final   • EF(MOD-sp4) 10/25/2019 49.1  % Final   • LVLd ap2 10/25/2019 6.6  cm Final   • EDV(MOD-sp2) 10/25/2019 44.9  ml Final   • LVLs ap2 10/25/2019 5.5  cm Final   • ESV(MOD-sp2) 10/25/2019 12.9  ml Final   • EF(MOD-sp2) 10/25/2019 71.3  % Final   • SV(MOD-sp4) 10/25/2019 13.3  ml Final   • SI(MOD-sp4) 10/25/2019 8.1  ml/m^2 Final   •  SV(MOD-sp2) 10/25/2019 32.0  ml Final   • SI(MOD-sp2) 10/25/2019 19.5  ml/m^2 Final   • Ao root area (BSA corrected) 10/25/2019 1.3   Final   • LV Mullins Vol (BSA corrected) 10/25/2019 16.5  ml/m^2 Final   • LV Sys Vol (BSA corrected) 10/25/2019 8.4  ml/m^2 Final   • MV E max talha 10/25/2019 147.0  cm/sec Final   • MV A max talha 10/25/2019 151.0  cm/sec Final   • MV E/A 10/25/2019 0.97   Final   • MV V2 max 10/25/2019 162.0  cm/sec Final   • MV max PG 10/25/2019 10.5  mmHg Final   • MV V2 mean 10/25/2019 78.2  cm/sec Final   • MV mean PG 10/25/2019 3.0  mmHg Final   • MV V2 VTI 10/25/2019 51.6  cm Final   • MVA(VTI) 10/25/2019 1.5  cm^2 Final   • MV P1/2t max talha 10/25/2019 160.0  cm/sec Final   • MV P1/2t 10/25/2019 77.3  msec Final   • MVA(P1/2t) 10/25/2019 2.8  cm^2 Final   • MV dec slope 10/25/2019 606.0  cm/sec^2 Final   • Ao pk talha 10/25/2019 168.0  cm/sec Final   • Ao max PG 10/25/2019 11.3  mmHg Final   • Ao max PG (full) 10/25/2019 5.1  mmHg Final   • Ao V2 mean 10/25/2019 118.0  cm/sec Final   • Ao mean PG 10/25/2019 6.0  mmHg Final   • Ao mean PG (full) 10/25/2019 3.0  mmHg Final   • Ao V2 VTI 10/25/2019 39.6  cm Final   • JOSE(I,A) 10/25/2019 2.0  cm^2 Final   • JOSE(I,D) 10/25/2019 2.0  cm^2 Final   • JOSE(V,A) 10/25/2019 2.1  cm^2 Final   • JOSE(V,D) 10/25/2019 2.1  cm^2 Final   • LV V1 max PG 10/25/2019 6.2  mmHg Final   • LV V1 mean PG 10/25/2019 3.0  mmHg Final   • LV V1 max 10/25/2019 124.0  cm/sec Final   • LV V1 mean 10/25/2019 83.0  cm/sec Final   • LV V1 VTI 10/25/2019 27.4  cm Final   • MR max talha 10/25/2019 486.0  cm/sec Final   • MR max PG 10/25/2019 94.5  mmHg Final   • SV(Ao) 10/25/2019 137.2  ml Final   • SI(Ao) 10/25/2019 83.7  ml/m^2 Final   • SV(LVOT) 10/25/2019 77.7  ml Final   • SI(LVOT) 10/25/2019 47.4  ml/m^2 Final   • PA V2 max 10/25/2019 112.0  cm/sec Final   • PA max PG 10/25/2019 5.0  mmHg Final   • PA V2 mean 10/25/2019 85.6  cm/sec Final   • PA mean PG 10/25/2019 3.0  mmHg Final   •  PA V2 VTI 10/25/2019 28.5  cm Final   • PI end-d talha 10/25/2019 81.4  cm/sec Final   • TR max talha 10/25/2019 223.0  cm/sec Final   • RVSP(TR) 10/25/2019 22.9  mmHg Final   • RAP systole 10/25/2019 3.0  mmHg Final   • MVA P1/2T LCG 10/25/2019 1.4  cm^2 Final   • BH CV ECHO ANTONELLA - BZI_BMI 10/25/2019 22.7  kilograms/m^2 Final   • BH CV ECHO ANTONELLA - BSA(HAYCOCK) 10/25/2019 1.6  m^2 Final   • BH CV ECHO ANTONELLA - BZI_METRIC_WEIGHT 10/25/2019 59.9  kg Final   • BH CV ECHO ANTONELLA - BZI_METRIC_HEIGHT 10/25/2019 162.6  cm Final   • Echo EF Estimated 10/25/2019 61  % Final   ]

## 2019-11-06 ENCOUNTER — TELEPHONE (OUTPATIENT)
Dept: CARDIOLOGY | Facility: CLINIC | Age: 74
End: 2019-11-06

## 2019-11-06 NOTE — TELEPHONE ENCOUNTER
Results given to patient            ----- Message from Samina Cox RN sent at 11/6/2019 11:53 AM CST -----  Echo ok per akram

## 2019-11-12 RX ORDER — DILTIAZEM HYDROCHLORIDE 120 MG/1
CAPSULE, EXTENDED RELEASE ORAL
Qty: 90 CAPSULE | Refills: 1 | Status: SHIPPED | OUTPATIENT
Start: 2019-11-12 | End: 2020-05-08

## 2019-12-02 ENCOUNTER — OFFICE VISIT (OUTPATIENT)
Dept: FAMILY MEDICINE CLINIC | Facility: CLINIC | Age: 74
End: 2019-12-02

## 2019-12-02 VITALS
OXYGEN SATURATION: 99 % | HEART RATE: 74 BPM | BODY MASS INDEX: 21.68 KG/M2 | DIASTOLIC BLOOD PRESSURE: 72 MMHG | SYSTOLIC BLOOD PRESSURE: 122 MMHG | WEIGHT: 127 LBS | TEMPERATURE: 98.6 F | HEIGHT: 64 IN

## 2019-12-02 DIAGNOSIS — R05.8 NOCTURNAL COUGH WITH WHEEZE: ICD-10-CM

## 2019-12-02 DIAGNOSIS — J45.21 MILD INTERMITTENT ASTHMA WITH ACUTE EXACERBATION: ICD-10-CM

## 2019-12-02 DIAGNOSIS — R06.2 NOCTURNAL COUGH WITH WHEEZE: ICD-10-CM

## 2019-12-02 DIAGNOSIS — J20.9 ACUTE BRONCHITIS, UNSPECIFIED ORGANISM: Primary | ICD-10-CM

## 2019-12-02 PROCEDURE — 96372 THER/PROPH/DIAG INJ SC/IM: CPT | Performed by: INTERNAL MEDICINE

## 2019-12-02 PROCEDURE — 99213 OFFICE O/P EST LOW 20 MIN: CPT | Performed by: INTERNAL MEDICINE

## 2019-12-02 RX ORDER — BENZONATATE 200 MG/1
200 CAPSULE ORAL 3 TIMES DAILY PRN
Qty: 30 CAPSULE | Refills: 0 | Status: SHIPPED | OUTPATIENT
Start: 2019-12-02 | End: 2020-10-02

## 2019-12-02 RX ORDER — AZITHROMYCIN 250 MG/1
TABLET, FILM COATED ORAL
Qty: 6 TABLET | Refills: 0 | Status: SHIPPED | OUTPATIENT
Start: 2019-12-02 | End: 2020-03-03

## 2019-12-02 RX ORDER — TRIAMCINOLONE ACETONIDE 40 MG/ML
20 INJECTION, SUSPENSION INTRA-ARTICULAR; INTRAMUSCULAR ONCE
Status: COMPLETED | OUTPATIENT
Start: 2019-12-02 | End: 2019-12-02

## 2019-12-02 RX ORDER — METHYLPREDNISOLONE ACETATE 80 MG/ML
80 INJECTION, SUSPENSION INTRA-ARTICULAR; INTRALESIONAL; INTRAMUSCULAR; SOFT TISSUE ONCE
Status: COMPLETED | OUTPATIENT
Start: 2019-12-02 | End: 2019-12-02

## 2019-12-02 RX ADMIN — TRIAMCINOLONE ACETONIDE 20 MG: 40 INJECTION, SUSPENSION INTRA-ARTICULAR; INTRAMUSCULAR at 13:49

## 2019-12-02 RX ADMIN — METHYLPREDNISOLONE ACETATE 80 MG: 80 INJECTION, SUSPENSION INTRA-ARTICULAR; INTRALESIONAL; INTRAMUSCULAR; SOFT TISSUE at 13:48

## 2019-12-02 NOTE — PROGRESS NOTES
"Subjective     History of Present Illness     Supriya Alberto is a 74 y.o. female who comes in reporting 1-week history of frequent cough, which is keeping her awake at night with wheezing, postnasal drip, and headache.  Cough has been productive of yellow sputum today.  She has felt feverish.  Denies nasal drainage or sore throat.  Denies sick contacts that she is aware of.  She has taken OTC Mucinex and Delsym cough syrup with partial improvement.  She tried a Breo sample inhaler in the past, but does not routinely use an inhaler.           Review of Systems   Constitutional: Positive for fever. Negative for chills and fatigue.   HENT: Positive for congestion, postnasal drip and sinus pressure. Negative for ear pain and sore throat.    Respiratory: Positive for cough and wheezing. Negative for shortness of breath.    Cardiovascular: Negative for chest pain, palpitations and leg swelling.   Gastrointestinal: Negative for abdominal pain, blood in stool, constipation, diarrhea, nausea and vomiting.   Endocrine: Negative for cold intolerance, heat intolerance, polydipsia and polyuria.   Genitourinary: Negative for dysuria, frequency, hematuria and urgency.   Skin: Negative for rash.   Neurological: Positive for headaches. Negative for syncope and weakness.        Objective     Visit Vitals  /72   Pulse 74   Temp 98.6 °F (37 °C) (Oral)   Ht 162.6 cm (64\")   Wt 57.6 kg (127 lb)   SpO2 99%   BMI 21.80 kg/m²     Physical Exam   Constitutional: She is oriented to person, place, and time. She appears well-developed and well-nourished. No distress.   HENT:   Head: Normocephalic and atraumatic.   Nose: Nose normal.   Mouth/Throat: Oropharynx is clear and moist. No oropharyngeal exudate.   Clear postnasal drip.  Left maxillary sinus tenderness.    Eyes: EOM are normal. Pupils are equal, round, and reactive to light.   Neck: Neck supple. No JVD present. No thyromegaly present.   Cardiovascular: Normal rate, regular rhythm " and normal heart sounds.   Pulmonary/Chest: Effort normal. No accessory muscle usage. No respiratory distress. She has wheezes. She has rhonchi. She has no rales.   Diminished excursion on expiratory phase of cough.   Increased bronchial sounds. Scattered rhonchi with expiratory wheezes.    Abdominal: Soft. Bowel sounds are normal. She exhibits no distension. There is no tenderness.   Musculoskeletal: She exhibits no edema.   Lymphadenopathy:     She has no cervical adenopathy.   Neurological: She is alert and oriented to person, place, and time. No cranial nerve deficit.   Psychiatric: She has a normal mood and affect. Her speech is normal and behavior is normal. Judgment and thought content normal.       Future Appointments   Date Time Provider Department Center   3/27/2020  9:00 AM Feliz Valiente MD [facility] None   10/23/2020 10:30 AM Natalia Aguirre MD [facility] None       Assessment/Plan      For the acute bronchitis and mild intermittent asthma flare, a prescription is sent for Z-pack as directed and Tessalon Perles 200 mg to take 1 capsule by mouth 3 (Three) Times a day as needed for cough.   Continue the Delsym.  A sample of Breo inhaler is given to use one inhalation daily.  Continue the Tylenol for headache or body aches.  After informed verbal consent, the patient receives injection today with Kenalog 20 mg and Depo-Medrol 80 mg IM without difficulty or complication.     Return in March for routine follow up with fasting labs one week prior or sooner if needed.     Scribed for Dr. Valiente by Samina Field, [facility].     Diagnoses and all orders for this visit:    Acute bronchitis, unspecified organism  -     methylPREDNISolone acetate (DEPO-medrol) injection 80 mg  -     triamcinolone acetonide (KENALOG-40) injection 20 mg    Mild intermittent asthma with acute exacerbation  -     methylPREDNISolone acetate (DEPO-medrol) injection 80 mg  -     triamcinolone acetonide (KENALOG-40) injection 20  mg    Nocturnal cough with wheeze    Other orders  -     azithromycin (ZITHROMAX) 250 MG tablet; Take 2 tablets the first day, then 1 tablet daily for 4 days.  -     benzonatate (TESSALON) 200 MG capsule; Take 1 capsule by mouth 3 (Three) Times a Day As Needed for Cough for up to 30 doses.        No visits with results within 3 Week(s) from this visit.   Latest known visit with results is:   Hospital Outpatient Visit on 10/25/2019   Component Date Value Ref Range Status   • BSA 10/25/2019 1.6  m^2 Final   • IVSd 10/25/2019 0.69  cm Final   • LVIDd 10/25/2019 3.4  cm Final   • LVIDs 10/25/2019 2.4  cm Final   • LVPWd 10/25/2019 1.1  cm Final   • IVS/LVPW 10/25/2019 0.62   Final   • FS 10/25/2019 29.9  % Final   • EDV(Teich) 10/25/2019 46.8  ml Final   • ESV(Teich) 10/25/2019 19.5  ml Final   • EF(Teich) 10/25/2019 58.2  % Final   • EDV(cubed) 10/25/2019 38.6  ml Final   • ESV(cubed) 10/25/2019 13.3  ml Final   • EF(cubed) 10/25/2019 65.5  % Final   • LV mass(C)d 10/25/2019 83.8  grams Final   • LV mass(C)dI 10/25/2019 51.1  grams/m^2 Final   • SV(Teich) 10/25/2019 27.2  ml Final   • SI(Teich) 10/25/2019 16.6  ml/m^2 Final   • SV(cubed) 10/25/2019 25.3  ml Final   • SI(cubed) 10/25/2019 15.4  ml/m^2 Final   • EPSS 10/25/2019 0.2  cm Final   • Ao root diam 10/25/2019 2.1  cm Final   • Ao root area 10/25/2019 3.5  cm^2 Final   • ACS 10/25/2019 1.6  cm Final   • LA dimension 10/25/2019 3.2  cm Final   • asc Aorta Diam 10/25/2019 2.7  cm Final   • Ao Isth Diam 10/25/2019 2.1  cm Final   • LA/Ao 10/25/2019 1.5   Final   • LVOT diam 10/25/2019 1.9  cm Final   • LVOT area 10/25/2019 2.8  cm^2 Final   • LVOT area(traced) 10/25/2019 2.8  cm^2 Final   • LVLd ap4 10/25/2019 6.2  cm Final   • EDV(MOD-sp4) 10/25/2019 27.1  ml Final   • LVLs ap4 10/25/2019 5.5  cm Final   • ESV(MOD-sp4) 10/25/2019 13.8  ml Final   • EF(MOD-sp4) 10/25/2019 49.1  % Final   • LVLd ap2 10/25/2019 6.6  cm Final   • EDV(MOD-sp2) 10/25/2019 44.9  ml Final    • LVLs ap2 10/25/2019 5.5  cm Final   • ESV(MOD-sp2) 10/25/2019 12.9  ml Final   • EF(MOD-sp2) 10/25/2019 71.3  % Final   • SV(MOD-sp4) 10/25/2019 13.3  ml Final   • SI(MOD-sp4) 10/25/2019 8.1  ml/m^2 Final   • SV(MOD-sp2) 10/25/2019 32.0  ml Final   • SI(MOD-sp2) 10/25/2019 19.5  ml/m^2 Final   • Ao root area (BSA corrected) 10/25/2019 1.3   Final   • LV Mullins Vol (BSA corrected) 10/25/2019 16.5  ml/m^2 Final   • LV Sys Vol (BSA corrected) 10/25/2019 8.4  ml/m^2 Final   • MV E max talha 10/25/2019 147.0  cm/sec Final   • MV A max talha 10/25/2019 151.0  cm/sec Final   • MV E/A 10/25/2019 0.97   Final   • MV V2 max 10/25/2019 162.0  cm/sec Final   • MV max PG 10/25/2019 10.5  mmHg Final   • MV V2 mean 10/25/2019 78.2  cm/sec Final   • MV mean PG 10/25/2019 3.0  mmHg Final   • MV V2 VTI 10/25/2019 51.6  cm Final   • MVA(VTI) 10/25/2019 1.5  cm^2 Final   • MV P1/2t max talha 10/25/2019 160.0  cm/sec Final   • MV P1/2t 10/25/2019 77.3  msec Final   • MVA(P1/2t) 10/25/2019 2.8  cm^2 Final   • MV dec slope 10/25/2019 606.0  cm/sec^2 Final   • Ao pk talha 10/25/2019 168.0  cm/sec Final   • Ao max PG 10/25/2019 11.3  mmHg Final   • Ao max PG (full) 10/25/2019 5.1  mmHg Final   • Ao V2 mean 10/25/2019 118.0  cm/sec Final   • Ao mean PG 10/25/2019 6.0  mmHg Final   • Ao mean PG (full) 10/25/2019 3.0  mmHg Final   • Ao V2 VTI 10/25/2019 39.6  cm Final   • JOSE(I,A) 10/25/2019 2.0  cm^2 Final   • JOSE(I,D) 10/25/2019 2.0  cm^2 Final   • JOSE(V,A) 10/25/2019 2.1  cm^2 Final   • JOSE(V,D) 10/25/2019 2.1  cm^2 Final   • LV V1 max PG 10/25/2019 6.2  mmHg Final   • LV V1 mean PG 10/25/2019 3.0  mmHg Final   • LV V1 max 10/25/2019 124.0  cm/sec Final   • LV V1 mean 10/25/2019 83.0  cm/sec Final   • LV V1 VTI 10/25/2019 27.4  cm Final   • MR max talha 10/25/2019 486.0  cm/sec Final   • MR max PG 10/25/2019 94.5  mmHg Final   • SV(Ao) 10/25/2019 137.2  ml Final   • SI(Ao) 10/25/2019 83.7  ml/m^2 Final   • SV(LVOT) 10/25/2019 77.7  ml Final   •  SI(LVOT) 10/25/2019 47.4  ml/m^2 Final   • PA V2 max 10/25/2019 112.0  cm/sec Final   • PA max PG 10/25/2019 5.0  mmHg Final   • PA V2 mean 10/25/2019 85.6  cm/sec Final   • PA mean PG 10/25/2019 3.0  mmHg Final   • PA V2 VTI 10/25/2019 28.5  cm Final   • PI end-d talha 10/25/2019 81.4  cm/sec Final   • TR max talha 10/25/2019 223.0  cm/sec Final   • RVSP(TR) 10/25/2019 22.9  mmHg Final   • RAP systole 10/25/2019 3.0  mmHg Final   • MVA P1/2T LCG 10/25/2019 1.4  cm^2 Final   • BH CV ECHO ANTONELLA - BZI_BMI 10/25/2019 22.7  kilograms/m^2 Final   • BH CV ECHO ANTONELLA - BSA(HAYCOCK) 10/25/2019 1.6  m^2 Final   • BH CV ECHO ANTONELLA - BZI_METRIC_WEIGHT 10/25/2019 59.9  kg Final   • BH CV ECHO ANTONELLA - BZI_METRIC_HEIGHT 10/25/2019 162.6  cm Final   • Echo EF Estimated 10/25/2019 61  % Final   ]

## 2020-03-03 ENCOUNTER — OFFICE VISIT (OUTPATIENT)
Dept: FAMILY MEDICINE CLINIC | Facility: CLINIC | Age: 75
End: 2020-03-03

## 2020-03-03 VITALS
WEIGHT: 126.8 LBS | HEART RATE: 75 BPM | OXYGEN SATURATION: 96 % | TEMPERATURE: 97.8 F | BODY MASS INDEX: 21.65 KG/M2 | SYSTOLIC BLOOD PRESSURE: 146 MMHG | DIASTOLIC BLOOD PRESSURE: 78 MMHG | HEIGHT: 64 IN

## 2020-03-03 DIAGNOSIS — R09.82 POSTNASAL DRIP: ICD-10-CM

## 2020-03-03 DIAGNOSIS — J06.9 VIRAL URI: Primary | ICD-10-CM

## 2020-03-03 DIAGNOSIS — R51.9 ACUTE NONINTRACTABLE HEADACHE, UNSPECIFIED HEADACHE TYPE: ICD-10-CM

## 2020-03-03 PROCEDURE — 99213 OFFICE O/P EST LOW 20 MIN: CPT | Performed by: INTERNAL MEDICINE

## 2020-03-03 PROCEDURE — 96372 THER/PROPH/DIAG INJ SC/IM: CPT | Performed by: INTERNAL MEDICINE

## 2020-03-03 RX ORDER — TRIAMCINOLONE ACETONIDE 40 MG/ML
20 INJECTION, SUSPENSION INTRA-ARTICULAR; INTRAMUSCULAR ONCE
Status: COMPLETED | OUTPATIENT
Start: 2020-03-03 | End: 2020-03-03

## 2020-03-03 RX ORDER — METHYLPREDNISOLONE ACETATE 80 MG/ML
80 INJECTION, SUSPENSION INTRA-ARTICULAR; INTRALESIONAL; INTRAMUSCULAR; SOFT TISSUE ONCE
Status: COMPLETED | OUTPATIENT
Start: 2020-03-03 | End: 2020-03-03

## 2020-03-03 RX ORDER — PHENYLEPHRINE HCL 10 MG/1
TABLET, FILM COATED ORAL
Qty: 36 TABLET | Refills: 0 | Status: SHIPPED | OUTPATIENT
Start: 2020-03-03 | End: 2022-11-30

## 2020-03-03 RX ADMIN — TRIAMCINOLONE ACETONIDE 20 MG: 40 INJECTION, SUSPENSION INTRA-ARTICULAR; INTRAMUSCULAR at 16:20

## 2020-03-03 RX ADMIN — METHYLPREDNISOLONE ACETATE 80 MG: 80 INJECTION, SUSPENSION INTRA-ARTICULAR; INTRALESIONAL; INTRAMUSCULAR; SOFT TISSUE at 16:19

## 2020-03-03 NOTE — PROGRESS NOTES
"Subjective        History of Present Illness     Supriya Alberto is a 74 y.o. female who comes in reporting 3-week history of upper respiratory symptoms including scratchy throat, ear pressure, sinus pressure, headache, nasal congestion, and rare mostly nonproductive cough, sometimes productive of thick clear discharge.  She has been taking Mucinex ER and using steroid nasal spray twice daily with improvement.  She has not used sinus irrigation.  Denies sick contacts she is aware of.  Denies flulike aches and pains.      Review of Systems   Constitutional: Negative for chills, fatigue and fever.   HENT: Positive for congestion, postnasal drip, sinus pressure and sore throat. Negative for ear pain.    Respiratory: Positive for cough. Negative for shortness of breath and wheezing.    Cardiovascular: Negative for chest pain, palpitations and leg swelling.   Gastrointestinal: Negative for abdominal pain, blood in stool, constipation, diarrhea, nausea and vomiting.   Endocrine: Negative for cold intolerance, heat intolerance, polydipsia and polyuria.   Genitourinary: Negative for dysuria, frequency, hematuria and urgency.   Skin: Negative for rash.   Neurological: Negative for syncope and weakness.        Objective     Visit Vitals  /78   Pulse 75   Temp 97.8 °F (36.6 °C) (Oral)   Ht 162.6 cm (64\")   Wt 57.5 kg (126 lb 12.8 oz)   SpO2 96%   Breastfeeding No   BMI 21.77 kg/m²     Physical Exam   Constitutional: She is oriented to person, place, and time. She appears well-developed and well-nourished. No distress.   HENT:   Head: Normocephalic and atraumatic.   Nose: Right sinus exhibits maxillary sinus tenderness. Left sinus exhibits maxillary sinus tenderness.   Mouth/Throat: Oropharynx is clear and moist. No oropharyngeal exudate.   Bilateral maxillary sinus tenderness and nasal congestion.  Ear exam reveals small bilateral effusions bialterally.      Eyes: Pupils are equal, round, and reactive to light. EOM are " normal.   Neck: Neck supple. No JVD present. No thyromegaly present.   Cardiovascular: Normal rate, regular rhythm and normal heart sounds.   Pulmonary/Chest: Effort normal and breath sounds normal. No accessory muscle usage. No respiratory distress. She has no wheezes. She has no rales.   Abdominal: Soft. Bowel sounds are normal. She exhibits no distension. There is no tenderness.   Musculoskeletal: She exhibits no edema.   Lymphadenopathy:     She has no cervical adenopathy.   Neurological: She is alert and oriented to person, place, and time. No cranial nerve deficit.   Psychiatric: She has a normal mood and affect. Her speech is normal and behavior is normal. Judgment and thought content normal.       Future Appointments   Date Time Provider Department Center   3/27/2020  9:00 AM Feliz Valiente MD [facility] None   4/1/2020 10:00 AM [facility] MAMM 1 [facility] [place]   4/1/2020 10:30 AM [facility] DEXA 1 [facility] DEXA [facility] [place]   10/23/2020 10:30 AM Natalia Aguirre MD [facility] None       Assessment/Plan      For the viral URI symptoms, a prescription is sent for Sudafed PE 10 mg 1 po 2-3 times daily prn congestion.  After informed verbal consent, patient is given Kenalog 20 mg and Depo-Medrol 80 mg IM without difficulty or complications.  Patient tolerated well without complications.  Continue Flonase nasal spray one spray each nostril b.i.d.  Resume using humidifier in the bedroom at night for the remainder of the winter heating months and add saline nasal spray several times daily.  Recommended open windows on warmer giselle days to allow moisture in.      Return later this month (March 27th) for routine follow up with fasting labs one week prior or sooner if needed.     Scribed for Dr. Valiente by Samina Field [facility].     Diagnoses and all orders for this visit:    Viral URI  -     methylPREDNISolone acetate (DEPO-medrol) injection 80 mg  -     triamcinolone acetonide (KENALOG-40) injection 20 mg    Postnasal  drip    Acute nonintractable headache, unspecified headache type    Other orders  -     phenylephrine (SUDAFED PE) 10 MG tablet; 1 po 2-3 times daily prn congestion        No visits with results within 3 Week(s) from this visit.   Latest known visit with results is:   Hospital Outpatient Visit on 10/25/2019   Component Date Value Ref Range Status   • BSA 10/25/2019 1.6  m^2 Final   • IVSd 10/25/2019 0.69  cm Final   • LVIDd 10/25/2019 3.4  cm Final   • LVIDs 10/25/2019 2.4  cm Final   • LVPWd 10/25/2019 1.1  cm Final   • IVS/LVPW 10/25/2019 0.62   Final   • FS 10/25/2019 29.9  % Final   • EDV(Teich) 10/25/2019 46.8  ml Final   • ESV(Teich) 10/25/2019 19.5  ml Final   • EF(Teich) 10/25/2019 58.2  % Final   • EDV(cubed) 10/25/2019 38.6  ml Final   • ESV(cubed) 10/25/2019 13.3  ml Final   • EF(cubed) 10/25/2019 65.5  % Final   • LV mass(C)d 10/25/2019 83.8  grams Final   • LV mass(C)dI 10/25/2019 51.1  grams/m^2 Final   • SV(Teich) 10/25/2019 27.2  ml Final   • SI(Teich) 10/25/2019 16.6  ml/m^2 Final   • SV(cubed) 10/25/2019 25.3  ml Final   • SI(cubed) 10/25/2019 15.4  ml/m^2 Final   • EPSS 10/25/2019 0.2  cm Final   • Ao root diam 10/25/2019 2.1  cm Final   • Ao root area 10/25/2019 3.5  cm^2 Final   • ACS 10/25/2019 1.6  cm Final   • LA dimension 10/25/2019 3.2  cm Final   • asc Aorta Diam 10/25/2019 2.7  cm Final   • Ao Isth Diam 10/25/2019 2.1  cm Final   • LA/Ao 10/25/2019 1.5   Final   • LVOT diam 10/25/2019 1.9  cm Final   • LVOT area 10/25/2019 2.8  cm^2 Final   • LVOT area(traced) 10/25/2019 2.8  cm^2 Final   • LVLd ap4 10/25/2019 6.2  cm Final   • EDV(MOD-sp4) 10/25/2019 27.1  ml Final   • LVLs ap4 10/25/2019 5.5  cm Final   • ESV(MOD-sp4) 10/25/2019 13.8  ml Final   • EF(MOD-sp4) 10/25/2019 49.1  % Final   • LVLd ap2 10/25/2019 6.6  cm Final   • EDV(MOD-sp2) 10/25/2019 44.9  ml Final   • LVLs ap2 10/25/2019 5.5  cm Final   • ESV(MOD-sp2) 10/25/2019 12.9  ml Final   • EF(MOD-sp2) 10/25/2019 71.3  % Final   •  SV(MOD-sp4) 10/25/2019 13.3  ml Final   • SI(MOD-sp4) 10/25/2019 8.1  ml/m^2 Final   • SV(MOD-sp2) 10/25/2019 32.0  ml Final   • SI(MOD-sp2) 10/25/2019 19.5  ml/m^2 Final   • Ao root area (BSA corrected) 10/25/2019 1.3   Final   • LV Mullins Vol (BSA corrected) 10/25/2019 16.5  ml/m^2 Final   • LV Sys Vol (BSA corrected) 10/25/2019 8.4  ml/m^2 Final   • MV E max talha 10/25/2019 147.0  cm/sec Final   • MV A max talha 10/25/2019 151.0  cm/sec Final   • MV E/A 10/25/2019 0.97   Final   • MV V2 max 10/25/2019 162.0  cm/sec Final   • MV max PG 10/25/2019 10.5  mmHg Final   • MV V2 mean 10/25/2019 78.2  cm/sec Final   • MV mean PG 10/25/2019 3.0  mmHg Final   • MV V2 VTI 10/25/2019 51.6  cm Final   • MVA(VTI) 10/25/2019 1.5  cm^2 Final   • MV P1/2t max talha 10/25/2019 160.0  cm/sec Final   • MV P1/2t 10/25/2019 77.3  msec Final   • MVA(P1/2t) 10/25/2019 2.8  cm^2 Final   • MV dec slope 10/25/2019 606.0  cm/sec^2 Final   • Ao pk talha 10/25/2019 168.0  cm/sec Final   • Ao max PG 10/25/2019 11.3  mmHg Final   • Ao max PG (full) 10/25/2019 5.1  mmHg Final   • Ao V2 mean 10/25/2019 118.0  cm/sec Final   • Ao mean PG 10/25/2019 6.0  mmHg Final   • Ao mean PG (full) 10/25/2019 3.0  mmHg Final   • Ao V2 VTI 10/25/2019 39.6  cm Final   • JOSE(I,A) 10/25/2019 2.0  cm^2 Final   • JOSE(I,D) 10/25/2019 2.0  cm^2 Final   • JOSE(V,A) 10/25/2019 2.1  cm^2 Final   • JOSE(V,D) 10/25/2019 2.1  cm^2 Final   • LV V1 max PG 10/25/2019 6.2  mmHg Final   • LV V1 mean PG 10/25/2019 3.0  mmHg Final   • LV V1 max 10/25/2019 124.0  cm/sec Final   • LV V1 mean 10/25/2019 83.0  cm/sec Final   • LV V1 VTI 10/25/2019 27.4  cm Final   • MR max talha 10/25/2019 486.0  cm/sec Final   • MR max PG 10/25/2019 94.5  mmHg Final   • SV(Ao) 10/25/2019 137.2  ml Final   • SI(Ao) 10/25/2019 83.7  ml/m^2 Final   • SV(LVOT) 10/25/2019 77.7  ml Final   • SI(LVOT) 10/25/2019 47.4  ml/m^2 Final   • PA V2 max 10/25/2019 112.0  cm/sec Final   • PA max PG 10/25/2019 5.0  mmHg Final   •  PA V2 mean 10/25/2019 85.6  cm/sec Final   • PA mean PG 10/25/2019 3.0  mmHg Final   • PA V2 VTI 10/25/2019 28.5  cm Final   • PI end-d talha 10/25/2019 81.4  cm/sec Final   • TR max talha 10/25/2019 223.0  cm/sec Final   • RVSP(TR) 10/25/2019 22.9  mmHg Final   • RAP systole 10/25/2019 3.0  mmHg Final   • MVA P1/2T LCG 10/25/2019 1.4  cm^2 Final   • BH CV ECHO ANTONELLA - BZI_BMI 10/25/2019 22.7  kilograms/m^2 Final   • BH CV ECHO ANTONELLA - BSA([place]OCK) 10/25/2019 1.6  m^2 Final   • BH CV ECHO ANTONELLA - BZI_METRIC_WEIGHT 10/25/2019 59.9  kg Final   • BH CV ECHO ANTONELLA - BZI_METRIC_HEIGHT 10/25/2019 162.6  cm Final   • Echo EF Estimated 10/25/2019 61  % Final   ]

## 2020-03-20 ENCOUNTER — LAB (OUTPATIENT)
Dept: LAB | Facility: OTHER | Age: 75
End: 2020-03-20

## 2020-03-20 DIAGNOSIS — M85.89 OSTEOPENIA OF MULTIPLE SITES: Chronic | ICD-10-CM

## 2020-03-20 DIAGNOSIS — I47.1 SVT (SUPRAVENTRICULAR TACHYCARDIA) (HCC): Chronic | ICD-10-CM

## 2020-03-20 DIAGNOSIS — E78.2 MIXED HYPERLIPIDEMIA: Chronic | ICD-10-CM

## 2020-03-20 LAB
25(OH)D3 SERPL-MCNC: 33.6 NG/ML (ref 30–100)
ALBUMIN SERPL-MCNC: 4.1 G/DL (ref 3.5–5)
ALBUMIN/GLOB SERPL: 1.1 G/DL (ref 1.1–1.8)
ALP SERPL-CCNC: 115 U/L (ref 38–126)
ALT SERPL W P-5'-P-CCNC: 14 U/L
ANION GAP SERPL CALCULATED.3IONS-SCNC: 9 MMOL/L (ref 5–15)
AST SERPL-CCNC: 19 U/L (ref 14–36)
BASOPHILS # BLD AUTO: 0.03 10*3/MM3 (ref 0–0.2)
BASOPHILS NFR BLD AUTO: 0.4 % (ref 0–1.5)
BILIRUB SERPL-MCNC: 0.4 MG/DL (ref 0.2–1.3)
BUN BLD-MCNC: 15 MG/DL (ref 7–23)
BUN/CREAT SERPL: 22.7 (ref 7–25)
CALCIUM SPEC-SCNC: 10 MG/DL (ref 8.4–10.2)
CHLORIDE SERPL-SCNC: 103 MMOL/L (ref 101–112)
CHOLEST SERPL-MCNC: 246 MG/DL (ref 150–200)
CO2 SERPL-SCNC: 29 MMOL/L (ref 22–30)
CREAT BLD-MCNC: 0.66 MG/DL (ref 0.52–1.04)
DEPRECATED RDW RBC AUTO: 44.7 FL (ref 37–54)
EOSINOPHIL # BLD AUTO: 0.05 10*3/MM3 (ref 0–0.4)
EOSINOPHIL NFR BLD AUTO: 0.6 % (ref 0.3–6.2)
ERYTHROCYTE [DISTWIDTH] IN BLOOD BY AUTOMATED COUNT: 12.8 % (ref 12.3–15.4)
GFR SERPL CREATININE-BSD FRML MDRD: 88 ML/MIN/1.73 (ref 39–90)
GLOBULIN UR ELPH-MCNC: 3.8 GM/DL (ref 2.3–3.5)
GLUCOSE BLD-MCNC: 94 MG/DL (ref 70–99)
HCT VFR BLD AUTO: 42.8 % (ref 34–46.6)
HDLC SERPL-MCNC: 128 MG/DL (ref 40–59)
HGB BLD-MCNC: 14 G/DL (ref 12–15.9)
LDLC SERPL CALC-MCNC: 101 MG/DL
LDLC/HDLC SERPL: 0.79 {RATIO} (ref 0–3.22)
LYMPHOCYTES # BLD AUTO: 2.52 10*3/MM3 (ref 0.7–3.1)
LYMPHOCYTES NFR BLD AUTO: 31.1 % (ref 19.6–45.3)
MCH RBC QN AUTO: 31.8 PG (ref 26.6–33)
MCHC RBC AUTO-ENTMCNC: 32.7 G/DL (ref 31.5–35.7)
MCV RBC AUTO: 97.3 FL (ref 79–97)
MONOCYTES # BLD AUTO: 0.97 10*3/MM3 (ref 0.1–0.9)
MONOCYTES NFR BLD AUTO: 12 % (ref 5–12)
NEUTROPHILS # BLD AUTO: 4.52 10*3/MM3 (ref 1.7–7)
NEUTROPHILS NFR BLD AUTO: 55.9 % (ref 42.7–76)
PLATELET # BLD AUTO: 376 10*3/MM3 (ref 140–450)
PMV BLD AUTO: 9.1 FL (ref 6–12)
POTASSIUM BLD-SCNC: 4.2 MMOL/L (ref 3.4–5)
PROT SERPL-MCNC: 7.9 G/DL (ref 6.3–8.6)
RBC # BLD AUTO: 4.4 10*6/MM3 (ref 3.77–5.28)
SODIUM BLD-SCNC: 141 MMOL/L (ref 137–145)
TRIGL SERPL-MCNC: 84 MG/DL
TSH SERPL DL<=0.05 MIU/L-ACNC: 4.28 UIU/ML (ref 0.27–4.2)
VLDLC SERPL-MCNC: 16.8 MG/DL
WBC NRBC COR # BLD: 8.09 10*3/MM3 (ref 3.4–10.8)

## 2020-03-20 PROCEDURE — 85025 COMPLETE CBC W/AUTO DIFF WBC: CPT | Performed by: INTERNAL MEDICINE

## 2020-03-20 PROCEDURE — 36415 COLL VENOUS BLD VENIPUNCTURE: CPT | Performed by: INTERNAL MEDICINE

## 2020-03-20 PROCEDURE — 80053 COMPREHEN METABOLIC PANEL: CPT | Performed by: INTERNAL MEDICINE

## 2020-03-20 PROCEDURE — 84443 ASSAY THYROID STIM HORMONE: CPT | Performed by: INTERNAL MEDICINE

## 2020-03-20 PROCEDURE — 80061 LIPID PANEL: CPT | Performed by: INTERNAL MEDICINE

## 2020-03-20 PROCEDURE — 82306 VITAMIN D 25 HYDROXY: CPT | Performed by: INTERNAL MEDICINE

## 2020-04-15 DIAGNOSIS — M85.89 OSTEOPENIA OF MULTIPLE SITES: ICD-10-CM

## 2020-04-15 DIAGNOSIS — Z12.31 ENCOUNTER FOR SCREENING MAMMOGRAM FOR MALIGNANT NEOPLASM OF BREAST: Primary | ICD-10-CM

## 2020-04-15 DIAGNOSIS — Z00.00 ROUTINE ADULT HEALTH MAINTENANCE: Primary | ICD-10-CM

## 2020-05-08 RX ORDER — DILTIAZEM HYDROCHLORIDE 120 MG/1
CAPSULE, EXTENDED RELEASE ORAL
Qty: 90 CAPSULE | Refills: 2 | Status: SHIPPED | OUTPATIENT
Start: 2020-05-08 | End: 2021-02-03

## 2020-06-10 ENCOUNTER — TELEPHONE (OUTPATIENT)
Dept: FAMILY MEDICINE CLINIC | Facility: CLINIC | Age: 75
End: 2020-06-10

## 2020-06-10 RX ORDER — IBANDRONATE SODIUM 150 MG/1
150 TABLET, FILM COATED ORAL
Qty: 1 TABLET | Refills: 11 | Status: SHIPPED | OUTPATIENT
Start: 2020-06-10 | End: 2021-07-19 | Stop reason: SINTOL

## 2020-06-10 NOTE — TELEPHONE ENCOUNTER
06/10/2020 I relayed results and sent meds to pharmacy. Also explained instructions and she voiced understanding.  TP            ----- Message from Feliz Valiente MD sent at 5/28/2020  1:07 PM CDT -----  Inform Supriya that her repeat DEXA reveals new osteoporosis of the hip and mild osteopenia of the lumbar spine.  Start Boniva 150 mg monthly with usual instructions.  She has GERD, so needs to notify us if it aggravates her reflux or heartburn symptoms.  Continue her calcium and vitamin D supplements.  Pursue daily walking for exercise to strengthen the bones.  We will repeat DEXA in 2 years. EB

## 2020-06-22 RX ORDER — OMEPRAZOLE 40 MG/1
CAPSULE, DELAYED RELEASE ORAL
Qty: 30 CAPSULE | Refills: 0 | Status: SHIPPED | OUTPATIENT
Start: 2020-06-22 | End: 2020-07-21

## 2020-06-22 RX ORDER — PAROXETINE HYDROCHLORIDE 20 MG/1
TABLET, FILM COATED ORAL
Qty: 30 TABLET | Refills: 0 | Status: SHIPPED | OUTPATIENT
Start: 2020-06-22 | End: 2020-07-21

## 2020-07-06 VITALS — WEIGHT: 126 LBS | BODY MASS INDEX: 21.51 KG/M2 | HEIGHT: 64 IN

## 2020-07-06 NOTE — PROGRESS NOTES
The ABCs of the Annual Wellness Visit  Subsequent Medicare Wellness Visit    Chief Complaint   Patient presents with   • Medicare Wellness-subsequent       Subjective   History of Present Illness:  Supriya Alberto is a 75 y.o. female who presents for a Subsequent Medicare Wellness Visit.  You have chosen to receive care through a telephone visit. Do you consent to use a telephone visit for your medical care today? Yes      HEALTH RISK ASSESSMENT    Recent Hospitalizations:  No hospitalization(s) within the last year.    Current Medical Providers:  Patient Care Team:  Feliz Valiente MD as PCP - General (Internal Medicine)    Smoking Status:  Social History     Tobacco Use   Smoking Status Never Smoker   Smokeless Tobacco Never Used       Alcohol Consumption:  Social History     Substance and Sexual Activity   Alcohol Use No       Depression Screen:   PHQ-2/PHQ-9 Depression Screening 7/6/2020   Little interest or pleasure in doing things 0   Feeling down, depressed, or hopeless 0   Trouble falling or staying asleep, or sleeping too much -   Feeling tired or having little energy -   Poor appetite or overeating -   Feeling bad about yourself - or that you are a failure or have let yourself or your family down -   Trouble concentrating on things, such as reading the newspaper or watching television -   Moving or speaking so slowly that other people could have noticed. Or the opposite - being so fidgety or restless that you have been moving around a lot more than usual -   Thoughts that you would be better off dead, or of hurting yourself in some way -   Total Score 0   If you checked off any problems, how difficult have these problems made it for you to do your work, take care of things at home, or get along with other people? -       Fall Risk Screen:  STEADI Fall Risk Assessment was completed, and patient is at LOW risk for falls.Assessment completed on:7/6/2020    Health Habits and Functional and Cognitive  Screening:  Functional & Cognitive Status 7/6/2020   Do you have difficulty preparing food and eating? No   Do you have difficulty bathing yourself, getting dressed or grooming yourself? No   Do you have difficulty using the toilet? No   Do you have difficulty moving around from place to place? No   Do you have trouble with steps or getting out of a bed or a chair? No   Current Diet Well Balanced Diet   Dental Exam Not up to date   Eye Exam Up to date   Exercise (times per week) 5 times per week   Current Exercise Activities Include Walking   Do you need help using the phone?  No   Are you deaf or do you have serious difficulty hearing?  No   Do you need help with transportation? No   Do you need help shopping? No   Do you need help preparing meals?  No   Do you need help with housework?  No   Do you need help with laundry? No   Do you need help taking your medications? No   Do you need help managing money? -   Do you ever drive or ride in a car without wearing a seat belt? No   Have you felt unusual stress, anger or loneliness in the last month? No   Who do you live with? Spouse   If you need help, do you have trouble finding someone available to you? -   Have you been bothered in the last four weeks by sexual problems? No   Do you have difficulty concentrating, remembering or making decisions? Yes         Does the patient have evidence of cognitive impairment? No    Asprin use counseling:Does not need ASA (and currently is not on it)    Age-appropriate Screening Schedule:  Refer to the list below for future screening recommendations based on patient's age, sex and/or medical conditions. Orders for these recommended tests are listed in the plan section. The patient has been provided with a written plan.    Health Maintenance   Topic Date Due   • ZOSTER VACCINE (1 of 2) 04/26/1997   • INFLUENZA VACCINE  08/01/2020   • LIPID PANEL  03/20/2021   • MAMMOGRAM  05/27/2022   • DXA SCAN  05/27/2022   • COLONOSCOPY   04/08/2024   • TDAP/TD VACCINES (2 - Td) 03/01/2028          The following portions of the patient's history were reviewed and updated as appropriate:   She  has a past medical history of Allergic rhinitis, Angina pectoris (CMS/HCC), Chronic seasonal allergic rhinitis due to pollen, Constipation, Depressive disorder, Diverticulitis of sigmoid colon, Dizziness, Eustachian tube disorder, Jw hematuria, Generalized anxiety disorder, GERD (gastroesophageal reflux disease), Hyperlipidemia, Malaise and fatigue, Menopause, Mild diastolic dysfunction, Mitral valve regurgitation-moderate, Osteopenia, Palpitations, Paroxysmal supraventricular tachycardia (CMS/HCC), Prolapse of vaginal vault after hysterectomy, Rosacea, SVT (supraventricular tachycardia) - S/P ablation 2015, and Syncope.  She does not have any pertinent problems on file.  She  has a past surgical history that includes Cardiac Ablation (02/2015); Cardiac catheterization (11/12/2014); endoscopy and colonoscopy (04/2014); Colpopexy (01/28/2013); Hemorrhoid surgery (2005); Hysterectomy; and Oophorectomy.  Her family history includes Heart disease in an other family member; Lung cancer in her father.  She  reports that she has never smoked. She has never used smokeless tobacco. She reports that she does not drink alcohol or use drugs.  Current Outpatient Medications   Medication Sig Dispense Refill   • benzonatate (TESSALON) 200 MG capsule Take 1 capsule by mouth 3 (Three) Times a Day As Needed for Cough for up to 30 doses. 30 capsule 0   • Calcium Carbonate-Vit D-Min (CALTRATE 600+D PLUS PO) Take 1 tablet by mouth 2 (Two) Times a Day.     • CARTIA  MG 24 hr capsule Take 1 capsule by mouth once daily 90 capsule 2   • CLOBETASOL PROPIONATE EMULSION EX Apply  topically.     • fluticasone (FLONASE) 50 MCG/ACT nasal spray USE 2 SPRAY(S) IN EACH NOSTRIL ONCE DAILY 1 bottle 3   • guaiFENesin (MUCINEX) 600 MG 12 hr tablet Take 1 tablet by mouth 2 (Two) Times a  Day. 14 tablet 0   • ibandronate (Boniva) 150 MG tablet Take 1 tablet by mouth Every 30 (Thirty) Days. 1 tablet 11   • ketoconazole (NIZORAL) 2 % shampoo Apply  topically to the appropriate area as directed 2 (Two) Times a Week.     • meloxicam (MOBIC) 15 MG tablet Take 1 tablet by mouth Daily As Needed (pain). Take with food 30 tablet 0   • omeprazole (priLOSEC) 40 MG capsule TAKE 1 CAPSULE BY MOUTH ONCE DAILY IN THE MORNING FOR REFLUX 30 capsule 0   • PARoxetine (PAXIL) 20 MG tablet TAKE 1 TABLET BY MOUTH ONCE DAILY AT BEDTIME 30 tablet 0   • phenylephrine (SUDAFED PE) 10 MG tablet 1 po 2-3 times daily prn congestion 36 tablet 0   • tiZANidine (ZANAFLEX) 4 MG tablet 1/2-1 qhs prn muscle spasms 30 tablet 0   • triamcinolone (KENALOG) 0.1 % cream Apply  topically to the appropriate area as directed 3 (Three) Times a Day. For itching 80 g 0   • triamcinolone (KENALOG) 0.1 % lotion Apply  topically to the appropriate area as directed 3 (Three) Times a Day. 120 mL 2     No current facility-administered medications for this visit.      Current Outpatient Medications on File Prior to Visit   Medication Sig   • benzonatate (TESSALON) 200 MG capsule Take 1 capsule by mouth 3 (Three) Times a Day As Needed for Cough for up to 30 doses.   • Calcium Carbonate-Vit D-Min (CALTRATE 600+D PLUS PO) Take 1 tablet by mouth 2 (Two) Times a Day.   • CARTIA  MG 24 hr capsule Take 1 capsule by mouth once daily   • CLOBETASOL PROPIONATE EMULSION EX Apply  topically.   • fluticasone (FLONASE) 50 MCG/ACT nasal spray USE 2 SPRAY(S) IN EACH NOSTRIL ONCE DAILY   • guaiFENesin (MUCINEX) 600 MG 12 hr tablet Take 1 tablet by mouth 2 (Two) Times a Day.   • ibandronate (Boniva) 150 MG tablet Take 1 tablet by mouth Every 30 (Thirty) Days.   • ketoconazole (NIZORAL) 2 % shampoo Apply  topically to the appropriate area as directed 2 (Two) Times a Week.   • meloxicam (MOBIC) 15 MG tablet Take 1 tablet by mouth Daily As Needed (pain). Take with  food   • omeprazole (priLOSEC) 40 MG capsule TAKE 1 CAPSULE BY MOUTH ONCE DAILY IN THE MORNING FOR REFLUX   • PARoxetine (PAXIL) 20 MG tablet TAKE 1 TABLET BY MOUTH ONCE DAILY AT BEDTIME   • phenylephrine (SUDAFED PE) 10 MG tablet 1 po 2-3 times daily prn congestion   • tiZANidine (ZANAFLEX) 4 MG tablet 1/2-1 qhs prn muscle spasms   • triamcinolone (KENALOG) 0.1 % cream Apply  topically to the appropriate area as directed 3 (Three) Times a Day. For itching   • triamcinolone (KENALOG) 0.1 % lotion Apply  topically to the appropriate area as directed 3 (Three) Times a Day.     No current facility-administered medications on file prior to visit.      She is allergic to crestor [rosuvastatin calcium]; adhesive tape; and latex..    Outpatient Medications Prior to Visit   Medication Sig Dispense Refill   • benzonatate (TESSALON) 200 MG capsule Take 1 capsule by mouth 3 (Three) Times a Day As Needed for Cough for up to 30 doses. 30 capsule 0   • Calcium Carbonate-Vit D-Min (CALTRATE 600+D PLUS PO) Take 1 tablet by mouth 2 (Two) Times a Day.     • CARTIA  MG 24 hr capsule Take 1 capsule by mouth once daily 90 capsule 2   • CLOBETASOL PROPIONATE EMULSION EX Apply  topically.     • fluticasone (FLONASE) 50 MCG/ACT nasal spray USE 2 SPRAY(S) IN EACH NOSTRIL ONCE DAILY 1 bottle 3   • guaiFENesin (MUCINEX) 600 MG 12 hr tablet Take 1 tablet by mouth 2 (Two) Times a Day. 14 tablet 0   • ibandronate (Boniva) 150 MG tablet Take 1 tablet by mouth Every 30 (Thirty) Days. 1 tablet 11   • ketoconazole (NIZORAL) 2 % shampoo Apply  topically to the appropriate area as directed 2 (Two) Times a Week.     • meloxicam (MOBIC) 15 MG tablet Take 1 tablet by mouth Daily As Needed (pain). Take with food 30 tablet 0   • omeprazole (priLOSEC) 40 MG capsule TAKE 1 CAPSULE BY MOUTH ONCE DAILY IN THE MORNING FOR REFLUX 30 capsule 0   • PARoxetine (PAXIL) 20 MG tablet TAKE 1 TABLET BY MOUTH ONCE DAILY AT BEDTIME 30 tablet 0   • phenylephrine  "(SUDAFED PE) 10 MG tablet 1 po 2-3 times daily prn congestion 36 tablet 0   • tiZANidine (ZANAFLEX) 4 MG tablet 1/2-1 qhs prn muscle spasms 30 tablet 0   • triamcinolone (KENALOG) 0.1 % cream Apply  topically to the appropriate area as directed 3 (Three) Times a Day. For itching 80 g 0   • triamcinolone (KENALOG) 0.1 % lotion Apply  topically to the appropriate area as directed 3 (Three) Times a Day. 120 mL 2     No facility-administered medications prior to visit.        Patient Active Problem List   Diagnosis   • Rosacea   • Prolapse of vaginal vault after hysterectomy   • SVT (supraventricular tachycardia) - S/P ablation 2015   • Palpitations   • Osteopenia   • Menopause   • Malaise and fatigue   • Hyperlipidemia   • GERD (gastroesophageal reflux disease)   • Generalized anxiety disorder   • Jw hematuria   • Eustachian tube disorder   • Dizziness   • Diverticulitis of sigmoid colon   • Depressive disorder   • Constipation   • Angina pectoris (CMS/HCC)   • Allergic rhinitis   • Mitral valve regurgitation-moderate   • Mild diastolic dysfunction   • Nonrheumatic aortic valve insufficiency   • Varicose veins of both lower extremities       Advanced Care Planning:  ACP discussion was held with the patient during this visit. Patient does not have an advance directive, information provided.    Review of Systems    Compared to one year ago, the patient feels her physical health is the same.  Compared to one year ago, the patient feels her mental health is the same.    Reviewed chart for potential of high risk medication in the elderly: yes  Reviewed chart for potential of harmful drug interactions in the elderly:yes    Objective         Vitals:    07/06/20 1656   Weight: 57.2 kg (126 lb)   Height: 162.6 cm (64\")   PainSc: 0-No pain       Body mass index is 21.63 kg/m².  Discussed the patient's BMI with her. The BMI is in the acceptable range.    Physical Exam          Assessment/Plan   Medicare Risks and Personalized " Health Plan  CMS Preventative Services Quick Reference  Advance Directive Discussion  Immunizations Discussed/Encouraged (specific immunizations; Pneumococcal 23 )     I asked the patient to come in soon for Pneumovax vaccine.  The above risks/problems have been discussed with the patient.  Pertinent information has been shared with the patient in the After Visit Summary.  Follow up plans and orders are seen below in the Assessment/Plan Section.    Diagnoses and all orders for this visit:    1. Medicare annual wellness visit, subsequent (Primary)    2. Need for pneumococcal vaccination      Follow Up:  Return for Medicare Wellness.     An After Visit Summary and PPPS were given to the patient.

## 2020-07-06 NOTE — PATIENT INSTRUCTIONS
Medicare Wellness  Personal Prevention Plan of Service     Date of Office Visit:  2020  Encounter Provider:  Feliz Valiente MD  Place of Service:  [facility]  Patient Name: Supriya Alberto  :  1945    As part of the Medicare Wellness portion of your visit today, we are providing you with this personalized preventive plan of services (PPPS). This plan is based upon recommendations of the [place] Preventive Services Task Force (USPSTF) and the Advisory Committee on Immunization Practices (ACIP).    This lists the preventive care services that should be considered, and provides dates of when you are due. Items listed as completed are up-to-date and do not require any further intervention.    Health Maintenance   Topic Date Due   • ZOSTER VACCINE (1 of 2) 1997   • Pneumococcal Vaccine Once at 65 Years Old  2012   • HEPATITIS C SCREENING  11/10/2016   • MEDICARE ANNUAL WELLNESS  2020   • INFLUENZA VACCINE  2020   • LIPID PANEL  2021   • MAMMOGRAM  2022   • DXA SCAN  2022   • COLONOSCOPY  2024   • TDAP/TD VACCINES (2 - Td) 2028       No orders of the defined types were placed in this encounter.      No follow-ups on file.

## 2020-07-07 ENCOUNTER — OFFICE VISIT (OUTPATIENT)
Dept: FAMILY MEDICINE CLINIC | Facility: CLINIC | Age: 75
End: 2020-07-07

## 2020-07-07 DIAGNOSIS — Z23 NEED FOR PNEUMOCOCCAL VACCINATION: ICD-10-CM

## 2020-07-07 DIAGNOSIS — Z00.00 MEDICARE ANNUAL WELLNESS VISIT, SUBSEQUENT: Primary | ICD-10-CM

## 2020-07-07 PROCEDURE — G0439 PPPS, SUBSEQ VISIT: HCPCS | Performed by: INTERNAL MEDICINE

## 2020-07-14 ENCOUNTER — OFFICE VISIT (OUTPATIENT)
Dept: FAMILY MEDICINE CLINIC | Facility: CLINIC | Age: 75
End: 2020-07-14

## 2020-07-14 VITALS
WEIGHT: 126 LBS | BODY MASS INDEX: 21.51 KG/M2 | HEIGHT: 64 IN | DIASTOLIC BLOOD PRESSURE: 65 MMHG | HEART RATE: 78 BPM | SYSTOLIC BLOOD PRESSURE: 137 MMHG

## 2020-07-14 DIAGNOSIS — M81.0 AGE-RELATED OSTEOPOROSIS WITHOUT CURRENT PATHOLOGICAL FRACTURE: Chronic | ICD-10-CM

## 2020-07-14 DIAGNOSIS — E78.2 MIXED HYPERLIPIDEMIA: Chronic | ICD-10-CM

## 2020-07-14 DIAGNOSIS — I47.1 SVT (SUPRAVENTRICULAR TACHYCARDIA) (HCC): Chronic | ICD-10-CM

## 2020-07-14 DIAGNOSIS — K21.9 GASTROESOPHAGEAL REFLUX DISEASE, ESOPHAGITIS PRESENCE NOT SPECIFIED: Chronic | ICD-10-CM

## 2020-07-14 DIAGNOSIS — M85.89 OSTEOPENIA OF MULTIPLE SITES: Primary | Chronic | ICD-10-CM

## 2020-07-14 PROCEDURE — G2025 DIS SITE TELE SVCS RHC/FQHC: HCPCS | Performed by: INTERNAL MEDICINE

## 2020-07-14 NOTE — PROGRESS NOTES
Subjective      You have chosen to receive care through a telephone visit. Do you consent to use a telephone visit for your medical care today? Yes  Total visit time: 21 minutes.        History of Present Illness     Supriya Alberto is a 75 y.o. female who presents for ovedue annual exam and follow up on chronic medical issues including hyperlipiemia,  PSVT, GERD/gastritis, osteopenia, generalized anxiety disorder, seasonal allergies, mild intermittent asthma, and dysthymia among other issues.  She has been busy june green beans.  Dr. Aguirre performed successful pathway ablation 02/2015 and continues to follow her annually for moderate mitral valve regurgitation.  Overall, GERD symptoms are adequately managed with Prilosec, although, she notices mild symptoms when eating her apple/oatmeal for breakfast.       DEXA 05/2020 reveals new osteoporosis of the hip and mild osteopenia of the lumbar spine, for which we started Boniva 150 mg monthly as well as calcium and vitamin D supplements.  She stopped the Boniva after one dose due to myalgias and mild leg pain, but mostly due to concerns regarding the side effects.  Vitamin D remains stable 33.6.  I explained how benefits outweigh risks with the Boniva and also discussed alternative ways to treat osteoporosis.  She is in agreement to try the Boniva again with the stipulation she will let me know if she cannot tolerate the medication after 1-2 doses and not wait until her next routine follow up.     She has not had Pneumovax and agrees to stop by the office at her earliest convenience to have the vaccine.   I also encouraged patient to get her influenza vaccine in September.     Her BP and heart rate are at goal    She has been off of statins for a couple of years.  Her lipid panel remains relatively stable.  Her very high HDL of 128 gives her an excellent LDL/HDL ratio of 0.79.         The patient's relevant past medical, surgical, and social history was reviewed in  "Epic.   Lab results from 03/2020 are reviewed with the patient today. CBC unremarkable. Fasting glucose 94. Normal renal and liver function.   Total cholesterol 246.  HDL excellent at 128.  .  Triglycerides 84.  Her excellent HDL gives her a favorable ratio of 0.79 as noted above.  Vitamin D at goal.      Review of Systems   Constitutional: Negative for chills, fatigue and fever.   HENT: Negative for congestion, ear pain, postnasal drip, sinus pressure and sore throat.    Respiratory: Negative for cough, shortness of breath and wheezing.    Cardiovascular: Negative for chest pain, palpitations and leg swelling.   Gastrointestinal: Negative for abdominal pain, blood in stool, constipation, diarrhea, nausea and vomiting.   Endocrine: Negative for cold intolerance, heat intolerance, polydipsia and polyuria.   Genitourinary: Negative for dysuria, frequency, hematuria and urgency.   Skin: Negative for rash.   Neurological: Negative for syncope and weakness.        Objective     Visit Vitals  /68   Pulse 78   Ht 162.6 cm (64\")   Wt 57.2 kg (126 lb)   BMI 21.63 kg/m²     Physical Exam        Assessment/Plan      She agrees to resume her Boniva with the stipulation she will notify me if she cannot tolerate the medication after 1-2 doses and not wait until her next routine visit to make me aware so we can prescribe alternative therapy to treat the osteoporosis of the hip demonstrated on DEXA 05/2020.       Encouraged to stop by the office at her earliest convenience for Pneumovax.  I also recommended she have influenza vaccine in September when available.      Continue the Prilosec for GERD.  She can add p.r.n. antacids if needed for breakthrough symptoms.     Continue Paxil for CRISTOBAL.     She can remain off statin for now.  We will continue to monitor her lipids. Her high HDL keeps her ratio excellent.     Continue Cartia XT for her PSVT.      Continue her other medications and vitamin/mineral supplements to " treat the other medical issues we addressed today.    Scribed for Dr. Valiente by Samina Field [facility].     Diagnoses and all orders for this visit:    Osteopenia of multiple sites  -     Vitamin D 25 Hydroxy; Future    Gastroesophageal reflux disease, esophagitis presence not specified    SVT (supraventricular tachycardia) - S/P ablation 2015  -     Comprehensive Metabolic Panel; Future    Mixed hyperlipidemia  -     CBC Auto Differential; Future  -     Comprehensive Metabolic Panel; Future  -     Lipid Panel; Future  -     TSH; Future    Age-related osteoporosis without current pathological fracture  -     Vitamin D 25 Hydroxy; Future        No visits with results within 3 Week(s) from this visit.   Latest known visit with results is:   Lab on 03/20/2020   Component Date Value Ref Range Status   • WBC 03/20/2020 8.09  3.40 - 10.80 10*3/mm3 Final   • RBC 03/20/2020 4.40  3.77 - 5.28 10*6/mm3 Final   • Hemoglobin 03/20/2020 14.0  12.0 - 15.9 g/dL Final   • Hematocrit 03/20/2020 42.8  34.0 - 46.6 % Final   • MCV 03/20/2020 97.3* 79.0 - 97.0 fL Final   • MCH 03/20/2020 31.8  26.6 - 33.0 pg Final   • MCHC 03/20/2020 32.7  31.5 - 35.7 g/dL Final   • RDW 03/20/2020 12.8  12.3 - 15.4 % Final   • RDW-SD 03/20/2020 44.7  37.0 - 54.0 fl Final   • MPV 03/20/2020 9.1  6.0 - 12.0 fL Final   • Platelets 03/20/2020 376  140 - 450 10*3/mm3 Final   • Neutrophil % 03/20/2020 55.9  42.7 - 76.0 % Final   • Lymphocyte % 03/20/2020 31.1  19.6 - 45.3 % Final   • Monocyte % 03/20/2020 12.0  5.0 - 12.0 % Final   • Eosinophil % 03/20/2020 0.6  0.3 - 6.2 % Final   • Basophil % 03/20/2020 0.4  0.0 - 1.5 % Final   • Neutrophils, Absolute 03/20/2020 4.52  1.70 - 7.00 10*3/mm3 Final   • Lymphocytes, Absolute 03/20/2020 2.52  0.70 - 3.10 10*3/mm3 Final   • Monocytes, Absolute 03/20/2020 0.97* 0.10 - 0.90 10*3/mm3 Final   • Eosinophils, Absolute 03/20/2020 0.05  0.00 - 0.40 10*3/mm3 Final   • Basophils, Absolute 03/20/2020 0.03  0.00 - 0.20  10*3/mm3 Final   • Glucose 03/20/2020 94  70 - 99 mg/dL Final   • BUN 03/20/2020 15  7 - 23 mg/dL Final   • Creatinine 03/20/2020 0.66  0.52 - 1.04 mg/dL Final   • Sodium 03/20/2020 141  137 - 145 mmol/L Final   • Potassium 03/20/2020 4.2  3.4 - 5.0 mmol/L Final   • Chloride 03/20/2020 103  101 - 112 mmol/L Final   • CO2 03/20/2020 29.0  22.0 - 30.0 mmol/L Final   • Calcium 03/20/2020 10.0  8.4 - 10.2 mg/dL Final   • Total Protein 03/20/2020 7.9  6.3 - 8.6 g/dL Final   • Albumin 03/20/2020 4.10  3.50 - 5.00 g/dL Final   • ALT (SGPT) 03/20/2020 14  <=35 U/L Final   • AST (SGOT) 03/20/2020 19  14 - 36 U/L Final   • Alkaline Phosphatase 03/20/2020 115  38 - 126 U/L Final   • Total Bilirubin 03/20/2020 0.4  0.2 - 1.3 mg/dL Final   • eGFR Non African Amer 03/20/2020 88  39 - 90 mL/min/1.73 Final   • Globulin 03/20/2020 3.8* 2.3 - 3.5 gm/dL Final   • A/G Ratio 03/20/2020 1.1  1.1 - 1.8 g/dL Final   • BUN/Creatinine Ratio 03/20/2020 22.7  7.0 - 25.0 Final   • Anion Gap 03/20/2020 9.0  5.0 - 15.0 mmol/L Final   • Total Cholesterol 03/20/2020 246* 150 - 200 mg/dL Final   • Triglycerides 03/20/2020 84  <=150 mg/dL Final   • HDL Cholesterol 03/20/2020 128* 40 - 59 mg/dL Final   • LDL Cholesterol  03/20/2020 101* <=100 mg/dL Final   • VLDL Cholesterol 03/20/2020 16.8  mg/dL Final   • LDL/HDL Ratio 03/20/2020 0.79  0.00 - 3.22 Final   • 25 Hydroxy, Vitamin D 03/20/2020 33.6  30.0 - 100.0 ng/ml Final   • TSH 03/20/2020 4.280* 0.270 - 4.200 uIU/mL Final   ]

## 2020-07-14 NOTE — PATIENT INSTRUCTIONS
Exercising to Stay Healthy  To become healthy and stay healthy, it is recommended that you do moderate-intensity and vigorous-intensity exercise. You can tell that you are exercising at a moderate intensity if your heart starts beating faster and you start breathing faster but can still hold a conversation. You can tell that you are exercising at a vigorous intensity if you are breathing much harder and faster and cannot hold a conversation while exercising.  Exercising regularly is important. It has many health benefits, such as:  · Improving overall fitness, flexibility, and endurance.  · Increasing bone density.  · Helping with weight control.  · Decreasing body fat.  · Increasing muscle strength.  · Reducing stress and tension.  · Improving overall health.  How often should I exercise?  Choose an activity that you enjoy, and set realistic goals. Your health care provider can help you make an activity plan that works for you.  Exercise regularly as told by your health care provider. This may include:  · Doing strength training two times a week, such as:  ? Lifting weights.  ? Using resistance bands.  ? Push-ups.  ? Sit-ups.  ? Yoga.  · Doing a certain intensity of exercise for a given amount of time. Choose from these options:  ? A total of 150 minutes of moderate-intensity exercise every week.  ? A total of 75 minutes of vigorous-intensity exercise every week.  ? A mix of moderate-intensity and vigorous-intensity exercise every week.  Children, pregnant women, people who have not exercised regularly, people who are overweight, and older adults may need to talk with a health care provider about what activities are safe to do. If you have a medical condition, be sure to talk with your health care provider before you start a new exercise program.  What are some exercise ideas?  Moderate-intensity exercise ideas include:  · Walking 1 mile (1.6 km) in about 15  minutes.  · Biking.  · Hiking.  · Golfing.  · Dancing.  · Water aerobics.  Vigorous-intensity exercise ideas include:  · Walking 4.5 miles (7.2 km) or more in about 1 hour.  · Jogging or running 5 miles (8 km) in about 1 hour.  · Biking 10 miles (16.1 km) or more in about 1 hour.  · Lap swimming.  · Roller-skating or in-line skating.  · Cross-country skiing.  · Vigorous competitive sports, such as football, basketball, and soccer.  · Jumping rope.  · Aerobic dancing.  What are some everyday activities that can help me to get exercise?  · Yard work, such as:  ? Pushing a .  ? Raking and bagging leaves.  · Washing your car.  · Pushing a stroller.  · Shoveling snow.  · Gardening.  · Washing windows or floors.  How can I be more active in my day-to-day activities?  · Use stairs instead of an elevator.  · Take a walk during your lunch break.  · If you drive, park your car farther away from your work or school.  · If you take public transportation, get off one stop early and walk the rest of the way.  · Stand up or walk around during all of your indoor phone calls.  · Get up, stretch, and walk around every 30 minutes throughout the day.  · Enjoy exercise with a friend. Support to continue exercising will help you keep a regular routine of activity.  What guidelines can I follow while exercising?  · Before you start a new exercise program, talk with your health care provider.  · Do not exercise so much that you hurt yourself, feel dizzy, or get very short of breath.  · Wear comfortable clothes and wear shoes with good support.  · Drink plenty of water while you exercise to prevent dehydration or heat stroke.  · Work out until your breathing and your heartbeat get faster.  Where to find more information  · [place] Department of Health and Human Services: www.hhs.gov  · Centers for Disease Control and Prevention (CDC): www.cdc.gov  Summary  · Exercising regularly is important. It will improve your overall fitness,  flexibility, and endurance.  · Regular exercise also will improve your overall health. It can help you control your weight, reduce stress, and improve your bone density.  · Do not exercise so much that you hurt yourself, feel dizzy, or get very short of breath.  · Before you start a new exercise program, talk with your health care provider.  This information is not intended to replace advice given to you by your health care provider. Make sure you discuss any questions you have with your health care provider.  Document Released: 01/20/2012 Document Revised: 11/30/2018 Document Reviewed: 11/08/2018  Elsevier Patient Education © 2020 [employer]

## 2020-07-21 RX ORDER — PAROXETINE HYDROCHLORIDE 20 MG/1
TABLET, FILM COATED ORAL
Qty: 30 TABLET | Refills: 11 | Status: SHIPPED | OUTPATIENT
Start: 2020-07-21 | End: 2021-07-13

## 2020-07-21 RX ORDER — OMEPRAZOLE 40 MG/1
CAPSULE, DELAYED RELEASE ORAL
Qty: 30 CAPSULE | Refills: 11 | Status: SHIPPED | OUTPATIENT
Start: 2020-07-21 | End: 2021-07-13

## 2020-07-22 ENCOUNTER — CLINICAL SUPPORT (OUTPATIENT)
Dept: FAMILY MEDICINE CLINIC | Facility: CLINIC | Age: 75
End: 2020-07-22

## 2020-07-22 DIAGNOSIS — Z23 NEED FOR PNEUMOCOCCAL VACCINATION: Primary | ICD-10-CM

## 2020-07-22 PROCEDURE — G0009 ADMIN PNEUMOCOCCAL VACCINE: HCPCS | Performed by: INTERNAL MEDICINE

## 2020-07-22 PROCEDURE — 90732 PPSV23 VACC 2 YRS+ SUBQ/IM: CPT | Performed by: INTERNAL MEDICINE

## 2020-10-02 ENCOUNTER — LAB (OUTPATIENT)
Dept: LAB | Facility: OTHER | Age: 75
End: 2020-10-02

## 2020-10-02 ENCOUNTER — OFFICE VISIT (OUTPATIENT)
Dept: FAMILY MEDICINE CLINIC | Facility: CLINIC | Age: 75
End: 2020-10-02

## 2020-10-02 VITALS — WEIGHT: 126 LBS | HEIGHT: 64 IN | BODY MASS INDEX: 21.51 KG/M2

## 2020-10-02 DIAGNOSIS — Z23 NEED FOR IMMUNIZATION AGAINST INFLUENZA: ICD-10-CM

## 2020-10-02 DIAGNOSIS — R30.0 DYSURIA: Primary | ICD-10-CM

## 2020-10-02 DIAGNOSIS — N30.00 ACUTE CYSTITIS WITHOUT HEMATURIA: Primary | ICD-10-CM

## 2020-10-02 DIAGNOSIS — N39.41 URGE INCONTINENCE OF URINE: ICD-10-CM

## 2020-10-02 LAB
BACTERIA UR QL AUTO: ABNORMAL /HPF
BILIRUB UR QL STRIP: NEGATIVE
CLARITY UR: ABNORMAL
COLOR UR: YELLOW
GLUCOSE UR STRIP-MCNC: NEGATIVE MG/DL
HGB UR QL STRIP.AUTO: ABNORMAL
HYALINE CASTS UR QL AUTO: ABNORMAL /LPF
KETONES UR QL STRIP: NEGATIVE
LEUKOCYTE ESTERASE UR QL STRIP.AUTO: ABNORMAL
NITRITE UR QL STRIP: NEGATIVE
PH UR STRIP.AUTO: 6.5 [PH] (ref 5.5–8)
PROT UR QL STRIP: NEGATIVE
RBC # UR: ABNORMAL /HPF
REF LAB TEST METHOD: ABNORMAL
SP GR UR STRIP: 1.01 (ref 1–1.03)
SQUAMOUS #/AREA URNS HPF: ABNORMAL /HPF
UROBILINOGEN UR QL STRIP: ABNORMAL
WBC UR QL AUTO: ABNORMAL /HPF

## 2020-10-02 PROCEDURE — G2025 DIS SITE TELE SVCS RHC/FQHC: HCPCS | Performed by: INTERNAL MEDICINE

## 2020-10-02 PROCEDURE — 87086 URINE CULTURE/COLONY COUNT: CPT | Performed by: INTERNAL MEDICINE

## 2020-10-02 PROCEDURE — 81001 URINALYSIS AUTO W/SCOPE: CPT | Performed by: INTERNAL MEDICINE

## 2020-10-02 RX ORDER — PHENAZOPYRIDINE HYDROCHLORIDE 200 MG/1
200 TABLET, FILM COATED ORAL 3 TIMES DAILY PRN
Qty: 6 TABLET | Refills: 0 | Status: SHIPPED | OUTPATIENT
Start: 2020-10-02 | End: 2021-07-19

## 2020-10-02 RX ORDER — CEFUROXIME AXETIL 250 MG/1
250 TABLET ORAL 2 TIMES DAILY
Qty: 14 TABLET | Refills: 0 | Status: SHIPPED | OUTPATIENT
Start: 2020-10-02 | End: 2020-10-09

## 2020-10-02 NOTE — PROGRESS NOTES
Subjective      You have chosen to receive care through a telephone visit. Do you consent to use a telephone visit for your medical care today? Yes    Total visit time: 15 minutes.         History of Present Illness     Supriya Alberto is a 75 y.o. female who receives care via telephone visit to address acute onset yesterday of urinary hesitancy and frequency as well as burning with urination, requiring her to wear adult diapers yesterday.  Denies fever or chills.  Urinalysis earlier this morning reveals evidence of acute cystitis. Culture is pending.  She was a little concerned with the acute symptoms yesterday due to history of mesh sling placed to treat prolapse post hysterectomy.  I gave assurance symptoms were most likely related to acute cystitis and should improve with antibiotic treatment.  She was treated at Urgent Care for acute UTI spring of 2019 with Macrodantin and Pyridium, although, culture was not obtained.        Patient is asking about influenza vaccine.  I informed patient of some available options including influenza clinic offered here at the clinic tomorrow, having the vaccine at her local pharmacy, or scheduling an appointment to come in for influenza vaccine.         Review of Systems   Constitutional: Negative for chills, fatigue and fever.   HENT: Negative for congestion, ear pain, postnasal drip, sinus pressure and sore throat.    Respiratory: Negative for cough, shortness of breath and wheezing.    Cardiovascular: Negative for chest pain, palpitations and leg swelling.   Gastrointestinal: Negative for abdominal pain, blood in stool, constipation, diarrhea, nausea and vomiting.   Endocrine: Negative for cold intolerance, heat intolerance, polydipsia and polyuria.   Genitourinary: Positive for difficulty urinating, dysuria, frequency and urgency. Negative for hematuria.   Skin: Negative for rash.   Neurological: Negative for syncope and weakness.        Objective       Physical  Exam    Future Appointments   Date Time Provider Department Center   10/23/2020 10:30 AM Natalia Aguirre MD [facility] None   7/19/2021 10:00 AM Feliz Valiente MD [facility] None       Assessment/Plan      For the acute cystitis,  I sent a prescription for Ceftin 250 mg to take one tablet b.i.d. x 7 days and Pyridium to take as needed for bladder spasms.   Hydrate well.   We will review culture results when available.     Patient will come in for influenza vaccine at her convenience if the high dose vaccine is not available at her pharmacy.     Return next summer for routine follow up with fasting labs one week prior or sooner if needed.     Scribed for Dr. Valiente by Samina Field [facility].     Diagnoses and all orders for this visit:    Acute cystitis without hematuria    Urge incontinence of urine    Need for immunization against influenza    Other orders  -     cefuroxime (CEFTIN) 250 MG tablet; Take 1 tablet by mouth 2 (Two) Times a Day for 7 days.  -     phenazopyridine (Pyridium) 200 MG tablet; Take 1 tablet by mouth 3 (Three) Times a Day As Needed for Bladder Spasms.        Orders Only on 10/02/2020   Component Date Value Ref Range Status   • Color, UA 10/02/2020 Yellow  Yellow, Straw Final   • Appearance, UA 10/02/2020 Slightly Cloudy* Clear Final   • pH, UA 10/02/2020 6.5  5.5 - 8.0 Final   • Specific Gravity, UA 10/02/2020 1.010  1.005 - 1.030 Final   • Glucose, UA 10/02/2020 Negative  Negative Final   • Ketones, UA 10/02/2020 Negative  Negative Final   • Bilirubin, UA 10/02/2020 Negative  Negative Final   • Blood, UA 10/02/2020 Moderate (2+)* Negative Final   • Protein, UA 10/02/2020 Negative  Negative Final   • Leuk Esterase, UA 10/02/2020 Moderate (2+)* Negative Final   • Nitrite, UA 10/02/2020 Negative  Negative Final   • Urobilinogen, UA 10/02/2020 0.2 E.U./dL  0.2 - 1.0 E.U./dL Final   • RBC, UA 10/02/2020 3-5* None Seen /HPF Final   • WBC, UA 10/02/2020 21-30* None Seen /HPF Final   • Bacteria, UA  10/02/2020 Trace* None Seen /HPF Final   • Squamous Epithelial Cells, UA 10/02/2020 3-6* None Seen, 0-2 /HPF Final   • Hyaline Casts, UA 10/02/2020 None Seen  None Seen /LPF Final   • Methodology 10/02/2020 Manual Light Microscopy   Final   ]

## 2020-10-03 LAB — BACTERIA SPEC AEROBE CULT: ABNORMAL

## 2020-10-20 DIAGNOSIS — I47.1 SVT (SUPRAVENTRICULAR TACHYCARDIA) (HCC): Primary | Chronic | ICD-10-CM

## 2020-10-23 ENCOUNTER — OFFICE VISIT (OUTPATIENT)
Dept: CARDIOLOGY | Facility: CLINIC | Age: 75
End: 2020-10-23

## 2020-10-23 VITALS
HEART RATE: 67 BPM | WEIGHT: 129.2 LBS | DIASTOLIC BLOOD PRESSURE: 72 MMHG | HEIGHT: 64 IN | SYSTOLIC BLOOD PRESSURE: 140 MMHG | OXYGEN SATURATION: 99 % | BODY MASS INDEX: 22.06 KG/M2

## 2020-10-23 DIAGNOSIS — I34.0 NONRHEUMATIC MITRAL VALVE REGURGITATION: Chronic | ICD-10-CM

## 2020-10-23 DIAGNOSIS — E78.2 MIXED HYPERLIPIDEMIA: Chronic | ICD-10-CM

## 2020-10-23 DIAGNOSIS — I47.1 SVT (SUPRAVENTRICULAR TACHYCARDIA) (HCC): Primary | Chronic | ICD-10-CM

## 2020-10-23 DIAGNOSIS — I51.9 MILD DIASTOLIC DYSFUNCTION: ICD-10-CM

## 2020-10-23 DIAGNOSIS — I35.1 NONRHEUMATIC AORTIC VALVE INSUFFICIENCY: ICD-10-CM

## 2020-10-23 PROCEDURE — 93000 ELECTROCARDIOGRAM COMPLETE: CPT | Performed by: INTERNAL MEDICINE

## 2020-10-23 PROCEDURE — 99213 OFFICE O/P EST LOW 20 MIN: CPT | Performed by: INTERNAL MEDICINE

## 2020-10-23 NOTE — PROGRESS NOTES
Supriya Alberto  75 y.o. female    10/23/2020     1. SVT (supraventricular tachycardia) - S/P ablation 2015    2. Mixed hyperlipidemia    3. Nonrheumatic mitral valve regurgitation    4. Mild diastolic dysfunction    5. Nonrheumatic aortic valve insufficiency        History of Present Illness:    Patient's Body mass index is 22.18 kg/m². BMI is within normal parameters. No follow-up required.   .  75 years old patient presented for routine follow-up EKG discussed the patient sinus rhythm she is a pleased with critical outcome no further palpitation fluttering reported underwent cardiac catheterization noted to have normal coronaries with normal left systolic function.  Patient has symptomatic supraventricular tachycardia underwent EP study and successful slow pathway ablation.  Patient complains nonsustained palpitation with activity mechanism and undefined..  Normal left systolic function with a mild  aortic and tricuspid regurgitation and  moderate mitral regurgitation.  Repeat echo unchanged from the previous and finding discussed with the patient.  The patient has history of gastroesophageal reflux disease with a previous normal cardiac catheterization ptient denies orthopnea, PND, nauseous, vomiting, diarrhea.  Denies intermittent claudication.  Denies any palpitation or fluttering.  Denies any syncope or near syncopal episode.     Echo 10/25/2019  · Estimated EF = 61%.  · Left ventricular systolic function is normal.  · Left ventricular diastolic dysfunction (grade I) consistent with impaired relaxation.  · Mild aortic valve regurgitation is present.  · Moderate mitral valve regurgitation is present  · Mild tricuspid valve regurgitation is present.      3/2019     Total Cholesterol 150 - 200 mg/dL 253 Abnormally high     Triglycerides <=150 mg/dL 118    HDL Cholesterol 40 - 59 mg/dL 121 Abnormally high     LDL Cholesterol  <=100 mg/dL 108 Abnormally high     VLDL Cholesterol mg/dL 23.6    LDL/HDL Ratio 0.00 -  3.22 0.90             3/2018  Total Cholesterol 150 - 200 mg/dL 268     Triglycerides 35 - 160 mg/dL 103    HDL Cholesterol 35 - 100 mg/dL 114     LDL Cholesterol  mg/dL 133    VLDL Cholesterol mg/dL 20.6    LDL/HDL Ratio   1.17         ECHO 4/17/2017  · Left ventricular function is normal. Estimated EF = 60%.  · Left ventricular diastolic dysfunction (grade I) consistent with impaired relaxation.  · Mild aortic valve regurgitation is present.  · Moderate mitral valve regurgitation is present  · Mild tricuspid valve regurgitation is present        SUBJECTIVE:    Allergies   Allergen Reactions   • Crestor [Rosuvastatin Calcium] Myalgia   • Adhesive Tape Rash   • Latex Rash         Past Medical History:   Diagnosis Date   • Age-related osteoporosis without current pathological fracture 7/14/2020   • Allergic rhinitis    • Angina pectoris (CMS/HCC)    • Chronic seasonal allergic rhinitis due to pollen    • Constipation    • Depressive disorder    • Diverticulitis of sigmoid colon    • Dizziness    • Eustachian tube disorder    • Jw hematuria    • Generalized anxiety disorder    • GERD (gastroesophageal reflux disease)    • Hyperlipidemia    • Malaise and fatigue    • Menopause    • Mild diastolic dysfunction    • Mitral valve regurgitation-moderate    • Osteopenia    • Palpitations    • Paroxysmal supraventricular tachycardia (CMS/HCC)    • Prolapse of vaginal vault after hysterectomy    • Rosacea    • SVT (supraventricular tachycardia) - S/P ablation 2015    • Syncope          Past Surgical History:   Procedure Laterality Date   • CARDIAC ABLATION  02/2015    SVT pathway ablation, Dr. Aguirre   • CARDIAC CATHETERIZATION  11/12/2014    Normal coronaries. Normal LV systolic function   • COLPOPEXY VAGINAL  01/28/2013    laparoscopic uterisacral colpopexy (intraperitoneal) Lysis of adhesions (took 1.5 hrs of 2.5 hrs spent laparoscopically ) Tension free vaginal tape midurethral sling Posterior colporrhaphy.  Perineorrhaphy. Cystourethroscopy   • ENDOSCOPY AND COLONOSCOPY  04/2014    No polyps. Minimal diverticulosis (sigmoid). Dr Louise   • HEMORRHOIDECTOMY  2005   • HYSTERECTOMY     • OOPHORECTOMY           Family History   Problem Relation Age of Onset   • Lung cancer Father    • Heart disease Other          Social History     Socioeconomic History   • Marital status:      Spouse name: Not on file   • Number of children: Not on file   • Years of education: Not on file   • Highest education level: Not on file   Tobacco Use   • Smoking status: Never Smoker   • Smokeless tobacco: Never Used   Substance and Sexual Activity   • Alcohol use: No   • Drug use: No   • Sexual activity: Defer         Current Outpatient Medications   Medication Sig Dispense Refill   • Calcium Carbonate-Vit D-Min (CALTRATE 600+D PLUS PO) Take 1 tablet by mouth 2 (Two) Times a Day.     • CARTIA  MG 24 hr capsule Take 1 capsule by mouth once daily 90 capsule 2   • CLOBETASOL PROPIONATE EMULSION EX Apply  topically.     • fluticasone (FLONASE) 50 MCG/ACT nasal spray USE 2 SPRAY(S) IN EACH NOSTRIL ONCE DAILY 1 bottle 3   • guaiFENesin (MUCINEX) 600 MG 12 hr tablet Take 1 tablet by mouth 2 (Two) Times a Day. 14 tablet 0   • ibandronate (Boniva) 150 MG tablet Take 1 tablet by mouth Every 30 (Thirty) Days. 1 tablet 11   • ketoconazole (NIZORAL) 2 % shampoo Apply  topically to the appropriate area as directed 2 (Two) Times a Week.     • meloxicam (MOBIC) 15 MG tablet Take 1 tablet by mouth Daily As Needed (pain). Take with food 30 tablet 0   • omeprazole (priLOSEC) 40 MG capsule TAKE 1 CAPSULE BY MOUTH ONCE DAILY IN THE MORNING FOR REFLUX 30 capsule 11   • PARoxetine (PAXIL) 20 MG tablet TAKE 1 TABLET BY MOUTH ONCE DAILY AT BEDTIME 30 tablet 11   • phenazopyridine (Pyridium) 200 MG tablet Take 1 tablet by mouth 3 (Three) Times a Day As Needed for Bladder Spasms. 6 tablet 0   • phenylephrine (SUDAFED PE) 10 MG tablet 1 po 2-3 times daily prn  "congestion 36 tablet 0   • triamcinolone (KENALOG) 0.1 % cream Apply  topically to the appropriate area as directed 3 (Three) Times a Day. For itching 80 g 0   • triamcinolone (KENALOG) 0.1 % lotion Apply  topically to the appropriate area as directed 3 (Three) Times a Day. 120 mL 2     No current facility-administered medications for this visit.            Review of Systems:     Constitutional:  Denies recent weight loss, weight gain, fever or chills, no change in exercise tolerance.     HENT:  Denies any hearing loss, epistaxis, hoarseness, or difficulty speaking.     Eyes: No blurred  Respiratory:  Denies dyspnea with exertion,no cough, wheezing, or hemoptysis.     Cardiovascular: See H&P    Gastrointestinal: Gastroesophageal reflux disease     Endocrine: Negative for cold intolerance, heat intolerance, polydipsia, polyphagia and polyuria. Denies any history of weight change, polydipsia, polyuria.     Genitourinary: Negative.      Musculoskeletal: Denies any history of arthritic symptoms or back problems.     Skin:  Denies any change in hair or nails, rashes, or skin lesions.     Allergic/Immunologic: Negative.  Negative for environmental allergies, food allergies and immunocompromised state.     Neurological:  Denies any history of recurrent headaches, strokes, TIA, or seizure disorder.     Hematological: Denies any food allergies, seasonal allergies, bleeding disorders, or lymphadenopathy.     Psychiatric/Behavioral: Denies any history of depression, substance abuse, or change in cognitive function.       OBJECTIVE:    /72   Pulse 67   Ht 162.6 cm (64\")   Wt 58.6 kg (129 lb 3.2 oz)   SpO2 99%   BMI 22.18 kg/m²       Physical Exam:     Constitutional: Cooperative, alert and oriented, well-developed, well-nourished, in no acute distress.     HENT:   Head: Normocephalic, normal hair patterns, no masses or tenderness.  Ears, Nose, and Throat: No gross abnormalities. No pallor or cyanosis. Dentition good. "   Eyes: EOMS intact, PERRL, conjunctivae and lids unremarkable. Fundoscopic exam and visual fields not performed.   Neck: No palpable masses or adenopathy, no thyromegaly, no JVD, carotid pulses are full and equal bilaterally and without  Bruits.     Cardiovascular: Regular rhythm, S1 and S2 normal, no S3 or S4. Apical impulse not displaced. No murmurs, gallops, or rubs detected.     Pulmonary/Chest: Chest: normal symmetry, no tenderness to palpation, normal respiratory excursion, no intercostal retraction, no use of accessory muscles. Pulmonary: Normal breath sounds. No rales or rhonchi.    Abdominal: Abdomen soft, bowel sounds normoactive, no masses, no hepatosplenomegaly, non-tender, no bruits.     Musculoskeletal: No deformities, clubbing, cyanosis, erythema, or edema observed. There are no spinal abnormalities noted. Normal muscle strength and tone. Pulses full and equal in all extremities, no bruits auscultated.     Neurological: No gross motor or sensory deficits noted, affect appropriate, oriented to time, person, place.     Skin: Warm and dry to the touch, no apparent skin lesions or masses noted.     Psychiatric: She has a normal mood and affect. Her behavior is normal. Judgment and thought content normal.         Procedures      Lab Results   Component Value Date    WBC 8.09 03/20/2020    HGB 14.0 03/20/2020    HCT 42.8 03/20/2020    MCV 97.3 (H) 03/20/2020     03/20/2020     Lab Results   Component Value Date    GLUCOSE 94 03/20/2020    BUN 15 03/20/2020    CREATININE 0.66 03/20/2020    EGFRIFNONA 88 03/20/2020    BCR 22.7 03/20/2020    CO2 29.0 03/20/2020    CALCIUM 10.0 03/20/2020    ALBUMIN 4.10 03/20/2020    AST 19 03/20/2020    ALT 14 03/20/2020     Lab Results   Component Value Date    CHOL 246 (H) 03/20/2020    CHOL 253 (H) 03/18/2019    CHOL 268 (H) 03/15/2018     Lab Results   Component Value Date    TRIG 84 03/20/2020    TRIG 118 03/18/2019    TRIG 103 03/15/2018     Lab Results    Component Value Date     (H) 03/20/2020     (H) 03/18/2019     (H) 03/15/2018     No components found for: LDLCALC  Lab Results   Component Value Date     (H) 03/20/2020     (H) 03/18/2019     03/15/2018     No results found for: HDLLDLRATIO  No components found for: CHOLHDL  No results found for: HGBA1C  Lab Results   Component Value Date    TSH 4.280 (H) 03/20/2020           ASSESSMENT AND PLAN:    #1 supraventricular tachycardia status post EP study and ablation   #2 moderate mitral, mild aortic and mild tricuspid regurgitations.    #3 history of chest pain atypical with a normal cardia catheterizations     Clinically there is no sign of any acute cardiac decompensation based on the clinical history physical finding.  No Evidence of active ischemia.  We'll see her back in 12 months R depends on patient clinical conditions.  She will continue omeprazole with history of gastritis  And lovastatin for management of hyperlipidemia.  patient will continue present medication given asymptomatic status and compensated cardiac condition no change was made in the medical management  Diagnoses and all orders for this visit:    1. SVT (supraventricular tachycardia) - S/P ablation 2015 (Primary)    2. Mixed hyperlipidemia    3. Nonrheumatic mitral valve regurgitation    4. Mild diastolic dysfunction    5. Nonrheumatic aortic valve insufficiency          Natalia Aguirre MD  10/23/2020  11:04 CDT

## 2020-10-30 LAB
QT INTERVAL: 408 MS
QTC INTERVAL: 431 MS

## 2020-11-06 DIAGNOSIS — J01.00 ACUTE NON-RECURRENT MAXILLARY SINUSITIS: ICD-10-CM

## 2020-11-09 RX ORDER — FLUTICASONE PROPIONATE 50 MCG
SPRAY, SUSPENSION (ML) NASAL
Qty: 16 G | Refills: 0 | Status: SHIPPED | OUTPATIENT
Start: 2020-11-09 | End: 2021-01-26

## 2021-01-25 DIAGNOSIS — J01.00 ACUTE NON-RECURRENT MAXILLARY SINUSITIS: ICD-10-CM

## 2021-01-26 RX ORDER — FLUTICASONE PROPIONATE 50 MCG
SPRAY, SUSPENSION (ML) NASAL
Qty: 16 G | Refills: 0 | Status: SHIPPED | OUTPATIENT
Start: 2021-01-26 | End: 2021-03-29

## 2021-02-03 RX ORDER — DILTIAZEM HYDROCHLORIDE 120 MG/1
CAPSULE, EXTENDED RELEASE ORAL
Qty: 90 CAPSULE | Refills: 0 | Status: SHIPPED | OUTPATIENT
Start: 2021-02-03 | End: 2021-05-03

## 2021-03-28 DIAGNOSIS — J01.00 ACUTE NON-RECURRENT MAXILLARY SINUSITIS: ICD-10-CM

## 2021-03-29 RX ORDER — FLUTICASONE PROPIONATE 50 MCG
SPRAY, SUSPENSION (ML) NASAL
Qty: 16 G | Refills: 5 | Status: SHIPPED | OUTPATIENT
Start: 2021-03-29 | End: 2022-03-31

## 2021-04-13 DIAGNOSIS — Z12.31 ENCOUNTER FOR SCREENING MAMMOGRAM FOR MALIGNANT NEOPLASM OF BREAST: Primary | ICD-10-CM

## 2021-05-03 RX ORDER — DILTIAZEM HYDROCHLORIDE 120 MG/1
CAPSULE, COATED, EXTENDED RELEASE ORAL
Qty: 90 CAPSULE | Refills: 0 | Status: SHIPPED | OUTPATIENT
Start: 2021-05-03 | End: 2021-07-19 | Stop reason: SDUPTHER

## 2021-07-12 ENCOUNTER — LAB (OUTPATIENT)
Dept: LAB | Facility: OTHER | Age: 76
End: 2021-07-12

## 2021-07-12 DIAGNOSIS — M85.89 OSTEOPENIA OF MULTIPLE SITES: Chronic | ICD-10-CM

## 2021-07-12 DIAGNOSIS — E78.2 MIXED HYPERLIPIDEMIA: Chronic | ICD-10-CM

## 2021-07-12 DIAGNOSIS — M81.0 AGE-RELATED OSTEOPOROSIS WITHOUT CURRENT PATHOLOGICAL FRACTURE: Chronic | ICD-10-CM

## 2021-07-12 DIAGNOSIS — I47.1 SVT (SUPRAVENTRICULAR TACHYCARDIA) (HCC): Chronic | ICD-10-CM

## 2021-07-12 LAB
25(OH)D3 SERPL-MCNC: 52 NG/ML
ALBUMIN SERPL-MCNC: 4.5 G/DL (ref 3.5–5)
ALBUMIN/GLOB SERPL: 1.3 G/DL (ref 1.1–1.8)
ALP SERPL-CCNC: 119 U/L (ref 38–126)
ALT SERPL W P-5'-P-CCNC: 11 U/L
ANION GAP SERPL CALCULATED.3IONS-SCNC: 4 MMOL/L (ref 5–15)
AST SERPL-CCNC: 23 U/L (ref 14–36)
BASOPHILS # BLD AUTO: 0.05 10*3/MM3 (ref 0–0.2)
BASOPHILS NFR BLD AUTO: 0.8 % (ref 0–1.5)
BILIRUB SERPL-MCNC: 0.6 MG/DL (ref 0.2–1.3)
BUN SERPL-MCNC: 13 MG/DL (ref 7–23)
BUN/CREAT SERPL: 17.3 (ref 7–25)
CALCIUM SPEC-SCNC: 9.9 MG/DL (ref 8.4–10.2)
CHLORIDE SERPL-SCNC: 105 MMOL/L (ref 101–112)
CHOLEST SERPL-MCNC: 279 MG/DL (ref 150–200)
CO2 SERPL-SCNC: 31 MMOL/L (ref 22–30)
CREAT SERPL-MCNC: 0.75 MG/DL (ref 0.52–1.04)
DEPRECATED RDW RBC AUTO: 44.5 FL (ref 37–54)
EOSINOPHIL # BLD AUTO: 0.11 10*3/MM3 (ref 0–0.4)
EOSINOPHIL NFR BLD AUTO: 1.7 % (ref 0.3–6.2)
ERYTHROCYTE [DISTWIDTH] IN BLOOD BY AUTOMATED COUNT: 13.1 % (ref 12.3–15.4)
GFR SERPL CREATININE-BSD FRML MDRD: 76 ML/MIN/1.73 (ref 39–90)
GLOBULIN UR ELPH-MCNC: 3.6 GM/DL (ref 2.3–3.5)
GLUCOSE SERPL-MCNC: 105 MG/DL (ref 70–99)
HCT VFR BLD AUTO: 41 % (ref 34–46.6)
HDLC SERPL-MCNC: 107 MG/DL (ref 40–59)
HGB BLD-MCNC: 13.6 G/DL (ref 12–15.9)
LDLC SERPL CALC-MCNC: 149 MG/DL
LDLC/HDLC SERPL: 1.35 {RATIO} (ref 0–3.22)
LYMPHOCYTES # BLD AUTO: 2.15 10*3/MM3 (ref 0.7–3.1)
LYMPHOCYTES NFR BLD AUTO: 33.9 % (ref 19.6–45.3)
MCH RBC QN AUTO: 31.9 PG (ref 26.6–33)
MCHC RBC AUTO-ENTMCNC: 33.2 G/DL (ref 31.5–35.7)
MCV RBC AUTO: 96 FL (ref 79–97)
MONOCYTES # BLD AUTO: 0.71 10*3/MM3 (ref 0.1–0.9)
MONOCYTES NFR BLD AUTO: 11.2 % (ref 5–12)
NEUTROPHILS NFR BLD AUTO: 3.32 10*3/MM3 (ref 1.7–7)
NEUTROPHILS NFR BLD AUTO: 52.4 % (ref 42.7–76)
PLATELET # BLD AUTO: 315 10*3/MM3 (ref 140–450)
PMV BLD AUTO: 9.2 FL (ref 6–12)
POTASSIUM SERPL-SCNC: 4 MMOL/L (ref 3.4–5)
PROT SERPL-MCNC: 8.1 G/DL (ref 6.3–8.6)
RBC # BLD AUTO: 4.27 10*6/MM3 (ref 3.77–5.28)
SODIUM SERPL-SCNC: 140 MMOL/L (ref 137–145)
TRIGL SERPL-MCNC: 138 MG/DL
TSH SERPL DL<=0.05 MIU/L-ACNC: 4.06 UIU/ML (ref 0.27–4.2)
VLDLC SERPL-MCNC: 23 MG/DL (ref 5–40)
WBC # BLD AUTO: 6.34 10*3/MM3 (ref 3.4–10.8)

## 2021-07-12 PROCEDURE — 36415 COLL VENOUS BLD VENIPUNCTURE: CPT | Performed by: INTERNAL MEDICINE

## 2021-07-12 PROCEDURE — 85025 COMPLETE CBC W/AUTO DIFF WBC: CPT | Performed by: INTERNAL MEDICINE

## 2021-07-12 PROCEDURE — 80053 COMPREHEN METABOLIC PANEL: CPT | Performed by: INTERNAL MEDICINE

## 2021-07-12 PROCEDURE — 80061 LIPID PANEL: CPT | Performed by: INTERNAL MEDICINE

## 2021-07-12 PROCEDURE — 82306 VITAMIN D 25 HYDROXY: CPT | Performed by: INTERNAL MEDICINE

## 2021-07-12 PROCEDURE — 84443 ASSAY THYROID STIM HORMONE: CPT | Performed by: INTERNAL MEDICINE

## 2021-07-13 RX ORDER — OMEPRAZOLE 40 MG/1
CAPSULE, DELAYED RELEASE ORAL
Qty: 30 CAPSULE | Refills: 11 | Status: SHIPPED | OUTPATIENT
Start: 2021-07-13 | End: 2022-07-11

## 2021-07-13 RX ORDER — PAROXETINE HYDROCHLORIDE 20 MG/1
TABLET, FILM COATED ORAL
Qty: 30 TABLET | Refills: 11 | Status: SHIPPED | OUTPATIENT
Start: 2021-07-13 | End: 2022-07-11

## 2021-07-19 ENCOUNTER — OFFICE VISIT (OUTPATIENT)
Dept: FAMILY MEDICINE CLINIC | Facility: CLINIC | Age: 76
End: 2021-07-19

## 2021-07-19 VITALS
HEIGHT: 64 IN | SYSTOLIC BLOOD PRESSURE: 144 MMHG | TEMPERATURE: 97 F | WEIGHT: 123 LBS | BODY MASS INDEX: 21 KG/M2 | HEART RATE: 72 BPM | DIASTOLIC BLOOD PRESSURE: 70 MMHG

## 2021-07-19 DIAGNOSIS — K21.9 GASTROESOPHAGEAL REFLUX DISEASE, UNSPECIFIED WHETHER ESOPHAGITIS PRESENT: Chronic | ICD-10-CM

## 2021-07-19 DIAGNOSIS — Z00.00 MEDICARE ANNUAL WELLNESS VISIT, SUBSEQUENT: Primary | ICD-10-CM

## 2021-07-19 DIAGNOSIS — M81.0 AGE-RELATED OSTEOPOROSIS WITHOUT CURRENT PATHOLOGICAL FRACTURE: Chronic | ICD-10-CM

## 2021-07-19 DIAGNOSIS — I47.1 SVT (SUPRAVENTRICULAR TACHYCARDIA) (HCC): Chronic | ICD-10-CM

## 2021-07-19 DIAGNOSIS — R00.2 PALPITATIONS: ICD-10-CM

## 2021-07-19 DIAGNOSIS — M81.0 AGE-RELATED OSTEOPOROSIS WITHOUT CURRENT PATHOLOGICAL FRACTURE: Primary | ICD-10-CM

## 2021-07-19 DIAGNOSIS — F41.1 GENERALIZED ANXIETY DISORDER: Chronic | ICD-10-CM

## 2021-07-19 DIAGNOSIS — E78.2 MIXED HYPERLIPIDEMIA: Chronic | ICD-10-CM

## 2021-07-19 DIAGNOSIS — Z11.59 ENCOUNTER FOR HEPATITIS C SCREENING TEST FOR LOW RISK PATIENT: ICD-10-CM

## 2021-07-19 DIAGNOSIS — F32.A DEPRESSIVE DISORDER: Chronic | ICD-10-CM

## 2021-07-19 DIAGNOSIS — I10 ESSENTIAL HYPERTENSION: Chronic | ICD-10-CM

## 2021-07-19 PROCEDURE — 99214 OFFICE O/P EST MOD 30 MIN: CPT | Performed by: INTERNAL MEDICINE

## 2021-07-19 PROCEDURE — G0439 PPPS, SUBSEQ VISIT: HCPCS | Performed by: INTERNAL MEDICINE

## 2021-07-19 RX ORDER — DILTIAZEM HYDROCHLORIDE 180 MG/1
180 CAPSULE, COATED, EXTENDED RELEASE ORAL DAILY
Qty: 90 CAPSULE | Refills: 3 | Status: SHIPPED | OUTPATIENT
Start: 2021-07-19 | End: 2022-07-11

## 2021-07-19 RX ORDER — BUSPIRONE HYDROCHLORIDE 10 MG/1
10 TABLET ORAL 2 TIMES DAILY
Qty: 60 TABLET | Refills: 11 | Status: SHIPPED | OUTPATIENT
Start: 2021-07-19 | End: 2022-08-15

## 2021-07-19 RX ORDER — ONDANSETRON 4 MG/1
4 TABLET, FILM COATED ORAL EVERY 8 HOURS PRN
Qty: 20 TABLET | Refills: 0 | Status: SHIPPED | OUTPATIENT
Start: 2021-07-19

## 2021-07-19 RX ORDER — SODIUM CHLORIDE 9 MG/ML
250 INJECTION, SOLUTION INTRAVENOUS ONCE
Status: CANCELLED | OUTPATIENT
Start: 2021-07-19

## 2021-07-19 RX ORDER — LORATADINE 10 MG/1
10 TABLET ORAL DAILY
COMMUNITY

## 2021-07-19 NOTE — PATIENT INSTRUCTIONS
Medicare Wellness  Personal Prevention Plan of Service     Date of Office Visit:  2021  Encounter Provider:  Feliz Valiente MD  Place of Service:  [facility]  Patient Name: Supriya Alberto  :  1945    As part of the Medicare Wellness portion of your visit today, we are providing you with this personalized preventive plan of services (PPPS). This plan is based upon recommendations of the [place] Preventive Services Task Force (USPSTF) and the Advisory Committee on Immunization Practices (ACIP).    This lists the preventive care services that should be considered, and provides dates of when you are due. Items listed as completed are up-to-date and do not require any further intervention.    Health Maintenance   Topic Date Due   • ZOSTER VACCINE (1 of 2) Never done   • HEPATITIS C SCREENING  Never done   • ANNUAL WELLNESS VISIT  2021   • INFLUENZA VACCINE  2021   • DXA SCAN  2022   • LIPID PANEL  2022   • MAMMOGRAM  2023   • COLORECTAL CANCER SCREENING  2024   • TDAP/TD VACCINES (2 - Td or Tdap) 2028   • COVID-19 Vaccine  Completed   • Pneumococcal Vaccine 65+  Completed       No orders of the defined types were placed in this encounter.      Return in about 6 months (around 2022) for Follow up in six months with labs one week prior..

## 2021-07-19 NOTE — PROGRESS NOTES
The ABCs of the Annual Wellness Visit  Subsequent Medicare Wellness Visit    Chief Complaint   Patient presents with   • Annual Exam     states when she walks sometimes will have rapid heartbeat but she sees Dr. Aguirre in October    • Medicare Wellness-subsequent     Time spent on AWV today: 9 minutes    Subjective   History of Present Illness:  Supriya Alberto is a 76 y.o. female who presents for a Subsequent Medicare Wellness Visit.    HEALTH RISK ASSESSMENT    Recent Hospitalizations:  No hospitalization(s) within the last year.    Current Medical Providers:  Patient Care Team:  Feliz Valiente MD as PCP - General (Internal Medicine)    Smoking Status:  Social History     Tobacco Use   Smoking Status Never Smoker   Smokeless Tobacco Never Used       Alcohol Consumption:  Social History     Substance and Sexual Activity   Alcohol Use No       Depression Screen:   PHQ-2/PHQ-9 Depression Screening 7/19/2021   Little interest or pleasure in doing things 0   Feeling down, depressed, or hopeless 0   Trouble falling or staying asleep, or sleeping too much -   Feeling tired or having little energy -   Poor appetite or overeating -   Feeling bad about yourself - or that you are a failure or have let yourself or your family down -   Trouble concentrating on things, such as reading the newspaper or watching television -   Moving or speaking so slowly that other people could have noticed. Or the opposite - being so fidgety or restless that you have been moving around a lot more than usual -   Thoughts that you would be better off dead, or of hurting yourself in some way -   Total Score 0   If you checked off any problems, how difficult have these problems made it for you to do your work, take care of things at home, or get along with other people? -       Fall Risk Screen:  STEADI Fall Risk Assessment was completed, and patient is at LOW risk for falls.Assessment completed on:7/19/2021    Health Habits and Functional and  Cognitive Screening:  Functional & Cognitive Status 7/19/2021   Do you have difficulty preparing food and eating? No   Do you have difficulty bathing yourself, getting dressed or grooming yourself? No   Do you have difficulty using the toilet? No   Do you have difficulty moving around from place to place? No   Do you have trouble with steps or getting out of a bed or a chair? No   Current Diet Limited Junk Food   Dental Exam Not up to date   Eye Exam Up to date   Exercise (times per week) 4 times per week   Current Exercises Include Walking   Current Exercise Activities Include -   Do you need help using the phone?  No   Are you deaf or do you have serious difficulty hearing?  No   Do you need help with transportation? No   Do you need help shopping? No   Do you need help preparing meals?  No   Do you need help with housework?  No   Do you need help with laundry? No   Do you need help taking your medications? No   Do you need help managing money? No   Do you ever drive or ride in a car without wearing a seat belt? No   Have you felt unusual stress, anger or loneliness in the last month? No   Who do you live with? Spouse   If you need help, do you have trouble finding someone available to you? No   Have you been bothered in the last four weeks by sexual problems? No   Do you have difficulty concentrating, remembering or making decisions? Yes         Does the patient have evidence of cognitive impairment? No    Asprin use counseling:Does not need ASA (and currently is not on it)    Age-appropriate Screening Schedule:  Refer to the list below for future screening recommendations based on patient's age, sex and/or medical conditions. Orders for these recommended tests are listed in the plan section. The patient has been provided with a written plan.    Health Maintenance   Topic Date Due   • ZOSTER VACCINE (1 of 2) Never done   • INFLUENZA VACCINE  08/01/2021   • DXA SCAN  05/27/2022   • LIPID PANEL  07/12/2022   •  MAMMOGRAM  07/12/2023   • TDAP/TD VACCINES (2 - Td or Tdap) 03/01/2028          The following portions of the patient's history were reviewed and updated as appropriate:   She  has a past medical history of Age-related osteoporosis without current pathological fracture (7/14/2020), Allergic rhinitis, Angina pectoris (CMS/HCC), Chronic seasonal allergic rhinitis due to pollen, Constipation, Depressive disorder, Diverticulitis of sigmoid colon, Dizziness, Essential hypertension (7/19/2021), Eustachian tube disorder, Jw hematuria, Generalized anxiety disorder, GERD (gastroesophageal reflux disease), Hyperlipidemia, Malaise and fatigue, Menopause, Mild diastolic dysfunction, Mitral valve regurgitation-moderate, Mixed hyperlipidemia, Osteopenia, Palpitations, Paroxysmal supraventricular tachycardia (CMS/HCC), Prolapse of vaginal vault after hysterectomy, Rosacea, SVT (supraventricular tachycardia) - S/P ablation 2015, and Syncope.  She does not have any pertinent problems on file.  She  has a past surgical history that includes Cardiac Ablation (02/2015); Cardiac catheterization (11/12/2014); endoscopy and colonoscopy (04/2014); Colpopexy (01/28/2013); Hemorrhoid surgery (2005); Hysterectomy; and Oophorectomy.  Her family history includes Heart disease in an other family member; Lung cancer in her father.  She  reports that she has never smoked. She has never used smokeless tobacco. She reports that she does not drink alcohol and does not use drugs.  Current Outpatient Medications   Medication Sig Dispense Refill   • Calcium Carbonate-Vit D-Min (CALTRATE 600+D PLUS PO) Take 1 tablet by mouth 2 (Two) Times a Day.     • CLOBETASOL PROPIONATE EMULSION EX Apply  topically.     • dilTIAZem CD (CARDIZEM CD) 180 MG 24 hr capsule Take 1 capsule by mouth Daily. 90 capsule 3   • fluticasone (FLONASE) 50 MCG/ACT nasal spray Use 2 spray(s) in each nostril once daily 16 g 5   • loratadine (Claritin) 10 MG tablet Take 10 mg by  mouth Daily.     • omeprazole (priLOSEC) 40 MG capsule TAKE 1 CAPSULE BY MOUTH ONCE DAILY IN THE MORNING FOR  REFLUX 30 capsule 11   • PARoxetine (PAXIL) 20 MG tablet TAKE 1 TABLET BY MOUTH ONCE DAILY AT BEDTIME 30 tablet 11   • phenylephrine (SUDAFED PE) 10 MG tablet 1 po 2-3 times daily prn congestion 36 tablet 0   • Psyllium (METAMUCIL FIBER PO) Take  by mouth.     • busPIRone (BUSPAR) 10 MG tablet Take 1 tablet by mouth 2 (two) times a day. 60 tablet 11   • ketoconazole (NIZORAL) 2 % shampoo Apply  topically to the appropriate area as directed 2 (Two) Times a Week.     • ondansetron (Zofran) 4 MG tablet Take 1 tablet by mouth Every 8 (Eight) Hours As Needed for Nausea or Vomiting. 20 tablet 0   • triamcinolone (KENALOG) 0.1 % cream Apply  topically to the appropriate area as directed 3 (Three) Times a Day. For itching 80 g 0   • triamcinolone (KENALOG) 0.1 % lotion Apply  topically to the appropriate area as directed 3 (Three) Times a Day. 120 mL 2   • Zoster Vac Recomb Adjuvanted 50 MCG/0.5ML reconstituted suspension Inject 0.5 mL into the appropriate muscle as directed by prescriber Every 2 (Two) Months. 1 each 1     No current facility-administered medications for this visit.     Current Outpatient Medications on File Prior to Visit   Medication Sig   • Calcium Carbonate-Vit D-Min (CALTRATE 600+D PLUS PO) Take 1 tablet by mouth 2 (Two) Times a Day.   • CLOBETASOL PROPIONATE EMULSION EX Apply  topically.   • fluticasone (FLONASE) 50 MCG/ACT nasal spray Use 2 spray(s) in each nostril once daily   • loratadine (Claritin) 10 MG tablet Take 10 mg by mouth Daily.   • omeprazole (priLOSEC) 40 MG capsule TAKE 1 CAPSULE BY MOUTH ONCE DAILY IN THE MORNING FOR  REFLUX   • PARoxetine (PAXIL) 20 MG tablet TAKE 1 TABLET BY MOUTH ONCE DAILY AT BEDTIME   • phenylephrine (SUDAFED PE) 10 MG tablet 1 po 2-3 times daily prn congestion   • Psyllium (METAMUCIL FIBER PO) Take  by mouth.   • [DISCONTINUED] dilTIAZem CD (CARDIZEM CD)  120 MG 24 hr capsule Take 1 capsule by mouth once daily   • ketoconazole (NIZORAL) 2 % shampoo Apply  topically to the appropriate area as directed 2 (Two) Times a Week.   • triamcinolone (KENALOG) 0.1 % cream Apply  topically to the appropriate area as directed 3 (Three) Times a Day. For itching   • triamcinolone (KENALOG) 0.1 % lotion Apply  topically to the appropriate area as directed 3 (Three) Times a Day.   • [DISCONTINUED] guaiFENesin (MUCINEX) 600 MG 12 hr tablet Take 1 tablet by mouth 2 (Two) Times a Day.   • [DISCONTINUED] ibandronate (Boniva) 150 MG tablet Take 1 tablet by mouth Every 30 (Thirty) Days.   • [DISCONTINUED] meloxicam (MOBIC) 15 MG tablet Take 1 tablet by mouth Daily As Needed (pain). Take with food   • [DISCONTINUED] phenazopyridine (Pyridium) 200 MG tablet Take 1 tablet by mouth 3 (Three) Times a Day As Needed for Bladder Spasms.     No current facility-administered medications on file prior to visit.     She is allergic to crestor [rosuvastatin calcium], adhesive tape, and latex..    Outpatient Medications Prior to Visit   Medication Sig Dispense Refill   • Calcium Carbonate-Vit D-Min (CALTRATE 600+D PLUS PO) Take 1 tablet by mouth 2 (Two) Times a Day.     • CLOBETASOL PROPIONATE EMULSION EX Apply  topically.     • fluticasone (FLONASE) 50 MCG/ACT nasal spray Use 2 spray(s) in each nostril once daily 16 g 5   • loratadine (Claritin) 10 MG tablet Take 10 mg by mouth Daily.     • omeprazole (priLOSEC) 40 MG capsule TAKE 1 CAPSULE BY MOUTH ONCE DAILY IN THE MORNING FOR  REFLUX 30 capsule 11   • PARoxetine (PAXIL) 20 MG tablet TAKE 1 TABLET BY MOUTH ONCE DAILY AT BEDTIME 30 tablet 11   • phenylephrine (SUDAFED PE) 10 MG tablet 1 po 2-3 times daily prn congestion 36 tablet 0   • Psyllium (METAMUCIL FIBER PO) Take  by mouth.     • dilTIAZem CD (CARDIZEM CD) 120 MG 24 hr capsule Take 1 capsule by mouth once daily 90 capsule 0   • ketoconazole (NIZORAL) 2 % shampoo Apply  topically to the  appropriate area as directed 2 (Two) Times a Week.     • triamcinolone (KENALOG) 0.1 % cream Apply  topically to the appropriate area as directed 3 (Three) Times a Day. For itching 80 g 0   • triamcinolone (KENALOG) 0.1 % lotion Apply  topically to the appropriate area as directed 3 (Three) Times a Day. 120 mL 2   • guaiFENesin (MUCINEX) 600 MG 12 hr tablet Take 1 tablet by mouth 2 (Two) Times a Day. 14 tablet 0   • ibandronate (Boniva) 150 MG tablet Take 1 tablet by mouth Every 30 (Thirty) Days. 1 tablet 11   • meloxicam (MOBIC) 15 MG tablet Take 1 tablet by mouth Daily As Needed (pain). Take with food 30 tablet 0   • phenazopyridine (Pyridium) 200 MG tablet Take 1 tablet by mouth 3 (Three) Times a Day As Needed for Bladder Spasms. 6 tablet 0     No facility-administered medications prior to visit.       Patient Active Problem List   Diagnosis   • Rosacea   • Prolapse of vaginal vault after hysterectomy   • SVT (supraventricular tachycardia) - S/P ablation 2015   • Palpitations   • Osteopenia - spine   • Menopause   • Malaise and fatigue   • Mixed hyperlipidemia   • GERD (gastroesophageal reflux disease)   • Generalized anxiety disorder   • Jw hematuria   • Eustachian tube disorder   • Dizziness   • Diverticulitis of sigmoid colon   • Depressive disorder   • Constipation   • Angina pectoris (CMS/HCC)   • Allergic rhinitis   • Mitral valve regurgitation-moderate   • Mild diastolic dysfunction   • Nonrheumatic aortic valve insufficiency   • Varicose veins of both lower extremities   • Age-related osteoporosis without current pathological fracture   • Essential hypertension       Advanced Care Planning:  ACP discussion was held with the patient during this visit. Patient does not have an advance directive, information provided.    Review of Systems    Compared to one year ago, the patient feels her physical health is the same.  Compared to one year ago, the patient feels her mental health is the same.    Reviewed  "chart for potential of high risk medication in the elderly: yes  Reviewed chart for potential of harmful drug interactions in the elderly:yes    Objective         Vitals:    07/19/21 1006   BP: 144/70   Pulse: 72   Temp: 97 °F (36.1 °C)   TempSrc: Tympanic   Weight: 55.8 kg (123 lb)   Height: 162.6 cm (64\")   PainSc:   1   PainLoc: Shoulder  Comment: left       Body mass index is 21.11 kg/m².  Discussed the patient's BMI with her. The BMI is in the acceptable range.    Physical Exam    Lab Results   Component Value Date    TRIG 138 07/12/2021     (H) 07/12/2021     (H) 07/12/2021    VLDL 23 07/12/2021        Assessment/Plan   Medicare Risks and Personalized Health Plan  CMS Preventative Services Quick Reference  Advance Directive Discussion  Immunizations Discussed/Encouraged (specific immunizations; Shingrix )      A prescription for Shingrix is sent to [facility].  She will check co-pay prior to having the vaccine administered.   I will include hepatitis C screen with next set of labs.    The above risks/problems have been discussed with the patient.  Pertinent information has been shared with the patient in the After Visit Summary.  Follow up plans and orders are seen below in the Assessment/Plan Section.    Diagnoses and all orders for this visit:    1. Medicare annual wellness visit, subsequent (Primary)    2. Mixed hyperlipidemia  -     CBC Auto Differential; Future  -     Comprehensive Metabolic Panel; Future  -     Lipid Panel; Future    3. SVT (supraventricular tachycardia) - S/P ablation 2015  -     Comprehensive Metabolic Panel; Future  -     TSH; Future    4. Palpitations  -     TSH; Future    5. Age-related osteoporosis without current pathological fracture  -     Vitamin D 25 Hydroxy; Future    6. Essential hypertension    7. Gastroesophageal reflux disease, unspecified whether esophagitis present  -     CBC Auto Differential; Future    8. Generalized anxiety disorder    9. " Depressive disorder    Other orders  -     dilTIAZem CD (CARDIZEM CD) 180 MG 24 hr capsule; Take 1 capsule by mouth Daily.  Dispense: 90 capsule; Refill: 3  -     ondansetron (Zofran) 4 MG tablet; Take 1 tablet by mouth Every 8 (Eight) Hours As Needed for Nausea or Vomiting.  Dispense: 20 tablet; Refill: 0  -     busPIRone (BUSPAR) 10 MG tablet; Take 1 tablet by mouth 2 (two) times a day.  Dispense: 60 tablet; Refill: 11  -     Zoster Vac Recomb Adjuvanted 50 MCG/0.5ML reconstituted suspension; Inject 0.5 mL into the appropriate muscle as directed by prescriber Every 2 (Two) Months.  Dispense: 1 each; Refill: 1      Follow Up:  Return in about 1 year (around 7/19/2022) for Next scheduled follow up.     An After Visit Summary and PPPS were given to the patient.

## 2021-07-19 NOTE — PROGRESS NOTES
"Chief Complaint  Annual Exam (states when she walks sometimes will have rapid heartbeat but she sees Dr. Aguirre in October ) and Medicare Wellness-subsequent    Subjective          History of Present Illness     Supriya Alberto presents to the clinic for annual exam and follow up on chronic medical issues including hyperlipiemia,  PSVT, GERD/gastritis, osteopenia, generalized anxiety disorder, seasonal allergies, mild intermittent asthma, and dysthymia among other issues.  Patient also completed subsequent Medicare Wellness exam.  Shaun is doing a reasonable job at managing CRISTOBAL, although, she admits there is room for improvement, especially with some upcoming family gatherings in the setting of the pandemic. We discussed adding BuSpar to help with anxiety.  She is in agreement.             Dr. Aguirre performed successful pathway ablation 02/2015 and continues to follow her annually for moderate mitral valve regurgitation. She is noticing palpitations with exertion, improving with rest.  She is scheduled to see Dr. Aguirre in October.  Blood pressure is marginal today at 144/70.  Systolic BP is also running over 140 with episodic home BP monitoring, but tends to improve after rest.  I recommended increasing her dose of diltiazem.  She is in agreement.       Weight is down 3 pounds with actual body weight today compared to home weight one year ago due to telemedicine visit.              DEXA 05/2020 reveals new osteoporosis of the hip and mild osteopenia of the lumbar spine.  We started monthly Boniva 150 mg, which she stopped after one dose due to myalgias and mild leg pain.  With her visit last year, she agreed to try restarting the Boniva, although, was unable to tolerate due to \"body aches\" for 2-3 days after taking the dose of Boniva, although, she did not notify us until today.  We discussed other available options including Prolia injections twice yearly or annual IV Reclast.   She is agreeable to start annual " "Reclast and we have made the referral.     She is scheduled to see ophthalmology in [place] to follow up on abnormal eye exam.     She has been off of statins for approximately 3 year.  Her total cholesterol and LDL cholesterol are above goal, although, her excellent HDL cholesterol of 107 gives her an excellent cholesterol ratio.     She is due Shingrix and prescription is sent to [facility] today.     The patient's relevant past medical, surgical, and social history was reviewed in Epic.   Lab results are reviewed with the patient today.  CBC unremarkable. Fasting glucose 105.  Normal thyroid screen.  Normal renal and liver function.       Objective   Vital Signs:   /70   Pulse 72   Temp 97 °F (36.1 °C) (Tympanic)   Ht 162.6 cm (64\")   Wt 55.8 kg (123 lb)   BMI 21.11 kg/m²       Physical Exam  Vitals reviewed.   Constitutional:       General: She is not in acute distress.     Appearance: She is well-developed.      Comments: Pleasant female.    HENT:      Head: Normocephalic and atraumatic.      Nose:      Right Sinus: No maxillary sinus tenderness or frontal sinus tenderness.      Left Sinus: No maxillary sinus tenderness or frontal sinus tenderness.      Mouth/Throat:      Mouth: No oral lesions.      Pharynx: Uvula midline.      Tonsils: No tonsillar exudate.   Eyes:      Conjunctiva/sclera: Conjunctivae normal.      Pupils: Pupils are equal, round, and reactive to light.   Neck:      Thyroid: No thyroid mass or thyromegaly.      Vascular: No carotid bruit or JVD.      Trachea: Trachea normal. No tracheal deviation.   Cardiovascular:      Rate and Rhythm: Normal rate and regular rhythm.  No extrasystoles are present.     Chest Wall: PMI is not displaced.      Heart sounds: Normal heart sounds. No murmur heard.     Pulmonary:      Effort: Pulmonary effort is normal. No accessory muscle usage or respiratory distress.      Breath sounds: Normal breath sounds. No decreased breath sounds, " wheezing, rhonchi or rales.   Abdominal:      General: Bowel sounds are normal. There is no distension.      Palpations: Abdomen is soft.      Tenderness: There is no abdominal tenderness.   Musculoskeletal:      Cervical back: Neck supple.   Feet:      Comments: Trace edema bilateral ankles.   Lymphadenopathy:      Cervical: No cervical adenopathy.   Skin:     General: Skin is warm and dry.      Findings: No rash.      Nails: There is no clubbing.   Neurological:      Mental Status: She is alert and oriented to person, place, and time.      Cranial Nerves: No cranial nerve deficit.      Coordination: Coordination normal.   Psychiatric:         Speech: Speech normal.         Behavior: Behavior normal.         Thought Content: Thought content normal.         Judgment: Judgment normal.            Result Review :     CMP    CMP 7/12/21   Glucose 105 (A)   BUN 13   Creatinine 0.75   eGFR Non African Am 76   Sodium 140   Potassium 4.0   Chloride 105   Calcium 9.9   Albumin 4.50   Total Bilirubin 0.6   Alkaline Phosphatase 119   AST (SGOT) 23   ALT (SGPT) 11   (A) Abnormal value            CBC w/diff    CBC w/Diff 7/12/21   WBC 6.34   RBC 4.27   Hemoglobin 13.6   Hematocrit 41.0   MCV 96.0   MCH 31.9   MCHC 33.2   RDW 13.1   Platelets 315   Neutrophil Rel % 52.4   Lymphocyte Rel % 33.9   Monocyte Rel % 11.2   Eosinophil Rel % 1.7   Basophil Rel % 0.8           Lipid Panel    Lipid Panel 7/12/21   Total Cholesterol 279 (A)   Triglycerides 138   HDL Cholesterol 107 (A)   VLDL Cholesterol 23   LDL Cholesterol  149 (A)   LDL/HDL Ratio 1.35   (A) Abnormal value            TSH    TSH 7/12/21   TSH 4.060               Data reviewed: Radiologic studies DEXA 05/2020          Assessment and Plan    Diagnoses and all orders for this visit:    1. Medicare annual wellness visit, subsequent (Primary)    2. Mixed hyperlipidemia  -     CBC Auto Differential; Future  -     Comprehensive Metabolic Panel; Future  -     Lipid Panel;  Future    3. SVT (supraventricular tachycardia) - S/P ablation 2015  -     Comprehensive Metabolic Panel; Future  -     TSH; Future    4. Palpitations  -     TSH; Future    5. Age-related osteoporosis without current pathological fracture  -     Vitamin D 25 Hydroxy; Future    6. Essential hypertension    7. Gastroesophageal reflux disease, unspecified whether esophagitis present  -     CBC Auto Differential; Future    8. Generalized anxiety disorder    9. Depressive disorder    10. Encounter for hepatitis C screening test for low risk patient  -     Hepatitis C Antibody; Future    Other orders  -     dilTIAZem CD (CARDIZEM CD) 180 MG 24 hr capsule; Take 1 capsule by mouth Daily.  Dispense: 90 capsule; Refill: 3  -     ondansetron (Zofran) 4 MG tablet; Take 1 tablet by mouth Every 8 (Eight) Hours As Needed for Nausea or Vomiting.  Dispense: 20 tablet; Refill: 0  -     busPIRone (BUSPAR) 10 MG tablet; Take 1 tablet by mouth 2 (two) times a day.  Dispense: 60 tablet; Refill: 11  -     Zoster Vac Recomb Adjuvanted 50 MCG/0.5ML reconstituted suspension; Inject 0.5 mL into the appropriate muscle as directed by prescriber Every 2 (Two) Months.  Dispense: 1 each; Refill: 1         I spent 33 minutes caring for Supriya on this date of service. This time includes time spent by me in the following activities:preparing for the visit, reviewing tests, obtaining and/or reviewing a separately obtained history, performing a medically appropriate examination and/or evaluation , counseling and educating the patient/family/caregiver, ordering medications, tests, or procedures, documenting information in the medical record and independently interpreting results and communicating that information with the patient/family/caregiver .  This does not include time spent on her Medicare AWV today    Patient completes subsequent Medicare Wellness exam today.  A prescription is sent to [facility] for Shingrix.  She can check the cost of co-pay  prior to having the vaccine administered.      We will refer patient for IV annual Reclast.  Continue calcium+D.       I increased diltiazem CD from 120 mg to 180 mg to address palpitations in this patient with PSVT followed by Dr. Aguirre.  She is scheduled to see him in October.     Reviewed management plan for chronic venous insufficiency including sodium restriction, maintaining a goal weight, elevating lower extremities when not ambulating, and wearing compression stockings.      Continue the Prilosec for GERD.  She can add OTC antacids for breakthrough symptoms.       Continue Paxil for CRISTOBAL and depression.   Depression seems to be stable, but she is reporting suboptimal control of anxiety.  I sent a prescription to add Buspar 10 mg to take 1/2 tablet b.i.d. x 5 days progressing to 1 tablet b.i.d.        She can remain off statin for now and we will continue to monitor her lipids.  Her high HDL keeps her ratio excellent.      Return in one year for annual exam with fasting labs one week prior or sooner if needed.  We will screen for hepatitis C with next year's labs.    Scribed for Dr. Valiente by Samina Field [facility].     Follow Up   Return in about 1 year (around 7/19/2022) for Next scheduled follow up.  Patient was given instructions and counseling regarding her condition or for health maintenance advice. Please see specific information pulled into the AVS if appropriate.

## 2021-08-02 ENCOUNTER — INFUSION (OUTPATIENT)
Dept: ONCOLOGY | Facility: HOSPITAL | Age: 76
End: 2021-08-02

## 2021-08-02 VITALS
RESPIRATION RATE: 18 BRPM | SYSTOLIC BLOOD PRESSURE: 155 MMHG | DIASTOLIC BLOOD PRESSURE: 77 MMHG | TEMPERATURE: 96.8 F | HEART RATE: 72 BPM

## 2021-08-02 DIAGNOSIS — M81.0 AGE-RELATED OSTEOPOROSIS WITHOUT CURRENT PATHOLOGICAL FRACTURE: Primary | ICD-10-CM

## 2021-08-02 PROCEDURE — 96365 THER/PROPH/DIAG IV INF INIT: CPT | Performed by: NURSE PRACTITIONER

## 2021-08-02 PROCEDURE — 25010000002 ZOLEDRONIC ACID 5 MG/100ML SOLUTION: Performed by: INTERNAL MEDICINE

## 2021-08-02 RX ORDER — SODIUM CHLORIDE 9 MG/ML
250 INJECTION, SOLUTION INTRAVENOUS ONCE
Status: COMPLETED | OUTPATIENT
Start: 2021-08-02 | End: 2021-08-02

## 2021-08-02 RX ORDER — ZOLEDRONIC ACID 5 MG/100ML
5 INJECTION, SOLUTION INTRAVENOUS ONCE
Status: COMPLETED | OUTPATIENT
Start: 2021-08-02 | End: 2021-08-02

## 2021-08-02 RX ORDER — SODIUM CHLORIDE 9 MG/ML
250 INJECTION, SOLUTION INTRAVENOUS ONCE
Status: CANCELLED | OUTPATIENT
Start: 2022-08-02

## 2021-08-02 RX ORDER — ZOLEDRONIC ACID 5 MG/100ML
5 INJECTION, SOLUTION INTRAVENOUS ONCE
Status: CANCELLED | OUTPATIENT
Start: 2022-08-02

## 2021-08-02 RX ADMIN — ZOLEDRONIC ACID 5 MG: 5 INJECTION, SOLUTION INTRAVENOUS at 10:43

## 2021-08-02 RX ADMIN — SODIUM CHLORIDE 250 ML: 9 INJECTION, SOLUTION INTRAVENOUS at 10:43

## 2021-08-10 ENCOUNTER — OFFICE VISIT (OUTPATIENT)
Dept: FAMILY MEDICINE CLINIC | Facility: CLINIC | Age: 76
End: 2021-08-10

## 2021-08-10 VITALS
HEIGHT: 64 IN | HEART RATE: 72 BPM | BODY MASS INDEX: 21 KG/M2 | SYSTOLIC BLOOD PRESSURE: 148 MMHG | DIASTOLIC BLOOD PRESSURE: 70 MMHG | WEIGHT: 123 LBS

## 2021-08-10 DIAGNOSIS — M47.22 OSTEOARTHRITIS OF SPINE WITH RADICULOPATHY, CERVICAL REGION: ICD-10-CM

## 2021-08-10 DIAGNOSIS — M62.838 MUSCLE SPASMS OF NECK: ICD-10-CM

## 2021-08-10 DIAGNOSIS — K21.9 GASTROESOPHAGEAL REFLUX DISEASE, UNSPECIFIED WHETHER ESOPHAGITIS PRESENT: Chronic | ICD-10-CM

## 2021-08-10 DIAGNOSIS — M25.50 ARTHRALGIA, UNSPECIFIED JOINT: ICD-10-CM

## 2021-08-10 DIAGNOSIS — M79.10 MYALGIA: ICD-10-CM

## 2021-08-10 DIAGNOSIS — T50.905A MEDICATION SIDE EFFECT, INITIAL ENCOUNTER: Primary | ICD-10-CM

## 2021-08-10 PROCEDURE — G2025 DIS SITE TELE SVCS RHC/FQHC: HCPCS | Performed by: INTERNAL MEDICINE

## 2021-08-10 RX ORDER — CELECOXIB 200 MG/1
200 CAPSULE ORAL DAILY
Qty: 10 CAPSULE | Refills: 0 | Status: SHIPPED | OUTPATIENT
Start: 2021-08-10 | End: 2022-04-05

## 2021-08-10 RX ORDER — TIZANIDINE 4 MG/1
TABLET ORAL
Qty: 15 TABLET | Refills: 0 | Status: SHIPPED | OUTPATIENT
Start: 2021-08-10 | End: 2022-07-26

## 2021-08-10 NOTE — PROGRESS NOTES
You have chosen to receive care through a telephone visit. Do you consent to use a telephone visit for your medical care today? Yes    Total visit time: 21 min    Chief Complaint  Pain (patient recieved IV Reclast 08/02 and since has had bone pain, fever chills, aching all over and nausea. She went to [facility] and Covid test was negative. She feels much better now but right arm where she had infusion has a lot of pain and keeps her up at night. Denies redness or swelling in that hand. She feels like its a muscle )    Subjective          History of Present Illness     Supriya Alberto receives care via telephone describing myalgias/arthralgias, fever, chills, and nausea after receiving IV Reclast 08/02/2021.  She reports tight muscles in her cervical spine. She was seen at [facility] and had negative COVID testing.  Pain has started to improve, although, she states the right arm/shoulder where she had the Reclast infusion remains painful with the pain extending down to her fingers.  She is taking 2-4 Tylenol daily for the past few days for pain relief.  She avoids NSAIDs due to GERD.  She reports her GERD symptoms are currently well controlled with omeprazole 40 mg every morning.           DEXA 05/2020 revealed new osteoporosis of the hip and mild osteopenia of the lumbar spine.  We started monthly Boniva 150 mg, which she stopped after one dose due to myalgias and mild leg pain.    This year, we started annual IV Reclast.   We will be repeating DEXA 05/2022.  I told her that if her repeat DEXA next year still indicates prescription medication treatment is necessary, we will try Prolia injections, which are usually tolerated very well.      She is also describing cervical DJD with right radicular symptoms and impressive muscle spasm of the muscles of the right side of the neck and upper back.  This all started at approximately the same time.  It is unclear if the IV Reclast infusion precipitated this or if it was just  "coincidence.      Objective   Vital Signs:   /70   Pulse 72   Ht 162.6 cm (64\")   Wt 55.8 kg (123 lb)   BMI 21.11 kg/m²       Physical Exam   Result Review :     CMP    CMP 7/12/21   Glucose 105 (A)   BUN 13   Creatinine 0.75   eGFR Non African Am 76   Sodium 140   Potassium 4.0   Chloride 105   Calcium 9.9   Albumin 4.50   Total Bilirubin 0.6   Alkaline Phosphatase 119   AST (SGOT) 23   ALT (SGPT) 11   (A) Abnormal value            CBC w/diff    CBC w/Diff 7/12/21   WBC 6.34   RBC 4.27   Hemoglobin 13.6   Hematocrit 41.0   MCV 96.0   MCH 31.9   MCHC 33.2   RDW 13.1   Platelets 315   Neutrophil Rel % 52.4   Lymphocyte Rel % 33.9   Monocyte Rel % 11.2   Eosinophil Rel % 1.7   Basophil Rel % 0.8               Data reviewed: Radiologic studies DEXA 2020       Future Appointments   Date Time Provider Department Center   10/26/2021 11:15 AM Natalia Aguirre MD [facility] None   7/26/2022 10:00 AM Feliz Valiente MD [facility] P[facility]   8/3/2022  9:00 AM  [facility] OP INFU CHAIR 12 [facility] OPI MAD        Assessment and Plan    Diagnoses and all orders for this visit:    1. Medication side effect, initial encounter (Primary)    2. Myalgia    3. Arthralgia, unspecified joint    4. Gastroesophageal reflux disease, unspecified whether esophagitis present    5. Osteoarthritis of spine with radiculopathy, cervical region    6. Muscle spasms of neck    Other orders  -     tiZANidine (ZANAFLEX) 4 MG tablet; 1 qhs prn muscle spasms  Dispense: 15 tablet; Refill: 0  -     celecoxib (CeleBREX) 200 MG capsule; Take 1 capsule by mouth Daily. With food  Dispense: 10 capsule; Refill: 0           I sent prescriptions for Celebrex 200 mg to take one q.d. with food and Zanaflex 4 mg to take 1 q.h.s. prn muscle spasms for the next 1 to 2 weeks.  Continue the deep muscle massage to the posterior neck muscles  to help relieve tightness /muscle spasm.  Watch closely for any GI intolerance while on the Celebrex.  Continue to use Tylenol per " label directions for additional pain relief as needed    She will be due repeat DEXA 05/2022.  We will list intolerance with the Reclast and plan on trying Prolia pending DEXA results next spring.     Continue the Prilosec 40 mg q.a.m. for GERD and GI protection while on the Celebrex.  GERD symptoms are currently well controlled.  She may take an dose of omeprazole at night if needed, while on the Celebrex.     Return 07/2022 for annual exam with fasting labs one week prior or sooner if needed.   Notify us next week if symptoms are not improving.     Scribed for Dr. Valiente by [facility].     Follow Up {Instructions Charge Capture  Follow-up Communications :23}  Return if symptoms worsen or fail to improve, for Next scheduled follow up, Next scheduled follow up - labs 1 week prior.  Patient was given instructions and counseling regarding her condition or for health maintenance advice. Please see specific information pulled into the AVS if appropriate.

## 2021-09-15 ENCOUNTER — LAB (OUTPATIENT)
Dept: LAB | Facility: OTHER | Age: 76
End: 2021-09-15

## 2021-09-15 ENCOUNTER — OFFICE VISIT (OUTPATIENT)
Dept: FAMILY MEDICINE CLINIC | Facility: CLINIC | Age: 76
End: 2021-09-15

## 2021-09-15 VITALS — BODY MASS INDEX: 21 KG/M2 | HEIGHT: 64 IN | WEIGHT: 123 LBS

## 2021-09-15 DIAGNOSIS — N30.00 ACUTE CYSTITIS WITHOUT HEMATURIA: Primary | ICD-10-CM

## 2021-09-15 DIAGNOSIS — R30.0 DYSURIA: Primary | ICD-10-CM

## 2021-09-15 LAB
BACTERIA UR QL AUTO: ABNORMAL /HPF
BILIRUB UR QL STRIP: NEGATIVE
CLARITY UR: ABNORMAL
COLOR UR: YELLOW
GLUCOSE UR STRIP-MCNC: NEGATIVE MG/DL
HGB UR QL STRIP.AUTO: ABNORMAL
HYALINE CASTS UR QL AUTO: ABNORMAL /LPF
KETONES UR QL STRIP: NEGATIVE
LEUKOCYTE ESTERASE UR QL STRIP.AUTO: ABNORMAL
NITRITE UR QL STRIP: NEGATIVE
PH UR STRIP.AUTO: 5.5 [PH] (ref 5.5–8)
PROT UR QL STRIP: NEGATIVE
RBC # UR: ABNORMAL /HPF
REF LAB TEST METHOD: ABNORMAL
RENAL EPI CELLS #/AREA URNS HPF: ABNORMAL /HPF
SP GR UR STRIP: <=1.005 (ref 1–1.03)
SQUAMOUS #/AREA URNS HPF: ABNORMAL /HPF
UROBILINOGEN UR QL STRIP: ABNORMAL
WBC UR QL AUTO: ABNORMAL /HPF

## 2021-09-15 PROCEDURE — 81001 URINALYSIS AUTO W/SCOPE: CPT | Performed by: INTERNAL MEDICINE

## 2021-09-15 PROCEDURE — G2025 DIS SITE TELE SVCS RHC/FQHC: HCPCS | Performed by: INTERNAL MEDICINE

## 2021-09-15 PROCEDURE — 87086 URINE CULTURE/COLONY COUNT: CPT | Performed by: INTERNAL MEDICINE

## 2021-09-15 RX ORDER — CEFUROXIME AXETIL 250 MG/1
250 TABLET ORAL 2 TIMES DAILY
Qty: 14 TABLET | Refills: 0 | Status: SHIPPED | OUTPATIENT
Start: 2021-09-15 | End: 2021-09-22

## 2021-09-15 NOTE — PROGRESS NOTES
"You have chosen to receive care through a telephone visit. Do you consent to use a telephone visit for your medical care today? Yes    Chief Complaint  Difficulty Urinating (burning x 2 days and worse today)    Subjective          History of Present Illness     Supriya Alberto receives care via telephone visit reporting onset of urinary frequency four days ago progressing to burning with urination three days ago, which worsened this morning.  Denies bladder spasms.  UA this morning reveals acute UTI with TMTC WBCs, 13-20 RBCs, and 1+ bacteria. We treated UTI 10/2020 with 7-day course of Ceftin 250 mg.  Urine culture 10/2020 grew gram negative Bacilli     Objective   Vital Signs:   Ht 162.6 cm (64\")   Wt 55.8 kg (123 lb)   BMI 21.11 kg/m²         Physical Exam     Result Review :     UA    Urinalysis 10/2/20 10/2/20 9/15/21 9/15/21    1000 1000 0956 0956   Squamous Epithelial Cells, UA  3-6 (A)  3-6 (A)   Specific Gravity, UA 1.010  <=1.005    Ketones, UA Negative  Negative    Blood, UA Moderate (2+) (A)  Large (3+) (A)    Leukocytes, UA Moderate (2+) (A)  Moderate (2+) (A)    Nitrite, UA Negative  Negative    RBC, UA  3-5 (A)  13-20 (A)   WBC, UA  21-30 (A)  Too Numerous to Count (A)   Bacteria, UA  Trace (A)  1+ (A)   (A) Abnormal value                   Future Appointments   Date Time Provider Department Center   10/26/2021 11:15 AM Natalia Aguirre MD [facility] None   7/26/2022 10:00 AM Feliz Valiente MD [facility] POW [facility]   8/3/2022  9:00 AM  M[facility] OP INFU CHAIR 12  M[facility] OPI MAD        Assessment and Plan    There are no diagnoses linked to this encounter.     I spent 14 minutes caring for Supriya on this date of service. This time includes time spent by me in the following activities:preparing for the visit, reviewing tests, obtaining and/or reviewing a separately obtained history, performing a medically appropriate examination and/or evaluation , counseling and educating the patient/family/caregiver, ordering " medications, tests, or procedures, documenting information in the medical record and independently interpreting results and communicating that information with the patient/family/caregiver     To treat acute cystitis, I sent a prescription for Ceftin 250 mg to take one b.i.d. x 7 days.  Drink plenty of water. We will review urine culture results when available.     Return 07/26/2022 for routine annual follow up with fasting labs one week prior or sooner if needed.     Scribed for Dr. Valiente by [facility].     Follow Up   Return for Next scheduled follow up.  Patient was given instructions and counseling regarding her condition or for health maintenance advice. Please see specific information pulled into the AVS if appropriate.

## 2021-09-16 LAB — BACTERIA SPEC AEROBE CULT: ABNORMAL

## 2021-11-30 ENCOUNTER — OFFICE VISIT (OUTPATIENT)
Dept: CARDIOLOGY | Facility: CLINIC | Age: 76
End: 2021-11-30

## 2021-11-30 VITALS
HEIGHT: 64 IN | SYSTOLIC BLOOD PRESSURE: 134 MMHG | DIASTOLIC BLOOD PRESSURE: 68 MMHG | WEIGHT: 127 LBS | BODY MASS INDEX: 21.68 KG/M2 | OXYGEN SATURATION: 96 % | HEART RATE: 66 BPM

## 2021-11-30 DIAGNOSIS — I10 ESSENTIAL HYPERTENSION: Chronic | ICD-10-CM

## 2021-11-30 DIAGNOSIS — I35.1 NONRHEUMATIC AORTIC VALVE INSUFFICIENCY: ICD-10-CM

## 2021-11-30 DIAGNOSIS — I34.0 NONRHEUMATIC MITRAL VALVE REGURGITATION: Chronic | ICD-10-CM

## 2021-11-30 DIAGNOSIS — E78.2 MIXED HYPERLIPIDEMIA: Chronic | ICD-10-CM

## 2021-11-30 DIAGNOSIS — I47.1 SVT (SUPRAVENTRICULAR TACHYCARDIA) (HCC): Primary | ICD-10-CM

## 2021-11-30 LAB
QT INTERVAL: 402 MS
QTC INTERVAL: 421 MS

## 2021-11-30 PROCEDURE — 93000 ELECTROCARDIOGRAM COMPLETE: CPT | Performed by: INTERNAL MEDICINE

## 2021-11-30 PROCEDURE — 99213 OFFICE O/P EST LOW 20 MIN: CPT | Performed by: INTERNAL MEDICINE

## 2021-11-30 NOTE — PROGRESS NOTES
Supriya Alberto  76 y.o. female    11/30/2021     1. SVT (supraventricular tachycardia) (formerly [facility])    2. Mixed hyperlipidemia    3. Essential hypertension    4. Nonrheumatic aortic valve insufficiency    5. Nonrheumatic mitral valve regurgitation        History of Present Illness:    Patient's Body mass index is 21.8 kg/m². BMI is within normal parameters. No follow-up required.   .  76 years old patient with history of supraventricular tachycardia underwent EP study and SVT ablation.  Postprocedure she did very well have brief episode of palpitation from clinical mechanism probably sinus tachycardia.   she is doing remarkably well on diltiazem she is a pleased with her clinical outcome.  No further palpitation reported.  The patient denies orthopnea PND chest pain or intermittent claudication.  She remains physically active.  She does a history of chest pain status post cardiac catheterization no significant CAD noted.  EKG in the office sinus rhythm with mildly prolonged FL interval no acute ST-T wave changes.  Findings discussed with the patient.  Previous echo in 2019 have mild aortic and moderate mitral regurgitation.  We will arrange an echo prior to the next visit to reassess the regurgitant status...       Echo 10/25/2019  · Estimated EF = 61%.  · Left ventricular systolic function is normal.  · Left ventricular diastolic dysfunction (grade I) consistent with impaired relaxation.  · Mild aortic valve regurgitation is present.  · Moderate mitral valve regurgitation is present  · Mild tricuspid valve regurgitation is present.      3/2019     Total Cholesterol 150 - 200 mg/dL 253 Abnormally high     Triglycerides <=150 mg/dL 118    HDL Cholesterol 40 - 59 mg/dL 121 Abnormally high     LDL Cholesterol  <=100 mg/dL 108 Abnormally high     VLDL Cholesterol mg/dL 23.6    LDL/HDL Ratio 0.00 - 3.22 0.90             3/2018  Total Cholesterol 150 - 200 mg/dL 268     Triglycerides 35 - 160 mg/dL 103    HDL Cholesterol 35 -  100 mg/dL 114     LDL Cholesterol  mg/dL 133    VLDL Cholesterol mg/dL 20.6    LDL/HDL Ratio   1.17         ECHO 4/17/2017  · Left ventricular function is normal. Estimated EF = 60%.  · Left ventricular diastolic dysfunction (grade I) consistent with impaired relaxation.  · Mild aortic valve regurgitation is present.  · Moderate mitral valve regurgitation is present  · Mild tricuspid valve regurgitation is present        SUBJECTIVE:    Allergies   Allergen Reactions   • Reclast [Zoledronic Acid] Myalgia     Bone pain, fever, chills, nausea, flulike aches and pains   • Crestor [Rosuvastatin Calcium] Myalgia   • Adhesive Tape Rash   • Latex Rash         Past Medical History:   Diagnosis Date   • Age-related osteoporosis without current pathological fracture 7/14/2020   • Allergic rhinitis    • Angina pectoris (HCC)    • Chronic seasonal allergic rhinitis due to pollen    • Constipation    • Depressive disorder    • Diverticulitis of sigmoid colon    • Dizziness    • Essential hypertension 7/19/2021   • Eustachian tube disorder    • Jw hematuria    • Generalized anxiety disorder    • GERD (gastroesophageal reflux disease)    • Hyperlipidemia    • Malaise and fatigue    • Menopause    • Mild diastolic dysfunction    • Mitral valve regurgitation-moderate    • Mixed hyperlipidemia    • Osteopenia    • Palpitations    • Paroxysmal supraventricular tachycardia (HCC)    • Prolapse of vaginal vault after hysterectomy    • Rosacea    • SVT (supraventricular tachycardia) - S/P ablation 2015    • Syncope          Past Surgical History:   Procedure Laterality Date   • CARDIAC ABLATION  02/2015    SVT pathway ablation, Dr. Aguirre   • CARDIAC CATHETERIZATION  11/12/2014    Normal coronaries. Normal LV systolic function   • COLPOPEXY VAGINAL  01/28/2013    laparoscopic uterisacral colpopexy (intraperitoneal) Lysis of adhesions (took 1.5 hrs of 2.5 hrs spent laparoscopically ) Tension free vaginal tape midurethral sling Posterior  colporrhaphy. Perineorrhaphy. Cystourethroscopy   • ENDOSCOPY AND COLONOSCOPY  04/2014    No polyps. Minimal diverticulosis (sigmoid). Dr Louise   • HEMORRHOIDECTOMY  2005   • HYSTERECTOMY     • OOPHORECTOMY           Family History   Problem Relation Age of Onset   • Lung cancer Father    • Heart disease Other          Social History     Socioeconomic History   • Marital status:    Tobacco Use   • Smoking status: Never Smoker   • Smokeless tobacco: Never Used   Substance and Sexual Activity   • Alcohol use: No   • Drug use: No   • Sexual activity: Defer         Current Outpatient Medications   Medication Sig Dispense Refill   • busPIRone (BUSPAR) 10 MG tablet Take 1 tablet by mouth 2 (two) times a day. 60 tablet 11   • Calcium Carbonate-Vit D-Min (CALTRATE 600+D PLUS PO) Take 1 tablet by mouth 2 (Two) Times a Day.     • celecoxib (CeleBREX) 200 MG capsule Take 1 capsule by mouth Daily. With food 10 capsule 0   • CLOBETASOL PROPIONATE EMULSION EX Apply  topically.     • dilTIAZem CD (CARDIZEM CD) 180 MG 24 hr capsule Take 1 capsule by mouth Daily. 90 capsule 3   • fluticasone (FLONASE) 50 MCG/ACT nasal spray Use 2 spray(s) in each nostril once daily 16 g 5   • ketoconazole (NIZORAL) 2 % shampoo Apply  topically to the appropriate area as directed 2 (Two) Times a Week.     • loratadine (Claritin) 10 MG tablet Take 10 mg by mouth Daily.     • omeprazole (priLOSEC) 40 MG capsule TAKE 1 CAPSULE BY MOUTH ONCE DAILY IN THE MORNING FOR  REFLUX 30 capsule 11   • ondansetron (Zofran) 4 MG tablet Take 1 tablet by mouth Every 8 (Eight) Hours As Needed for Nausea or Vomiting. 20 tablet 0   • PARoxetine (PAXIL) 20 MG tablet TAKE 1 TABLET BY MOUTH ONCE DAILY AT BEDTIME 30 tablet 11   • phenylephrine (SUDAFED PE) 10 MG tablet 1 po 2-3 times daily prn congestion 36 tablet 0   • Psyllium (METAMUCIL FIBER PO) Take  by mouth.     • tiZANidine (ZANAFLEX) 4 MG tablet 1 qhs prn muscle spasms 15 tablet 0   • triamcinolone  "(KENALOG) 0.1 % cream Apply  topically to the appropriate area as directed 3 (Three) Times a Day. For itching 80 g 0   • triamcinolone (KENALOG) 0.1 % lotion Apply  topically to the appropriate area as directed 3 (Three) Times a Day. 120 mL 2   • Zoster Vac Recomb Adjuvanted 50 MCG/0.5ML reconstituted suspension Inject 0.5 mL into the appropriate muscle as directed by prescriber Every 2 (Two) Months. 1 each 1     No current facility-administered medications for this visit.           Review of Systems:     Constitutional:  Denies recent weight loss, weight gain,no change in exercise tolerance.     HENT:  Denies any hearing loss, epistaxis, hoarseness  Eyes: No blurred  Respiratory:  Denies dyspnea with exertion,no cough,     Cardiovascular: See H&P    Gastrointestinal: Gastroesophageal reflux disease     Endocrine: Negative for cold intolerance, heat intolerance, polydipsia, polyphagia and polyuria.     Genitourinary: Negative.      Musculoskeletal: Positive for osteoarthritis    Skin:  Denies rashes, or skin lesions.     Allergic/Immunologic: Negative.  Negative for environmental allergies,    Neurological:  Denies any history of recurrent headaches, strokes,    Hematological: Denies any food allergies, seasonal allergies, bleeding disorders,    Psychiatric/Behavioral: Denies any history of depression      OBJECTIVE:    /68 (BP Location: Left arm, Patient Position: Sitting, Cuff Size: Adult)   Pulse 66   Ht 162.6 cm (64\")   Wt 57.6 kg (127 lb)   SpO2 96%   BMI 21.80 kg/m²       Physical Exam:     Constitutional: Cooperative, alert and oriented, well-developed, well-nourished, in no acute distress.     HENT:   Head: Normocephalic conjunctive is pink thyroid is nonpalpable no jugular is distention    Cardiovascular: Regular rhythm, S1 and S2 normal, no S3 or S4. Apical impulse not displaced.  Positive murmur at the base of the heart    Pulmonary/Chest: Chest: No rales no wheezing good bilateral air " entry    Abdominal: Abdomen soft, bowel sounds normoactive, no masses,    Musculoskeletal: Straight peripheral pulses normal d.     Neurological: No gross motor or sensory deficits noted, affect appropriate, oriented to time, person, place.     Skin: Warm and dry to the touch, no apparent skin lesions or masses noted.     Psychiatric: She has a normal mood and affect. Her behavior is normal. Judgment and thought content normal.         Procedures      Lab Results   Component Value Date    WBC 6.34 07/12/2021    HGB 13.6 07/12/2021    HCT 41.0 07/12/2021    MCV 96.0 07/12/2021     07/12/2021     Lab Results   Component Value Date    GLUCOSE 105 (H) 07/12/2021    BUN 13 07/12/2021    CREATININE 0.75 07/12/2021    EGFRIFNONA 76 07/12/2021    BCR 17.3 07/12/2021    CO2 31.0 (H) 07/12/2021    CALCIUM 9.9 07/12/2021    ALBUMIN 4.50 07/12/2021    AST 23 07/12/2021    ALT 11 07/12/2021     Lab Results   Component Value Date    CHOL 279 (H) 07/12/2021    CHOL 246 (H) 03/20/2020    CHOL 253 (H) 03/18/2019     Lab Results   Component Value Date    TRIG 138 07/12/2021    TRIG 84 03/20/2020    TRIG 118 03/18/2019     Lab Results   Component Value Date     (H) 07/12/2021     (H) 03/20/2020     (H) 03/18/2019     No components found for: LDLCALC  Lab Results   Component Value Date     (H) 07/12/2021     (H) 03/20/2020     (H) 03/18/2019     No results found for: HDLLDLRATIO  No components found for: CHOLHDL  No results found for: HGBA1C  Lab Results   Component Value Date    TSH 4.060 07/12/2021           ASSESSMENT AND PLAN:    #1 supraventricular tachycardia status post EP study and ablation continue diltiazem    Patient history of supraventricular tachycardia s/p EP study ablation pleased with clinical outcome.  No further recurrence.       #2 moderate mitral, mild aortic and mild tricuspid regurgitations.    Clinically there is no progression of the regurgitant process.  Will  reassess valvular status with the periodic echocardiogram and will arrange an echo next year.        #3 history of chest pain atypical with a normal cardia catheterizations     Hyperlipidemia continue statin      Diagnoses and all orders for this visit:    1. SVT (supraventricular tachycardia) (HCC) (Primary)  -     ECG 12 Lead    2. Mixed hyperlipidemia    3. Essential hypertension    4. Nonrheumatic aortic valve insufficiency    5. Nonrheumatic mitral valve regurgitation          Natalia Aguirre MD  11/30/2021  09:58 CST

## 2022-02-28 DIAGNOSIS — M81.0 AGE-RELATED OSTEOPOROSIS WITHOUT CURRENT PATHOLOGICAL FRACTURE: Primary | ICD-10-CM

## 2022-02-28 DIAGNOSIS — N95.1 POST MENOPAUSAL SYNDROME: ICD-10-CM

## 2022-03-30 DIAGNOSIS — J01.00 ACUTE NON-RECURRENT MAXILLARY SINUSITIS: ICD-10-CM

## 2022-03-31 RX ORDER — FLUTICASONE PROPIONATE 50 MCG
SPRAY, SUSPENSION (ML) NASAL
Qty: 16 G | Refills: 0 | Status: SHIPPED | OUTPATIENT
Start: 2022-03-31 | End: 2022-08-05

## 2022-04-05 ENCOUNTER — OFFICE VISIT (OUTPATIENT)
Dept: FAMILY MEDICINE CLINIC | Facility: CLINIC | Age: 77
End: 2022-04-05

## 2022-04-05 VITALS
WEIGHT: 123.6 LBS | SYSTOLIC BLOOD PRESSURE: 126 MMHG | HEART RATE: 64 BPM | DIASTOLIC BLOOD PRESSURE: 70 MMHG | HEIGHT: 64 IN | TEMPERATURE: 97.5 F | BODY MASS INDEX: 21.1 KG/M2

## 2022-04-05 DIAGNOSIS — M75.22 TENDONITIS OF UPPER BICEPS TENDON OF LEFT SHOULDER: Primary | ICD-10-CM

## 2022-04-05 DIAGNOSIS — K21.9 GASTROESOPHAGEAL REFLUX DISEASE, UNSPECIFIED WHETHER ESOPHAGITIS PRESENT: Chronic | ICD-10-CM

## 2022-04-05 DIAGNOSIS — M79.622 LEFT UPPER ARM PAIN: ICD-10-CM

## 2022-04-05 DIAGNOSIS — M62.838 MUSCLE SPASMS OF NECK: ICD-10-CM

## 2022-04-05 DIAGNOSIS — M25.512 CHRONIC LEFT SHOULDER PAIN: Chronic | ICD-10-CM

## 2022-04-05 DIAGNOSIS — I10 ESSENTIAL HYPERTENSION: Chronic | ICD-10-CM

## 2022-04-05 DIAGNOSIS — G89.29 CHRONIC LEFT SHOULDER PAIN: Chronic | ICD-10-CM

## 2022-04-05 DIAGNOSIS — M19.012 PRIMARY OSTEOARTHRITIS OF LEFT SHOULDER: Chronic | ICD-10-CM

## 2022-04-05 PROCEDURE — 96372 THER/PROPH/DIAG INJ SC/IM: CPT | Performed by: INTERNAL MEDICINE

## 2022-04-05 PROCEDURE — 99214 OFFICE O/P EST MOD 30 MIN: CPT | Performed by: INTERNAL MEDICINE

## 2022-04-05 RX ORDER — PREDNISONE 10 MG/1
10 TABLET ORAL DAILY
Qty: 10 TABLET | Refills: 0 | Status: SHIPPED | OUTPATIENT
Start: 2022-04-05 | End: 2022-07-26

## 2022-04-05 RX ORDER — CELECOXIB 100 MG/1
100 CAPSULE ORAL DAILY
Qty: 10 CAPSULE | Refills: 0 | Status: SHIPPED | OUTPATIENT
Start: 2022-04-05

## 2022-04-05 RX ORDER — TRIAMCINOLONE ACETONIDE 40 MG/ML
20 INJECTION, SUSPENSION INTRA-ARTICULAR; INTRAMUSCULAR ONCE
Status: COMPLETED | OUTPATIENT
Start: 2022-04-05 | End: 2022-04-05

## 2022-04-05 RX ADMIN — TRIAMCINOLONE ACETONIDE 20 MG: 40 INJECTION, SUSPENSION INTRA-ARTICULAR; INTRAMUSCULAR at 12:39

## 2022-04-05 NOTE — PROGRESS NOTES
"Chief Complaint  Arm Pain (Left arm x 1-2 months. She states she helped her  change a light and held her arms overhead for a long time. She has tried Tylenol OTC) and Injections (Kenolog 20 mg given IM in left deltoid and tolerated procedure well )    Subjective          History of Present Illness     Supriya Alberto presents to the office reporting left arm/shoulder pain for the past 1-2 months, which started after she helped her  changed a light with her arms extended overhead for a lengthy period of time.  She is describing left upper extremity pain near the insertion site of proximal biceps tendons and distal insertion sites of deltoid tendons with muscle tenderness from left elbow to the shoulder, which is limiting her range of motion. She has not gotten significant relief with OTC Tylenol.  Interestingly, she reports that she must limit the amount of Tylenol she takes, as higher amount of Tylenol cause GI upset.  She continues on Prilosec each morning with OTC antacids for breakthrough GERD symptoms.  She denies dysphagia.    She also has impressive painful muscle spasm of the left posterior neck muscles including the sternocleidomastoid and left cervical paraspinal muscles    Her blood pressure is well controlled today.  Her weight is at goal.          Objective   Vital Signs:   /70   Pulse 64   Temp 97.5 °F (36.4 °C) (Tympanic)   Ht 162.6 cm (64\")   Wt 56.1 kg (123 lb 9.6 oz)   BMI 21.22 kg/m²     BMI is within normal parameters. No follow-up required.      Physical Exam  Constitutional:       General: She is not in acute distress.     Appearance: She is well-developed.      Comments: Pleasant female.    HENT:      Head: Normocephalic and atraumatic.      Nose: Nose normal.      Mouth/Throat:      Pharynx: No oropharyngeal exudate.   Eyes:      Pupils: Pupils are equal, round, and reactive to light.   Neck:      Thyroid: No thyromegaly.      Vascular: No JVD.   Cardiovascular:      " Rate and Rhythm: Normal rate and regular rhythm.      Heart sounds: Normal heart sounds.   Pulmonary:      Effort: Pulmonary effort is normal. No accessory muscle usage or respiratory distress.      Breath sounds: Normal breath sounds. No wheezing or rales.   Abdominal:      General: Bowel sounds are normal. There is no distension.      Palpations: Abdomen is soft.      Tenderness: There is no abdominal tenderness.   Musculoskeletal:      Cervical back: Neck supple.      Comments: Pain and tenderness left upper extremity near the insertion site of proximal biceps and distal insertion sites of deltoids with muscle tenderness from left elbow to the shoulder   Lymphadenopathy:      Cervical: No cervical adenopathy.   Neurological:      Mental Status: She is alert and oriented to person, place, and time.      Cranial Nerves: No cranial nerve deficit.   Psychiatric:         Speech: Speech normal.         Behavior: Behavior normal.         Thought Content: Thought content normal.         Judgment: Judgment normal.            Result Review :     Renal Profile    Renal Profile 7/12/21   BUN 13   Creatinine 0.75   eGFR Non African Am 76               Data reviewed: Radiologic studies Left shoulder x-rays       Future Appointments   Date Time Provider Department Center   6/1/2022 10:00 AM [facility] PWD DEXA 1 [facility] DEXA POW [place]   7/26/2022 10:00 AM Feliz Valiente MD [facility] PC POW [facility]   8/3/2022  9:00 AM  [facility] OP INFU CHAIR 12  [facility] OPI [facility]   9/20/2022 10:00 AM [facility]AD HVC ECHO ROOM 2 [facility] HVC CARD [place]   11/30/2022  9:30 AM Natalia Aguirre MD [facility] CD [facility] None        Assessment and Plan    Diagnoses and all orders for this visit:    1. Tendonitis of upper biceps tendon of left shoulder (Primary)  -     triamcinolone acetonide (KENALOG-40) injection 20 mg    2. Left upper arm pain  -     XR Shoulder 2+ View Left    3. Chronic left shoulder pain  -     XR Shoulder 2+ View Left  -     triamcinolone acetonide (KENALOG-40) injection 20  mg    4. Muscle spasms of neck    5. Gastroesophageal reflux disease, unspecified whether esophagitis present    6. Essential hypertension    Other orders  -     predniSONE (DELTASONE) 10 MG tablet; Take 1 tablet by mouth Daily.  Dispense: 10 tablet; Refill: 0  -     celecoxib (CeleBREX) 100 MG capsule; Take 1 capsule by mouth Daily. With food  Dispense: 10 capsule; Refill: 0      I spent 32 minutes caring for Supriya on this date of service. This time includes time spent by me in the following activities:preparing for the visit, reviewing tests, obtaining and/or reviewing a separately obtained history, performing a medically appropriate examination and/or evaluation , counseling and educating the patient/family/caregiver, ordering medications, tests, or procedures and documenting information in the medical record     Orders placed for patient to stop by for x-rays of the left shoulder.  I have reviewed the shoulder x-rays and they demonstrate arthritic and degenerative changes.  Radiologist reading is pending.  After informed verbal consent, patient is given Kenalog 20 mg.  I sent a prescription for Celebrex 100 mg q.d. with food and prednisone 10 mg to take 1 q.d. x 10 days.  Add Tylenol per label directions as tolerated for additional pain relief if needed.  Continue the Prilosec 40 mg every morning and OTC antacids to manage GERD symptoms.  I spent an extensive amount of time demonstrating home exercises and stretching to improve range of motion, as well as deep muscle massage to repeat several times daily to help relieve upper arm muscle pain and posterior neck/cervical muscle spasm.. She can apply warm moist heat for 15 to 20 minutes episodically to help alleviate tight muscles.  I emphasized HEP for improved ROM of the shoulder joint    Continue diltiazem CD for hypertension and heart rate control.  Blood pressure is well controlled today.    Continue omeprazole 40 mg every morning for GERD symptoms which are  relatively stable, but may be aggravated by the above medicines.  She understands that she may need to hold the Celebrex and/or prednisone if they produce GI upset.    Return in July for routine follow up with fasting labs one week prior or sooner if needed.     Scribed for Dr. Valiente by Samina Field [facility].     Follow Up   Return if symptoms worsen or fail to improve, for Next scheduled follow up.  Patient was given instructions and counseling regarding her condition or for health maintenance advice. Please see specific information pulled into the AVS if appropriate.

## 2022-06-14 DIAGNOSIS — Z12.31 ENCOUNTER FOR SCREENING MAMMOGRAM FOR MALIGNANT NEOPLASM OF BREAST: Primary | ICD-10-CM

## 2022-07-11 RX ORDER — PAROXETINE HYDROCHLORIDE 20 MG/1
TABLET, FILM COATED ORAL
Qty: 90 TABLET | Refills: 1 | Status: SHIPPED | OUTPATIENT
Start: 2022-07-11 | End: 2023-01-11

## 2022-07-11 RX ORDER — DILTIAZEM HYDROCHLORIDE 180 MG/1
CAPSULE, COATED, EXTENDED RELEASE ORAL
Qty: 90 CAPSULE | Refills: 1 | Status: SHIPPED | OUTPATIENT
Start: 2022-07-11 | End: 2022-07-26 | Stop reason: SDUPTHER

## 2022-07-11 RX ORDER — OMEPRAZOLE 40 MG/1
CAPSULE, DELAYED RELEASE ORAL
Qty: 90 CAPSULE | Refills: 1 | Status: SHIPPED | OUTPATIENT
Start: 2022-07-11 | End: 2023-01-11

## 2022-07-19 ENCOUNTER — LAB (OUTPATIENT)
Dept: LAB | Facility: OTHER | Age: 77
End: 2022-07-19

## 2022-07-19 DIAGNOSIS — E78.2 MIXED HYPERLIPIDEMIA: Chronic | ICD-10-CM

## 2022-07-19 DIAGNOSIS — K21.9 GASTROESOPHAGEAL REFLUX DISEASE, UNSPECIFIED WHETHER ESOPHAGITIS PRESENT: Chronic | ICD-10-CM

## 2022-07-19 DIAGNOSIS — I47.1 SVT (SUPRAVENTRICULAR TACHYCARDIA): Chronic | ICD-10-CM

## 2022-07-19 DIAGNOSIS — M81.0 AGE-RELATED OSTEOPOROSIS WITHOUT CURRENT PATHOLOGICAL FRACTURE: Chronic | ICD-10-CM

## 2022-07-19 DIAGNOSIS — R00.2 PALPITATIONS: ICD-10-CM

## 2022-07-19 DIAGNOSIS — Z11.59 ENCOUNTER FOR HEPATITIS C SCREENING TEST FOR LOW RISK PATIENT: ICD-10-CM

## 2022-07-19 LAB
25(OH)D3 SERPL-MCNC: 45.1 NG/ML (ref 30–100)
ALBUMIN SERPL-MCNC: 4.1 G/DL (ref 3.5–5)
ALBUMIN/GLOB SERPL: 1.3 G/DL (ref 1.1–1.8)
ALP SERPL-CCNC: 88 U/L (ref 38–126)
ALT SERPL W P-5'-P-CCNC: 12 U/L
ANION GAP SERPL CALCULATED.3IONS-SCNC: 7 MMOL/L (ref 5–15)
AST SERPL-CCNC: 22 U/L (ref 14–36)
BASOPHILS # BLD AUTO: 0.04 10*3/MM3 (ref 0–0.2)
BASOPHILS NFR BLD AUTO: 0.6 % (ref 0–1.5)
BILIRUB SERPL-MCNC: 0.5 MG/DL (ref 0.2–1.3)
BUN SERPL-MCNC: 11 MG/DL (ref 7–23)
BUN/CREAT SERPL: 14.9 (ref 7–25)
CALCIUM SPEC-SCNC: 9.6 MG/DL (ref 8.4–10.2)
CHLORIDE SERPL-SCNC: 104 MMOL/L (ref 101–112)
CHOLEST SERPL-MCNC: 253 MG/DL (ref 150–200)
CO2 SERPL-SCNC: 29 MMOL/L (ref 22–30)
CREAT SERPL-MCNC: 0.74 MG/DL (ref 0.52–1.04)
DEPRECATED RDW RBC AUTO: 44 FL (ref 37–54)
EGFRCR SERPLBLD CKD-EPI 2021: 84.5 ML/MIN/1.73
EOSINOPHIL # BLD AUTO: 0.11 10*3/MM3 (ref 0–0.4)
EOSINOPHIL NFR BLD AUTO: 1.7 % (ref 0.3–6.2)
ERYTHROCYTE [DISTWIDTH] IN BLOOD BY AUTOMATED COUNT: 12.6 % (ref 12.3–15.4)
GLOBULIN UR ELPH-MCNC: 3.2 GM/DL (ref 2.3–3.5)
GLUCOSE SERPL-MCNC: 100 MG/DL (ref 70–99)
HCT VFR BLD AUTO: 37.8 % (ref 34–46.6)
HCV AB SER DONR QL: NORMAL
HDLC SERPL-MCNC: 117 MG/DL (ref 40–59)
HGB BLD-MCNC: 12.2 G/DL (ref 12–15.9)
LDLC SERPL CALC-MCNC: 119 MG/DL
LDLC/HDLC SERPL: 0.99 {RATIO} (ref 0–3.22)
LYMPHOCYTES # BLD AUTO: 1.85 10*3/MM3 (ref 0.7–3.1)
LYMPHOCYTES NFR BLD AUTO: 28.7 % (ref 19.6–45.3)
MCH RBC QN AUTO: 31.4 PG (ref 26.6–33)
MCHC RBC AUTO-ENTMCNC: 32.3 G/DL (ref 31.5–35.7)
MCV RBC AUTO: 97.2 FL (ref 79–97)
MONOCYTES # BLD AUTO: 0.83 10*3/MM3 (ref 0.1–0.9)
MONOCYTES NFR BLD AUTO: 12.9 % (ref 5–12)
NEUTROPHILS NFR BLD AUTO: 3.62 10*3/MM3 (ref 1.7–7)
NEUTROPHILS NFR BLD AUTO: 56.1 % (ref 42.7–76)
PLATELET # BLD AUTO: 329 10*3/MM3 (ref 140–450)
PMV BLD AUTO: 9.2 FL (ref 6–12)
POTASSIUM SERPL-SCNC: 4.1 MMOL/L (ref 3.4–5)
PROT SERPL-MCNC: 7.3 G/DL (ref 6.3–8.6)
RBC # BLD AUTO: 3.89 10*6/MM3 (ref 3.77–5.28)
SODIUM SERPL-SCNC: 140 MMOL/L (ref 137–145)
TRIGL SERPL-MCNC: 101 MG/DL
TSH SERPL DL<=0.05 MIU/L-ACNC: 3.27 UIU/ML (ref 0.27–4.2)
VLDLC SERPL-MCNC: 17 MG/DL (ref 5–40)
WBC NRBC COR # BLD: 6.45 10*3/MM3 (ref 3.4–10.8)

## 2022-07-19 PROCEDURE — 82306 VITAMIN D 25 HYDROXY: CPT | Performed by: INTERNAL MEDICINE

## 2022-07-19 PROCEDURE — 86803 HEPATITIS C AB TEST: CPT | Performed by: INTERNAL MEDICINE

## 2022-07-19 PROCEDURE — 80061 LIPID PANEL: CPT | Performed by: INTERNAL MEDICINE

## 2022-07-19 PROCEDURE — 85025 COMPLETE CBC W/AUTO DIFF WBC: CPT | Performed by: INTERNAL MEDICINE

## 2022-07-19 PROCEDURE — 84443 ASSAY THYROID STIM HORMONE: CPT | Performed by: INTERNAL MEDICINE

## 2022-07-19 PROCEDURE — 36415 COLL VENOUS BLD VENIPUNCTURE: CPT | Performed by: INTERNAL MEDICINE

## 2022-07-19 PROCEDURE — 80053 COMPREHEN METABOLIC PANEL: CPT | Performed by: INTERNAL MEDICINE

## 2022-07-26 ENCOUNTER — OFFICE VISIT (OUTPATIENT)
Dept: FAMILY MEDICINE CLINIC | Facility: CLINIC | Age: 77
End: 2022-07-26

## 2022-07-26 VITALS
BODY MASS INDEX: 21.9 KG/M2 | SYSTOLIC BLOOD PRESSURE: 152 MMHG | WEIGHT: 123.6 LBS | HEART RATE: 68 BPM | HEIGHT: 63 IN | DIASTOLIC BLOOD PRESSURE: 60 MMHG | TEMPERATURE: 97.1 F

## 2022-07-26 DIAGNOSIS — R53.83 FATIGUE, UNSPECIFIED TYPE: ICD-10-CM

## 2022-07-26 DIAGNOSIS — Z23 NEED FOR SHINGLES VACCINE: ICD-10-CM

## 2022-07-26 DIAGNOSIS — I10 ESSENTIAL HYPERTENSION: Chronic | ICD-10-CM

## 2022-07-26 DIAGNOSIS — Z00.00 MEDICARE ANNUAL WELLNESS VISIT, SUBSEQUENT: Primary | ICD-10-CM

## 2022-07-26 DIAGNOSIS — E78.2 MIXED HYPERLIPIDEMIA: Chronic | ICD-10-CM

## 2022-07-26 DIAGNOSIS — F41.1 GENERALIZED ANXIETY DISORDER: Chronic | ICD-10-CM

## 2022-07-26 DIAGNOSIS — K21.9 GASTROESOPHAGEAL REFLUX DISEASE, UNSPECIFIED WHETHER ESOPHAGITIS PRESENT: Chronic | ICD-10-CM

## 2022-07-26 DIAGNOSIS — S29.019A THORACIC MYOFASCIAL STRAIN, INITIAL ENCOUNTER: ICD-10-CM

## 2022-07-26 DIAGNOSIS — S29.012A STRAIN OF THORACIC BACK REGION: ICD-10-CM

## 2022-07-26 DIAGNOSIS — M62.838 MUSCLE SPASMS OF NECK: ICD-10-CM

## 2022-07-26 DIAGNOSIS — M81.0 AGE-RELATED OSTEOPOROSIS WITHOUT CURRENT PATHOLOGICAL FRACTURE: ICD-10-CM

## 2022-07-26 DIAGNOSIS — F32.A DEPRESSIVE DISORDER: Chronic | ICD-10-CM

## 2022-07-26 PROCEDURE — G0439 PPPS, SUBSEQ VISIT: HCPCS | Performed by: INTERNAL MEDICINE

## 2022-07-26 PROCEDURE — 1159F MED LIST DOCD IN RCRD: CPT | Performed by: INTERNAL MEDICINE

## 2022-07-26 PROCEDURE — 99214 OFFICE O/P EST MOD 30 MIN: CPT | Performed by: INTERNAL MEDICINE

## 2022-07-26 RX ORDER — TIZANIDINE 4 MG/1
TABLET ORAL
Qty: 30 TABLET | Refills: 3 | Status: SHIPPED | OUTPATIENT
Start: 2022-07-26

## 2022-07-26 RX ORDER — DILTIAZEM HYDROCHLORIDE 240 MG/1
240 CAPSULE, COATED, EXTENDED RELEASE ORAL DAILY
Qty: 90 CAPSULE | Refills: 3 | Status: SHIPPED | OUTPATIENT
Start: 2022-07-26

## 2022-07-26 NOTE — PATIENT INSTRUCTIONS
Medicare Wellness  Personal Prevention Plan of Service     Date of Office Visit:    Encounter Provider:  Feliz Valiente MD  Place of Service:  [facility] [place]  Patient Name: Supriya Alberto  :  1945    As part of the Medicare Wellness portion of your visit today, we are providing you with this personalized preventive plan of services (PPPS). This plan is based upon recommendations of the [place] Preventive Services Task Force (USPSTF) and the Advisory Committee on Immunization Practices (ACIP).    This lists the preventive care services that should be considered, and provides dates of when you are due. Items listed as completed are up-to-date and do not require any further intervention.    Health Maintenance   Topic Date Due    ZOSTER VACCINE (1 of 2) Never done    ANNUAL WELLNESS VISIT  2022    INFLUENZA VACCINE  10/01/2022    LIPID PANEL  2023    COLORECTAL CANCER SCREENING  2024    DXA SCAN  2024    MAMMOGRAM  2024    TDAP/TD VACCINES (2 - Td or Tdap) 2028    HEPATITIS C SCREENING  Completed    COVID-19 Vaccine  Completed    Pneumococcal Vaccine 65+  Completed       No orders of the defined types were placed in this encounter.      Return in about 1 year (around 2023) for Medicare Wellness.

## 2022-07-26 NOTE — PROGRESS NOTES
Chief Complaint  Annual Exam, Pain (Arthritis pain general ), and Medicare Wellness-subsequent    Subjective        History of Present Illness     Supriya Alberto presents to the office for annual exam and follow up on chronic medical issues including hyperlipiemia,  PSVT, GERD/gastritis, osteopenia, generalized anxiety disorder, seasonal allergies, mild intermittent asthma, and dysthymia among other issues.   GERD symptoms adequately managed with Prilosec.  CRISTOBAL/depression managed well with Paxil and Buspar.  She reports rare use of laxative and dietary fiber helps manage constipation.       She is reporting new problem of thoracic back pain as well as impressive painful muscle spasm of the left posterior neck muscles.  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  We discussed treatment options and management.    Patient also completes subsequent Medicare wellness exam while in the office today.  Shingrix vaccination is recommended and prescription is sent to [facility] pharmacy.  Otherwise, patient is up to date on cancer screenings and immunizations.      DEXA 06/2022 reveals new osteoporosis of the hip and mild osteopenia of the lumbar spine.  She could not tolerate monthly Boniva due to myalgias and continues IV Reclast, which is scheduled next week.  She continues on calcium/vitamin D supplement    Weight is stable from one year ago at 123 pounds.  With her visit last summer, I increased diltiazem CD from 120 mg to 180 mg to address palpitations in this patient with PSVT followed by Dr. Aguirre.  She has an ECHO scheduled 09/20/2022.  BP has been trending up and is above goal in the office today at 152/60.  Patient states BP has been fluctuating with home monitoring, running above at times.  We discussed further increase in her diltiazem dose from 180 to 240.  She has a 90-day supply of 180 mg  "on hand,        Patient has been off statins for approximately 4 years.  Her total cholesterol and LDL cholesterol remain above goal, although, her excellent HDL cholesterol 117 gives her a favorable cholesterol ratio 0.99.      The patient's relevant past medical, surgical, and social history was reviewed in Epic.   Lab results are reviewed with the patient today.  CBC unremarkable.  Fasting glucose 100.   Normal renal and liver function.  Normal thyroid screen.  Cholesterol ratio favorable as noted above.  Vitamin D at goal with calcium/vitamin D      Objective   Vital Signs:  /60   Pulse 68   Temp 97.1 °F (36.2 °C) (Tympanic)   Ht 160 cm (63\")   Wt 56.1 kg (123 lb 9.6 oz)   BMI 21.89 kg/m²   Estimated body mass index is 21.89 kg/m² as calculated from the following:    Height as of this encounter: 160 cm (63\").    Weight as of this encounter: 56.1 kg (123 lb 9.6 oz).    BMI is within normal parameters. No other follow-up for BMI required.      Physical Exam  Vitals reviewed.   Constitutional:       General: She is not in acute distress.     Appearance: She is well-developed.      Comments: Pleasant female.    HENT:      Head: Normocephalic and atraumatic.      Nose:      Right Sinus: No maxillary sinus tenderness or frontal sinus tenderness.      Left Sinus: No maxillary sinus tenderness or frontal sinus tenderness.      Mouth/Throat:      Mouth: No oral lesions.      Pharynx: Uvula midline.      Tonsils: No tonsillar exudate.   Eyes:      Conjunctiva/sclera: Conjunctivae normal.      Pupils: Pupils are equal, round, and reactive to light.   Neck:      Thyroid: No thyroid mass or thyromegaly.      Vascular: No carotid bruit or JVD.      Trachea: Trachea normal. No tracheal deviation.   Cardiovascular:      Rate and Rhythm: Normal rate and regular rhythm.  No extrasystoles are present.     Chest Wall: PMI is not displaced.      Heart sounds: Normal heart sounds. No murmur heard.  Pulmonary:      Effort: " Pulmonary effort is normal. No accessory muscle usage or respiratory distress.      Breath sounds: Normal breath sounds. No decreased breath sounds, wheezing, rhonchi or rales.   Abdominal:      General: Bowel sounds are normal. There is no distension.      Palpations: Abdomen is soft.      Tenderness: There is no abdominal tenderness.   Musculoskeletal:      Cervical back: Neck supple.   Lymphadenopathy:      Cervical: No cervical adenopathy.   Skin:     General: Skin is warm and dry.      Findings: No rash.      Nails: There is no clubbing.   Neurological:      Mental Status: She is alert and oriented to person, place, and time.      Cranial Nerves: No cranial nerve deficit.      Coordination: Coordination normal.   Psychiatric:         Speech: Speech normal.         Behavior: Behavior normal.         Thought Content: Thought content normal.         Judgment: Judgment normal.            Result Review :    CMP    CMP 6/24/22 7/19/22   Glucose  100 (A)   Glucose 99    BUN 12 11   Creatinine 0.7 0.74   Sodium 131 (A) 140   Potassium 4.3 4.1   Chloride 99 104   Calcium 9.3 9.6   Albumin 3.4 4.10   Total Bilirubin 0.40 0.5   Alkaline Phosphatase 72 88   AST (SGOT) 27 22   ALT (SGPT) 19 12   (A) Abnormal value            CBC w/diff    CBC w/Diff 7/19/22   WBC 6.45   RBC 3.89   Hemoglobin 12.2   Hematocrit 37.8   MCV 97.2 (A)   MCH 31.4   MCHC 32.3   RDW 12.6   Platelets 329   Neutrophil Rel % 56.1   Lymphocyte Rel % 28.7   Monocyte Rel % 12.9 (A)   Eosinophil Rel % 1.7   Basophil Rel % 0.6   (A) Abnormal value            Lipid Panel    Lipid Panel 7/19/22   Total Cholesterol 253 (A)   Triglycerides 101   HDL Cholesterol 117 (A)   VLDL Cholesterol 17   LDL Cholesterol  119 (A)   LDL/HDL Ratio 0.99   (A) Abnormal value            TSH    TSH 7/19/22   TSH 3.270               Data reviewed: Radiologic studies DEXA 06/2022          Assessment and Plan   Diagnoses and all orders for this visit:    1. Medicare annual wellness  visit, subsequent (Primary)    2. Need for shingles vaccine    3. Muscle spasms of neck    4. Strain of thoracic back region    5. Essential hypertension  -     CBC Auto Differential; Future  -     Comprehensive Metabolic Panel; Future  -     Lipid Panel; Future    6. Mixed hyperlipidemia  -     Lipid Panel; Future  -     TSH; Future    7. Gastroesophageal reflux disease, unspecified whether esophagitis present  -     CBC Auto Differential; Future    8. Generalized anxiety disorder    9. Depressive disorder    10. Thoracic myofascial strain, initial encounter    11. Fatigue, unspecified type  -     TSH; Future  -     Vitamin B12; Future    12. Age-related osteoporosis without current pathological fracture  -     Vitamin D 25 Hydroxy; Future    Other orders  -     Zoster Vac Recomb Adjuvanted 50 MCG/0.5ML reconstituted suspension; Inject 0.5 mL into the appropriate muscle as directed by prescriber Every 2 (Two) Months.  Dispense: 1 each; Refill: 1  -     dilTIAZem CD (CARDIZEM CD) 240 MG 24 hr capsule; Take 1 capsule by mouth Daily.  Dispense: 90 capsule; Refill: 3  -     tiZANidine (ZANAFLEX) 4 MG tablet; 1/2-1 qhs prn muscle spasms  Dispense: 30 tablet; Refill: 3           I spent 33 minutes caring for Supriya on this date of service. This time includes time spent by me in the following activities:preparing for the visit, reviewing tests, obtaining and/or reviewing a separately obtained history, performing a medically appropriate examination and/or evaluation , counseling and educating the patient/family/caregiver, ordering medications, tests, or procedures and documenting information in the medical record.  This does not include time spent on her Medicare AWV today    Patient completes subsequent Medicare Wellness exam today.  A prescription is sent to [facility] for Shingrix.  She can check the cost of co-pay prior to having the vaccine administered.  Otherwise, patient is up to date on immunizations and cancer  screenings.     To help address new problem of thoracic strain and muscle spasm of left posterior neck, a prescription is sent for Zanaflex 4 mg to take 1/2-1 qhs prn muscle spasms.   I demonstrated home exercises and stretching to improve range of motion, as well as deep muscle massage to repeat several times daily to help relieve upper arm muscle pain and posterior neck/cervical muscle spasm.  She can continue the OTC Tylenol and rest when thoracic back pain flares.  She has successfully tolerated some short courses of Celebrex in the past.    Her blood pressure is not at goal.  Increase diltiazem CD to 240 mg daily.  Continue to monitor blood pressure and heart rate at home and notify me if not at goal.  Keep follow-up appointment with Dr. Aguirre.    Although her total cholesterol is elevated, this is due to a very large amount of HDL cholesterol.  Her LDL/HDL ratio is excellent.  No prescription cholesterol medication is needed.    Continue the Prilosec for GERD, which is working well.  Recommended fiber therapy to help manage constipation.  Limit use of laxatives to rare.     Continue calcium/vitamin D supplements as well as IV Reclast, scheduled for next week (08/03/2022)  Repeat DEXA due 06/2024.      Continue Paxil and Buspar for CRISTOBAL and depression, for which patient reports good control.      Return in 12 months for annual exam with fasting labs one week prior or sooner if needed.     Scribed for Dr. Valiente by Samina Field [facility].     Follow Up   Return in about 1 year (around 7/26/2023) for Medicare Wellness.  Patient was given instructions and counseling regarding her condition or for health maintenance advice. Please see specific information pulled into the AVS if appropriate.

## 2022-07-26 NOTE — PROGRESS NOTES
The ABCs of the Annual Wellness Visit  Subsequent Medicare Wellness Visit    Chief Complaint   Patient presents with   • Annual Exam   • Pain     Arthritis pain general    • Medicare Wellness-subsequent      Subjective    History of Present Illness:  Supriya Alberto is a 77 y.o. female who presents for a Subsequent Medicare Wellness Visit.    The following portions of the patient's history were reviewed and   updated as appropriate: allergies, current medications, past family history, past medical history, past social history, past surgical history and problem list.    Compared to one year ago, the patient feels her physical   health is the same.    Compared to one year ago, the patient feels her mental   health is the same.    Recent Hospitalizations:  She was not admitted to the hospital during the last year.       Current Medical Providers:  Patient Care Team:  Feliz Valiente MD as PCP - General (Internal Medicine)    Outpatient Medications Prior to Visit   Medication Sig Dispense Refill   • busPIRone (BUSPAR) 10 MG tablet Take 1 tablet by mouth 2 (two) times a day. 60 tablet 11   • Calcium Carbonate-Vit D-Min (CALTRATE 600+D PLUS PO) Take 1 tablet by mouth 2 (Two) Times a Day.     • celecoxib (CeleBREX) 100 MG capsule Take 1 capsule by mouth Daily. With food 10 capsule 0   • CLOBETASOL PROPIONATE EMULSION EX Apply  topically.     • fluticasone (FLONASE) 50 MCG/ACT nasal spray Use 2 spray(s) in each nostril once daily 16 g 0   • ketoconazole (NIZORAL) 2 % shampoo Apply  topically to the appropriate area as directed 2 (Two) Times a Week.     • loratadine (CLARITIN) 10 MG tablet Take 10 mg by mouth Daily.     • omeprazole (priLOSEC) 40 MG capsule Take 1 capsule by mouth once daily in the morning 90 capsule 1   • ondansetron (Zofran) 4 MG tablet Take 1 tablet by mouth Every 8 (Eight) Hours As Needed for Nausea or Vomiting. 20 tablet 0   • PARoxetine (PAXIL) 20 MG tablet TAKE 1 TABLET BY MOUTH ONCE DAILY AT BEDTIME 90  tablet 1   • phenylephrine (SUDAFED PE) 10 MG tablet 1 po 2-3 times daily prn congestion 36 tablet 0   • Psyllium (METAMUCIL FIBER PO) Take  by mouth.     • triamcinolone (KENALOG) 0.1 % cream Apply  topically to the appropriate area as directed 3 (Three) Times a Day. For itching 80 g 0   • triamcinolone (KENALOG) 0.1 % lotion Apply  topically to the appropriate area as directed 3 (Three) Times a Day. 120 mL 2   • dilTIAZem CD (CARDIZEM CD) 180 MG 24 hr capsule Take 1 capsule by mouth once daily 90 capsule 1   • predniSONE (DELTASONE) 10 MG tablet Take 1 tablet by mouth Daily. 10 tablet 0   • tiZANidine (ZANAFLEX) 4 MG tablet 1 qhs prn muscle spasms 15 tablet 0   • Zoster Vac Recomb Adjuvanted 50 MCG/0.5ML reconstituted suspension Inject 0.5 mL into the appropriate muscle as directed by prescriber Every 2 (Two) Months. 1 each 1     No facility-administered medications prior to visit.       No opioid medication identified on active medication list. I have reviewed chart for other potential  high risk medication/s and harmful drug interactions in the elderly.          Aspirin is not on active medication list.  Aspirin use is not indicated based on review of current medical condition/s. Risk of harm outweighs potential benefits.  .    Patient Active Problem List   Diagnosis   • Rosacea   • Prolapse of vaginal vault after hysterectomy   • SVT (supraventricular tachycardia) - S/P ablation 2015   • Palpitations   • Osteopenia - spine   • Menopause   • Malaise and fatigue   • Mixed hyperlipidemia   • GERD (gastroesophageal reflux disease)   • Generalized anxiety disorder   • Jw hematuria   • Eustachian tube disorder   • Dizziness   • Diverticulitis of sigmoid colon   • Depressive disorder   • Constipation   • Angina pectoris (HCC)   • Allergic rhinitis   • Mitral valve regurgitation-moderate   • Mild diastolic dysfunction   • Nonrheumatic aortic valve insufficiency   • Varicose veins of both lower extremities   •  "Age-related osteoporosis without current pathological fracture   • Essential hypertension   • Primary osteoarthritis of left shoulder     Advance Care Planning  Advance Directive is not on file.  ACP discussion was held with the patient during this visit. Patient does not have an advance directive, information provided.          Objective    Vitals:    07/26/22 0952   BP: 152/60   Pulse: 68   Temp: 97.1 °F (36.2 °C)   TempSrc: Tympanic   Weight: 56.1 kg (123 lb 9.6 oz)   Height: 160 cm (63\")   PainSc:   5   PainLoc: Generalized     Estimated body mass index is 21.89 kg/m² as calculated from the following:    Height as of this encounter: 160 cm (63\").    Weight as of this encounter: 56.1 kg (123 lb 9.6 oz).    BMI is within normal parameters. No other follow-up for BMI required.      Does the patient have evidence of cognitive impairment? No    Physical Exam  Lab Results   Component Value Date    GLU 99 06/24/2022    TRIG 101 07/19/2022     (H) 07/19/2022     (H) 07/19/2022    VLDL 17 07/19/2022            HEALTH RISK ASSESSMENT    Smoking Status:  Social History     Tobacco Use   Smoking Status Never Smoker   Smokeless Tobacco Never Used     Alcohol Consumption:  Social History     Substance and Sexual Activity   Alcohol Use No     Fall Risk Screen:    STEADI Fall Risk Assessment was completed, and patient is at LOW risk for falls.Assessment completed on:7/26/2022    Depression Screening:  PHQ-2/PHQ-9 Depression Screening 7/26/2022   Retired PHQ-9 Total Score -   Retired Total Score -   Little Interest or Pleasure in Doing Things 0-->not at all   Feeling Down, Depressed or Hopeless 0-->not at all   PHQ-9: Brief Depression Severity Measure Score 0       Health Habits and Functional and Cognitive Screening:  Functional & Cognitive Status 7/26/2022   Do you have difficulty preparing food and eating? No   Do you have difficulty bathing yourself, getting dressed or grooming yourself? No   Do you have " difficulty using the toilet? No   Do you have difficulty moving around from place to place? No   Do you have trouble with steps or getting out of a bed or a chair? No   Current Diet Limited Junk Food   Dental Exam Not up to date   Eye Exam Up to date   Exercise (times per week) 5 times per week   Current Exercises Include House Cleaning   Current Exercise Activities Include -   Do you need help using the phone?  No   Are you deaf or do you have serious difficulty hearing?  No   Do you need help with transportation? No   Do you need help shopping? No   Do you need help preparing meals?  No   Do you need help with housework?  No   Do you need help with laundry? No   Do you need help taking your medications? No   Do you need help managing money? No   Do you ever drive or ride in a car without wearing a seat belt? No   Have you felt unusual stress, anger or loneliness in the last month? No   Who do you live with? Spouse   If you need help, do you have trouble finding someone available to you? No   Have you been bothered in the last four weeks by sexual problems? No   Do you have difficulty concentrating, remembering or making decisions? Yes       Age-appropriate Screening Schedule:  Refer to the list below for future screening recommendations based on patient's age, sex and/or medical conditions. Orders for these recommended tests are listed in the plan section. The patient has been provided with a written plan.    Health Maintenance   Topic Date Due   • ZOSTER VACCINE (1 of 2) Never done   • INFLUENZA VACCINE  10/01/2022   • LIPID PANEL  07/19/2023   • DXA SCAN  06/01/2024   • MAMMOGRAM  07/19/2024   • TDAP/TD VACCINES (2 - Td or Tdap) 03/01/2028              Assessment & Plan   CMS Preventative Services Quick Reference  Risk Factors Identified During Encounter  Chronic Pain   Immunizations Discussed/Encouraged (specific Immunizations; Shingrix  Breast cancer screening     A prescription is sent to [facility] for  Shingrix.  She can check the cost of co-pay prior to having the vaccine administered.  Otherwise, patient is up to date on immunizations and cancer screenings.     The above risks/problems have been discussed with the patient.  Follow up actions/plans if indicated are seen below in the Assessment/Plan Section.  Pertinent information has been shared with the patient in the After Visit Summary.    Diagnoses and all orders for this visit:    1. Medicare annual wellness visit, subsequent (Primary)    2. Need for shingles vaccine    3. Muscle spasms of neck    4. Strain of thoracic back region    5. Essential hypertension  -     CBC Auto Differential; Future  -     Comprehensive Metabolic Panel; Future  -     Lipid Panel; Future    6. Mixed hyperlipidemia  -     Lipid Panel; Future  -     TSH; Future    7. Gastroesophageal reflux disease, unspecified whether esophagitis present  -     CBC Auto Differential; Future    8. Generalized anxiety disorder    9. Depressive disorder    10. Thoracic myofascial strain, initial encounter    11. Fatigue, unspecified type  -     TSH; Future  -     Vitamin B12; Future    12. Age-related osteoporosis without current pathological fracture  -     Vitamin D 25 Hydroxy; Future    Other orders  -     Zoster Vac Recomb Adjuvanted 50 MCG/0.5ML reconstituted suspension; Inject 0.5 mL into the appropriate muscle as directed by prescriber Every 2 (Two) Months.  Dispense: 1 each; Refill: 1  -     dilTIAZem CD (CARDIZEM CD) 240 MG 24 hr capsule; Take 1 capsule by mouth Daily.  Dispense: 90 capsule; Refill: 3  -     tiZANidine (ZANAFLEX) 4 MG tablet; 1/2-1 qhs prn muscle spasms  Dispense: 30 tablet; Refill: 3        Follow Up:   Return in about 1 year (around 7/26/2023) for Medicare Wellness.     An After Visit Summary and PPPS were made available to the patient.          I spent 7 minutes caring for Supriya on this date of service. This time includes time spent by me in the following  activities:preparing for the visit, performing a medically appropriate examination and/or evaluation , counseling and educating the patient/family/caregiver and documenting information in the medical record

## 2022-07-27 RX ORDER — SODIUM CHLORIDE 9 MG/ML
250 INJECTION, SOLUTION INTRAVENOUS ONCE
OUTPATIENT
Start: 2022-08-02

## 2022-08-03 RX ORDER — ZOLEDRONIC ACID 5 MG/100ML
5 INJECTION, SOLUTION INTRAVENOUS ONCE
Start: 2022-08-03 | End: 2022-08-03

## 2022-08-05 DIAGNOSIS — J01.00 ACUTE NON-RECURRENT MAXILLARY SINUSITIS: ICD-10-CM

## 2022-08-05 RX ORDER — FLUTICASONE PROPIONATE 50 MCG
SPRAY, SUSPENSION (ML) NASAL
Qty: 16 G | Refills: 5 | Status: SHIPPED | OUTPATIENT
Start: 2022-08-05

## 2022-08-15 RX ORDER — BUSPIRONE HYDROCHLORIDE 10 MG/1
TABLET ORAL
Qty: 180 TABLET | Refills: 3 | Status: SHIPPED | OUTPATIENT
Start: 2022-08-15

## 2022-09-27 ENCOUNTER — TELEPHONE (OUTPATIENT)
Dept: CARDIOLOGY | Facility: CLINIC | Age: 77
End: 2022-09-27

## 2022-09-27 NOTE — TELEPHONE ENCOUNTER
Contacted patient per Dr. Aguirre to inform her that her echo showed a good EF with a moderate leaky valve, which will be continued to be monitored every few years. She was not available and no VM could be left.

## 2022-09-29 ENCOUNTER — TELEPHONE (OUTPATIENT)
Dept: CARDIOLOGY | Facility: CLINIC | Age: 77
End: 2022-09-29

## 2022-09-29 NOTE — TELEPHONE ENCOUNTER
Returned patient call for her echo reslts; per Dr. Aguirre to inform her that her echo showed a good EF with a moderate leaky valve, which will be continued to be monitored with an echo every few years. She voiced her understanding.   ----- Message from Kay Mcgill sent at 2022 10:08 AM CDT -----  Regarding: results  Contact: [phone]  Supriya woods 49 wanted her results from her Echo, she wanted a call back @ [phone] to talk about this. Thxs

## 2022-10-10 RX ORDER — DILTIAZEM HYDROCHLORIDE 180 MG/1
CAPSULE, COATED, EXTENDED RELEASE ORAL
Qty: 90 CAPSULE | Refills: 3 | Status: SHIPPED | OUTPATIENT
Start: 2022-10-10 | End: 2022-11-30

## 2022-11-30 ENCOUNTER — OFFICE VISIT (OUTPATIENT)
Dept: CARDIOLOGY | Facility: CLINIC | Age: 77
End: 2022-11-30

## 2022-11-30 VITALS
BODY MASS INDEX: 22.32 KG/M2 | HEART RATE: 75 BPM | OXYGEN SATURATION: 93 % | HEIGHT: 63 IN | DIASTOLIC BLOOD PRESSURE: 60 MMHG | WEIGHT: 126 LBS | SYSTOLIC BLOOD PRESSURE: 118 MMHG

## 2022-11-30 DIAGNOSIS — I47.1 SVT (SUPRAVENTRICULAR TACHYCARDIA): Primary | ICD-10-CM

## 2022-11-30 DIAGNOSIS — I10 ESSENTIAL HYPERTENSION: ICD-10-CM

## 2022-11-30 DIAGNOSIS — I35.1 NONRHEUMATIC AORTIC VALVE INSUFFICIENCY: ICD-10-CM

## 2022-11-30 DIAGNOSIS — E78.2 MIXED HYPERLIPIDEMIA: Chronic | ICD-10-CM

## 2022-11-30 DIAGNOSIS — I34.0 NONRHEUMATIC MITRAL VALVE REGURGITATION: Chronic | ICD-10-CM

## 2022-11-30 PROCEDURE — 99213 OFFICE O/P EST LOW 20 MIN: CPT | Performed by: INTERNAL MEDICINE

## 2022-11-30 PROCEDURE — 93000 ELECTROCARDIOGRAM COMPLETE: CPT | Performed by: INTERNAL MEDICINE

## 2022-11-30 NOTE — PROGRESS NOTES
Supriya Alberto  77 y.o. female    11/30/2022     1. SVT (supraventricular tachycardia) (HCC)    2. Essential hypertension    3. Mixed hyperlipidemia    4. Nonrheumatic aortic valve insufficiency    5. Nonrheumatic mitral valve regurgitation        History of Present Illness:    Patient's Body mass index is 22.32 kg/m². BMI is within normal parameters. No follow-up required.   .  77 years old patient presented today dated 11/30/2022 for routine follow-up no symptom of cardiac decompensation such orthopnea PND chest pain or tachycardia reported.  She is doing remarkably well.  He has a history of chest pain with normal cardiac catheterization.  Previous EKG sinus rhythm with borderline prolonged MI interval.  Recent echocardiogram preserved left ventricle systolic function in the right ventricular cavity mildly dilated and.  Rectal relaxation abnormality and moderate mitral regurgitation there is no surgical indication at this stage.  She has mild aortic regurgitation patient with history of supraventricular tachycardia s/p EP study and SVT ablation pleased with clinical outcome         Echo 9/20/2022    • Calculated left ventricular 3D EF = 64% Estimated left ventricular EF = 61% Left ventricular ejection fraction appears to be 61 - 65%. Left ventricular systolic function is normal.  • Left ventricular diastolic function is consistent with (grade I) impaired relaxation.  • The right ventricular cavity is mildly dilated.  • Moderate mitral valve regurgitation is present.      Echo 10/25/2019  · Estimated EF = 61%.  · Left ventricular systolic function is normal.  · Left ventricular diastolic dysfunction (grade I) consistent with impaired relaxation.  · Mild aortic valve regurgitation is present.  · Moderate mitral valve regurgitation is present  · Mild tricuspid valve regurgitation is present.      3/2019     Total Cholesterol 150 - 200 mg/dL 253 Abnormally high     Triglycerides <=150 mg/dL 118    HDL Cholesterol 40  - 59 mg/dL 121 Abnormally high     LDL Cholesterol  <=100 mg/dL 108 Abnormally high     VLDL Cholesterol mg/dL 23.6    LDL/HDL Ratio 0.00 - 3.22 0.90             3/2018  Total Cholesterol 150 - 200 mg/dL 268     Triglycerides 35 - 160 mg/dL 103    HDL Cholesterol 35 - 100 mg/dL 114     LDL Cholesterol  mg/dL 133    VLDL Cholesterol mg/dL 20.6    LDL/HDL Ratio   1.17         ECHO 4/17/2017  · Left ventricular function is normal. Estimated EF = 60%.  · Left ventricular diastolic dysfunction (grade I) consistent with impaired relaxation.  · Mild aortic valve regurgitation is present.  · Moderate mitral valve regurgitation is present  · Mild tricuspid valve regurgitation is present        SUBJECTIVE:    Allergies   Allergen Reactions   • Reclast [Zoledronic Acid] Myalgia     Bone pain, fever, chills, nausea, flulike aches and pains   • Crestor [Rosuvastatin Calcium] Myalgia   • Adhesive Tape Rash   • Latex Rash         Past Medical History:   Diagnosis Date   • Age-related osteoporosis without current pathological fracture 7/14/2020   • Allergic rhinitis    • Angina pectoris (HCC)    • Chronic seasonal allergic rhinitis due to pollen    • Constipation    • Depressive disorder    • Diverticulitis of sigmoid colon    • Dizziness    • Essential hypertension 7/19/2021   • Eustachian tube disorder    • Jw hematuria    • Generalized anxiety disorder    • GERD (gastroesophageal reflux disease)    • Hyperlipidemia    • Malaise and fatigue    • Menopause    • Mild diastolic dysfunction    • Mitral valve regurgitation-moderate    • Mixed hyperlipidemia    • Osteopenia    • Palpitations    • Paroxysmal supraventricular tachycardia (HCC)    • Prolapse of vaginal vault after hysterectomy    • Rosacea    • SVT (supraventricular tachycardia) - S/P ablation 2015    • Syncope          Past Surgical History:   Procedure Laterality Date   • CARDIAC ABLATION  02/2015    SVT pathway ablation, Dr. Aguirre   • CARDIAC CATHETERIZATION   11/12/2014    Normal coronaries. Normal LV systolic function   • COLPOPEXY VAGINAL  01/28/2013    laparoscopic uterisacral colpopexy (intraperitoneal) Lysis of adhesions (took 1.5 hrs of 2.5 hrs spent laparoscopically ) Tension free vaginal tape midurethral sling Posterior colporrhaphy. Perineorrhaphy. Cystourethroscopy   • ENDOSCOPY AND COLONOSCOPY  04/2014    No polyps. Minimal diverticulosis (sigmoid). Dr Louise   • HEMORRHOIDECTOMY  2005   • HYSTERECTOMY     • OOPHORECTOMY           Family History   Problem Relation Age of Onset   • Lung cancer Father    • Heart disease Other          Social History     Socioeconomic History   • Marital status:    Tobacco Use   • Smoking status: Never   • Smokeless tobacco: Never   Substance and Sexual Activity   • Alcohol use: No   • Drug use: No   • Sexual activity: Defer         Current Outpatient Medications   Medication Sig Dispense Refill   • busPIRone (BUSPAR) 10 MG tablet Take 1 tablet by mouth twice daily 180 tablet 3   • Calcium Carbonate-Vit D-Min (CALTRATE 600+D PLUS PO) Take 1 tablet by mouth 2 (Two) Times a Day.     • celecoxib (CeleBREX) 100 MG capsule Take 1 capsule by mouth Daily. With food 10 capsule 0   • CLOBETASOL PROPIONATE EMULSION EX Apply  topically.     • dilTIAZem CD (CARDIZEM CD) 240 MG 24 hr capsule Take 1 capsule by mouth Daily. 90 capsule 3   • fluticasone (FLONASE) 50 MCG/ACT nasal spray Use 2 spray(s) in each nostril once daily 16 g 5   • ketoconazole (NIZORAL) 2 % shampoo Apply  topically to the appropriate area as directed 2 (Two) Times a Week.     • loratadine (CLARITIN) 10 MG tablet Take 10 mg by mouth Daily.     • omeprazole (priLOSEC) 40 MG capsule Take 1 capsule by mouth once daily in the morning 90 capsule 1   • ondansetron (Zofran) 4 MG tablet Take 1 tablet by mouth Every 8 (Eight) Hours As Needed for Nausea or Vomiting. 20 tablet 0   • PARoxetine (PAXIL) 20 MG tablet TAKE 1 TABLET BY MOUTH ONCE DAILY AT BEDTIME 90 tablet 1   •  "Psyllium (METAMUCIL FIBER PO) Take  by mouth.     • tiZANidine (ZANAFLEX) 4 MG tablet 1/2-1 qhs prn muscle spasms 30 tablet 3     No current facility-administered medications for this visit.           Review of Systems:   Today dated 11/30/2022 no significant change was noted in the review of system  Constitutional:  Denies recent weight loss, weight gain,no change in exercise tolerance.     HENT:  Denies any hearing loss, epistaxis, hoarseness  Eyes: No blurred  Respiratory:  Denies dyspnea with exertion,no cough,     Cardiovascular: See H&P    Gastrointestinal: Gastroesophageal reflux disease     Endocrine: Negative for cold intolerance, heat intolerance, polydipsia, polyphagia and polyuria.     Genitourinary: Negative.      Musculoskeletal: Positive for osteoarthritis    Skin:  Denies rashes, or skin lesions.     Allergic/Immunologic: Negative.  Negative for environmental allergies,    Neurological:  Denies any history of recurrent headaches, strokes,    Hematological: Denies any food allergies, seasonal allergies, bleeding disorders,    Psychiatric/Behavioral: Denies any history of depression      OBJECTIVE:    /60 (BP Location: Left arm, Patient Position: Sitting, Cuff Size: Adult)   Pulse 75   Ht 160 cm (63\")   Wt 57.2 kg (126 lb)   SpO2 93%   BMI 22.32 kg/m²       Physical Exam:     Constitutional: Cooperative, alert and oriented, well-developed, well-nourished, in no acute distress.     HENT:   Head: Normocephalic conjunctive is pink thyroid is nonpalpable no jugular is distention    Cardiovascular: Regular rhythm, S1 and S2 normal, no S3 or S4. Apical impulse not displaced.  Positive murmur at the base of the heart    Pulmonary/Chest: Chest: No rales no wheezing good bilateral air entry    Abdominal: Abdomen soft, bowel sounds normoactive, no masses,    Musculoskeletal: Straight peripheral pulses normal d.     Neurological: No gross motor or sensory deficits noted, affect appropriate, oriented to " time, person, place.     Skin: Warm and dry to the touch, no apparent skin lesions or masses noted.     Psychiatric: She has a normal mood and affect. Her behavior is normal. Judgment and thought content normal.         Procedures      Lab Results   Component Value Date    WBC 6.45 07/19/2022    HGB 12.2 07/19/2022    HCT 37.8 07/19/2022    MCV 97.2 (H) 07/19/2022     07/19/2022     Lab Results   Component Value Date    GLUCOSE 100 (H) 07/19/2022    BUN 11 07/19/2022    CREATININE 0.74 07/19/2022    EGFRIFNONA 76 07/12/2021    BCR 14.9 07/19/2022    CO2 29.0 07/19/2022    CALCIUM 9.6 07/19/2022    ALBUMIN 4.10 07/19/2022    AST 22 07/19/2022    ALT 12 07/19/2022     Lab Results   Component Value Date    CHOL 253 (H) 07/19/2022    CHOL 279 (H) 07/12/2021    CHOL 246 (H) 03/20/2020     Lab Results   Component Value Date    TRIG 101 07/19/2022    TRIG 138 07/12/2021    TRIG 84 03/20/2020     Lab Results   Component Value Date     (H) 07/19/2022     (H) 07/12/2021     (H) 03/20/2020     No components found for: LDLCALC  Lab Results   Component Value Date     (H) 07/19/2022     (H) 07/12/2021     (H) 03/20/2020     No results found for: HDLLDLRATIO  No components found for: CHOLHDL  No results found for: HGBA1C  Lab Results   Component Value Date    TSH 3.270 07/19/2022           ASSESSMENT AND PLAN:    #1 supraventricular tachycardia status post EP study and ablation continue diltiazem    Patient history of supraventricular tachycardia s/p EP study ablation pleased with clinical outcome.  No further recurrence.       #2 moderate mitral, mild aortic   Clinically there is no progression risk.  Echocardiogram moderate mitral regurgitations.  Similar to the previous no surgical indication at this stage        history of chest pain atypical with a normal cardia catheterizations     Hyperlipidemia continue statin    I spent 16 minutes caring for Supriya on this date of service.  This time includes time spent by me of counseling/coordination of care as relates to the presenting problem and any ordered procedures/tests as outlined above.         This document has been electronically signed by Natalia Aguirre MD on November 30, 2022 10:04 CST      Diagnoses and all orders for this visit:    1. SVT (supraventricular tachycardia) (HCC) (Primary)    2. Essential hypertension  -     ECG 12 Lead    3. Mixed hyperlipidemia    4. Nonrheumatic aortic valve insufficiency    5. Nonrheumatic mitral valve regurgitation          Natalia Aguirre MD  11/30/2022  09:57 CST

## 2022-12-02 LAB
QT INTERVAL: 404 MS
QTC INTERVAL: 451 MS

## 2023-01-11 RX ORDER — OMEPRAZOLE 40 MG/1
CAPSULE, DELAYED RELEASE ORAL
Qty: 90 CAPSULE | Refills: 3 | Status: SHIPPED | OUTPATIENT
Start: 2023-01-11

## 2023-01-11 RX ORDER — PAROXETINE HYDROCHLORIDE 20 MG/1
TABLET, FILM COATED ORAL
Qty: 90 TABLET | Refills: 3 | Status: SHIPPED | OUTPATIENT
Start: 2023-01-11

## 2023-05-02 ENCOUNTER — OFFICE VISIT (OUTPATIENT)
Dept: FAMILY MEDICINE CLINIC | Facility: CLINIC | Age: 78
End: 2023-05-02
Payer: MEDICARE

## 2023-05-02 VITALS
TEMPERATURE: 97.2 F | HEART RATE: 60 BPM | BODY MASS INDEX: 22.43 KG/M2 | SYSTOLIC BLOOD PRESSURE: 136 MMHG | HEIGHT: 63 IN | DIASTOLIC BLOOD PRESSURE: 64 MMHG | WEIGHT: 126.6 LBS | OXYGEN SATURATION: 97 %

## 2023-05-02 DIAGNOSIS — M81.0 AGE-RELATED OSTEOPOROSIS WITHOUT CURRENT PATHOLOGICAL FRACTURE: Chronic | ICD-10-CM

## 2023-05-02 DIAGNOSIS — M25.551 RIGHT HIP PAIN: ICD-10-CM

## 2023-05-02 DIAGNOSIS — M54.16 LEFT LUMBAR RADICULOPATHY: ICD-10-CM

## 2023-05-02 DIAGNOSIS — K21.9 GASTROESOPHAGEAL REFLUX DISEASE, UNSPECIFIED WHETHER ESOPHAGITIS PRESENT: Chronic | ICD-10-CM

## 2023-05-02 DIAGNOSIS — M62.830 MUSCLE SPASM OF BACK: ICD-10-CM

## 2023-05-02 DIAGNOSIS — M54.16 RIGHT LUMBAR RADICULOPATHY: Primary | ICD-10-CM

## 2023-05-02 DIAGNOSIS — M16.0 PRIMARY OSTEOARTHRITIS OF BOTH HIPS: Chronic | ICD-10-CM

## 2023-05-02 RX ORDER — TRIAMCINOLONE ACETONIDE 40 MG/ML
20 INJECTION, SUSPENSION INTRA-ARTICULAR; INTRAMUSCULAR ONCE
Status: COMPLETED | OUTPATIENT
Start: 2023-05-02 | End: 2023-05-02

## 2023-05-02 RX ORDER — CELECOXIB 100 MG/1
100 CAPSULE ORAL DAILY
Qty: 10 CAPSULE | Refills: 0 | Status: SHIPPED | OUTPATIENT
Start: 2023-05-02

## 2023-05-02 RX ORDER — FAMOTIDINE 20 MG/1
20 TABLET, FILM COATED ORAL EVERY EVENING
Qty: 30 TABLET | Refills: 3 | Status: SHIPPED | OUTPATIENT
Start: 2023-05-02

## 2023-05-02 RX ORDER — DILTIAZEM HYDROCHLORIDE 180 MG/1
1 CAPSULE, COATED, EXTENDED RELEASE ORAL DAILY
COMMUNITY
Start: 2023-04-10

## 2023-05-02 RX ORDER — METHYLPREDNISOLONE ACETATE 80 MG/ML
80 INJECTION, SUSPENSION INTRA-ARTICULAR; INTRALESIONAL; INTRAMUSCULAR; SOFT TISSUE ONCE
Status: COMPLETED | OUTPATIENT
Start: 2023-05-02 | End: 2023-05-02

## 2023-05-02 RX ADMIN — TRIAMCINOLONE ACETONIDE 20 MG: 40 INJECTION, SUSPENSION INTRA-ARTICULAR; INTRAMUSCULAR at 11:18

## 2023-05-02 RX ADMIN — METHYLPREDNISOLONE ACETATE 80 MG: 80 INJECTION, SUSPENSION INTRA-ARTICULAR; INTRALESIONAL; INTRAMUSCULAR; SOFT TISSUE at 11:17

## 2023-05-02 NOTE — PROGRESS NOTES
"Chief Complaint  Back Pain (Lumbar and radiates to right groin and leg. States left hip is starting to hurt. Started 1-2 days ago. She fell 3 weeks ago but states didn't fall on that side )    Subjective        History of Present Illness     Supriya Alberto presents to the office describing symptoms of right lumbar radiculopthy and muscle spasm with some lesser symptoms of left radiculopathy.  She reports 2-day history of lower right back radiating to right thigh, mostly anteriorally and lateral, and is starting to radiate to a lesser extent to left lower extremity.  She had a recent fall when she hung her toe on kitchen cabinet and fell face forward, but doesn't feel the fall is related to her pain.  She takes 2 Tylenol Arthritis tablets each morning.  She states taking any more daily Tylenol flares GERD symptoms despite taking Prilosec 40 mg each morning.  She took 1/2 tablet of Zanaflex 4 mg last night.  She is able to sleep at night.         Most recent DEXA 06/2022 revealed new osteoporosis of the hip and mild osteopenia of the lumbar spine. She could not tolerate monthly Boniva due to myalgias and continues IV Reclast and calcium/vitamin D supplements.  Next Reclast scheduled for August.      Objective   Vital Signs:  /64   Pulse 60   Temp 97.2 °F (36.2 °C)   Ht 160 cm (63\")   Wt 57.4 kg (126 lb 9.6 oz)   SpO2 97%   BMI 22.43 kg/m²   Estimated body mass index is 22.43 kg/m² as calculated from the following:    Height as of this encounter: 160 cm (63\").    Weight as of this encounter: 57.4 kg (126 lb 9.6 oz).       BMI is within normal parameters. No other follow-up for BMI required.      Physical Exam  Vitals reviewed.   Constitutional:       General: She is not in acute distress.     Appearance: She is well-developed.      Comments: Pleasant female. Accompanied by .    HENT:      Head: Normocephalic and atraumatic.      Nose:      Right Sinus: No maxillary sinus tenderness or frontal sinus " tenderness.      Left Sinus: No maxillary sinus tenderness or frontal sinus tenderness.      Mouth/Throat:      Mouth: No oral lesions.      Pharynx: Uvula midline.      Tonsils: No tonsillar exudate.   Eyes:      Conjunctiva/sclera: Conjunctivae normal.      Pupils: Pupils are equal, round, and reactive to light.   Neck:      Thyroid: No thyroid mass or thyromegaly.      Vascular: No carotid bruit or JVD.      Trachea: Trachea normal. No tracheal deviation.   Cardiovascular:      Rate and Rhythm: Normal rate and regular rhythm.  No extrasystoles are present.     Chest Wall: PMI is not displaced.      Heart sounds: Normal heart sounds. No murmur heard.  Pulmonary:      Effort: Pulmonary effort is normal. No accessory muscle usage or respiratory distress.      Breath sounds: Normal breath sounds. No decreased breath sounds, wheezing, rhonchi or rales.   Abdominal:      General: There is no distension.      Palpations: Abdomen is soft.   Musculoskeletal:      Cervical back: Neck supple.      Comments: Musculoskeletal exam reveals tenderness just lateral to right SI joint and paraspinal muscle spasm, right greater than left, in lumbar region. No lumbar vertebral tenderness, although, she has tenderness to palpation of sacrum.      Lymphadenopathy:      Cervical: No cervical adenopathy.   Skin:     General: Skin is warm and dry.      Findings: No rash.      Nails: There is no clubbing.   Neurological:      Mental Status: She is alert and oriented to person, place, and time. Mental status is at baseline.      Cranial Nerves: No cranial nerve deficit.      Motor: No weakness.      Coordination: Coordination normal.   Psychiatric:         Mood and Affect: Mood normal.         Speech: Speech normal.         Behavior: Behavior normal.         Thought Content: Thought content normal.         Judgment: Judgment normal.            Result Review :    Common labs        6/24/2022    13:13 7/19/2022    08:40   Common Labs    Glucose  100     Glucose 99         BUN 12      11     Creatinine 0.7      0.74     Sodium 131      140     Potassium 4.3      4.1     Chloride 99      104     Calcium 9.3      9.6     Albumin 3.4      4.10     Total Bilirubin 0.40      0.5     Alkaline Phosphatase 72      88     AST (SGOT) 27      22     ALT (SGPT) 19      12     WBC  6.45     Hemoglobin  12.2     Hematocrit  37.8     Platelets  329     Total Cholesterol  253     Triglycerides  101     HDL Cholesterol  117     LDL Cholesterol   119         This result is from an external source.     Data reviewed: Radiologic studies DEXA 06/2022 and x-rays today    Future Appointments   Date Time Provider Department Center   8/2/2023 10:30 AM Feliz Valiente MD [facility]OW [facility]   8/7/2023 10:30 AM  [facility] 10  [facility] OPI [facility]   12/5/2023  9:00 AM Natalia Aguirre MD [facility] None              Assessment and Plan   Diagnoses and all orders for this visit:    1. Right lumbar radiculopathy (Primary)  -     XR Spine Lumbar Complete 4+VW  -     XR Hips Bilateral With or Without Pelvis 3-4 View  -     methylPREDNISolone acetate (DEPO-medrol) injection 80 mg  -     triamcinolone acetonide (KENALOG-40) injection 20 mg  -     Cancel: Ambulatory Referral to Physical Therapy Evaluate and treat  -     Ambulatory Referral to Physical Therapy Evaluate and treat    2. Left lumbar radiculopathy  -     XR Spine Lumbar Complete 4+VW  -     XR Hips Bilateral With or Without Pelvis 3-4 View  -     methylPREDNISolone acetate (DEPO-medrol) injection 80 mg  -     triamcinolone acetonide (KENALOG-40) injection 20 mg  -     Cancel: Ambulatory Referral to Physical Therapy Evaluate and treat  -     Ambulatory Referral to Physical Therapy Evaluate and treat    3. Muscle spasm of back    4. Right hip pain  -     XR Hips Bilateral With or Without Pelvis 3-4 View    5. Gastroesophageal reflux disease, unspecified whether esophagitis present    6. Age-related osteoporosis without  current pathological fracture    7. Primary osteoarthritis of both hips  -     Ambulatory Referral to Physical Therapy Evaluate and treat    Other orders  -     famotidine (PEPCID) 20 MG tablet; Take 1 tablet by mouth Every Evening. Before supper  Dispense: 30 tablet; Refill: 3  -     celecoxib (CeleBREX) 100 MG capsule; Take 1 capsule by mouth Daily. With food  Dispense: 10 capsule; Refill: 0           I spent 31 minutes caring for Supriya on this date of service. This time includes time spent by me in the following activities:preparing for the visit, reviewing tests, obtaining and/or reviewing a separately obtained history, performing a medically appropriate examination and/or evaluation , counseling and educating the patient/family/caregiver, ordering medications, tests, or procedures and documenting information in the medical record     Orders placed for patient to stop by for x-rays of lumbar spine and bilateral hips.  We will notify patient with results.  To me, it appears that there are some mild/moderate arthritic and degenerative changes particularly of the lower lumbar spine as well as bilateral hips.  Both low back and hip issues may be producing some of her pain.  For the mostly right lumbar radiculopathy with some lesser symptoms of left lumbar radiculopathy and muscle spasm, prescriptions sent for Celebrex 100 mg to take one q.d. with food.  I also sent prescription for Pepcid 20 mg to take one tablet in the evening in addition to the Prilosec 40 mg q.a.m. to help her tolerate the low dose NSAID.  She can continue the Tylenol Arthritis 2 tablets each morning and repeat 2 tablets in the evening if tolerated.  Continue the Zanaflex 4 mg 1/2 to 1 tablet q.h.s. for muscle spasm.  Refer to [facility] for PT.  Continue the Prilosec 40 mg q.a.m.  After informed verbal consent, patient is given Kenalog 20 mg and Depo-Medrol 80 mg IM without difficulty or complications.  Patient tolerated well without  complications.       Continue omeprazole 40 mg every morning for GERD.  To allow her to tolerate additional medication temporarily to address the current pain issues, temporarily add Pepcid 20 mg prior to supper.  Continue to watch for GI intolerance while taking Celebrex and Tylenol.  Surprisingly, even Tylenol seems to aggravate her upper GI symptoms if she is not careful.    Continue IV Reclast and vitamin D and calcium supplements for osteoporosis.  Next Reclast scheduled for August.      Return 08/02/2023 for routine follow up with fasting labs one week prior or sooner if needed.      Scribed for Dr. Valiente by Samina Field [facility].     Follow Up   Return if symptoms worsen or fail to improve, for Next scheduled follow up.  Patient was given instructions and counseling regarding her condition or for health maintenance advice. Please see specific information pulled into the AVS if appropriate.

## 2023-05-03 ENCOUNTER — TELEPHONE (OUTPATIENT)
Dept: FAMILY MEDICINE CLINIC | Facility: CLINIC | Age: 78
End: 2023-05-03
Payer: MEDICARE

## 2023-05-03 NOTE — TELEPHONE ENCOUNTER
I read Dr Valiente's message to the patient. The patient voiced understanding, had no additional questions, and thanked me for the call.

## 2023-05-03 NOTE — TELEPHONE ENCOUNTER
----- Message from Feliz Valiente MD sent at 5/3/2023 12:23 PM CDT -----  Please inform Supriya that her x-rays reveal moderate arthritic and degenerative changes of both hips as well as mild multilevel arthritic and degenerative changes of the lumbar spine.  We have referred her to physical therapy.  EB

## 2023-07-26 ENCOUNTER — LAB (OUTPATIENT)
Dept: LAB | Facility: OTHER | Age: 78
End: 2023-07-26
Payer: MEDICARE

## 2023-07-26 DIAGNOSIS — M81.0 AGE-RELATED OSTEOPOROSIS WITHOUT CURRENT PATHOLOGICAL FRACTURE: ICD-10-CM

## 2023-07-26 DIAGNOSIS — K21.9 GASTROESOPHAGEAL REFLUX DISEASE, UNSPECIFIED WHETHER ESOPHAGITIS PRESENT: Chronic | ICD-10-CM

## 2023-07-26 DIAGNOSIS — R53.83 FATIGUE, UNSPECIFIED TYPE: ICD-10-CM

## 2023-07-26 DIAGNOSIS — E78.2 MIXED HYPERLIPIDEMIA: Chronic | ICD-10-CM

## 2023-07-26 DIAGNOSIS — I10 ESSENTIAL HYPERTENSION: Chronic | ICD-10-CM

## 2023-07-26 LAB
25(OH)D3 SERPL-MCNC: 46 NG/ML (ref 30–100)
ALBUMIN SERPL-MCNC: 4.3 G/DL (ref 3.5–5)
ALBUMIN/GLOB SERPL: 1.2 G/DL (ref 1.1–1.8)
ALP SERPL-CCNC: 87 U/L (ref 38–126)
ALT SERPL W P-5'-P-CCNC: 15 U/L
ANION GAP SERPL CALCULATED.3IONS-SCNC: 7 MMOL/L (ref 5–15)
AST SERPL-CCNC: 22 U/L (ref 14–36)
BASOPHILS # BLD AUTO: 0.14 10*3/MM3 (ref 0–0.2)
BASOPHILS NFR BLD AUTO: 2.7 % (ref 0–1.5)
BILIRUB SERPL-MCNC: 0.5 MG/DL (ref 0.2–1.3)
BUN SERPL-MCNC: 15 MG/DL (ref 7–23)
BUN/CREAT SERPL: 16.3 (ref 7–25)
CALCIUM SPEC-SCNC: 10.1 MG/DL (ref 8.4–10.2)
CHLORIDE SERPL-SCNC: 100 MMOL/L (ref 101–112)
CHOLEST SERPL-MCNC: 263 MG/DL (ref 150–200)
CO2 SERPL-SCNC: 31 MMOL/L (ref 22–30)
CREAT SERPL-MCNC: 0.92 MG/DL (ref 0.52–1.04)
DEPRECATED RDW RBC AUTO: 46.3 FL (ref 37–54)
EGFRCR SERPLBLD CKD-EPI 2021: 64.7 ML/MIN/1.73
EOSINOPHIL # BLD AUTO: 0.15 10*3/MM3 (ref 0–0.4)
EOSINOPHIL NFR BLD AUTO: 2.9 % (ref 0.3–6.2)
ERYTHROCYTE [DISTWIDTH] IN BLOOD BY AUTOMATED COUNT: 13.1 % (ref 12.3–15.4)
GLOBULIN UR ELPH-MCNC: 3.7 GM/DL (ref 2.3–3.5)
GLUCOSE SERPL-MCNC: 94 MG/DL (ref 70–99)
HCT VFR BLD AUTO: 39.9 % (ref 34–46.6)
HDLC SERPL-MCNC: 119 MG/DL (ref 40–59)
HGB BLD-MCNC: 13.1 G/DL (ref 12–15.9)
LDLC SERPL CALC-MCNC: 134 MG/DL
LDLC/HDLC SERPL: 1.1 {RATIO} (ref 0–3.22)
LYMPHOCYTES # BLD AUTO: 1.63 10*3/MM3 (ref 0.7–3.1)
LYMPHOCYTES NFR BLD AUTO: 31.1 % (ref 19.6–45.3)
MCH RBC QN AUTO: 32.3 PG (ref 26.6–33)
MCHC RBC AUTO-ENTMCNC: 32.8 G/DL (ref 31.5–35.7)
MCV RBC AUTO: 98.5 FL (ref 79–97)
MONOCYTES # BLD AUTO: 0.67 10*3/MM3 (ref 0.1–0.9)
MONOCYTES NFR BLD AUTO: 12.8 % (ref 5–12)
NEUTROPHILS NFR BLD AUTO: 2.65 10*3/MM3 (ref 1.7–7)
NEUTROPHILS NFR BLD AUTO: 50.5 % (ref 42.7–76)
PLATELET # BLD AUTO: 349 10*3/MM3 (ref 140–450)
PMV BLD AUTO: 9 FL (ref 6–12)
POTASSIUM SERPL-SCNC: 4.5 MMOL/L (ref 3.4–5)
PROT SERPL-MCNC: 8 G/DL (ref 6.3–8.6)
RBC # BLD AUTO: 4.05 10*6/MM3 (ref 3.77–5.28)
SODIUM SERPL-SCNC: 138 MMOL/L (ref 137–145)
TRIGL SERPL-MCNC: 65 MG/DL
TSH SERPL DL<=0.05 MIU/L-ACNC: 2.96 UIU/ML (ref 0.27–4.2)
VIT B12 BLD-MCNC: 291 PG/ML (ref 211–946)
VLDLC SERPL-MCNC: 10 MG/DL (ref 5–40)
WBC NRBC COR # BLD: 5.24 10*3/MM3 (ref 3.4–10.8)

## 2023-07-26 PROCEDURE — 36415 COLL VENOUS BLD VENIPUNCTURE: CPT | Performed by: INTERNAL MEDICINE

## 2023-07-26 PROCEDURE — 80053 COMPREHEN METABOLIC PANEL: CPT | Performed by: INTERNAL MEDICINE

## 2023-07-26 PROCEDURE — 80061 LIPID PANEL: CPT | Performed by: INTERNAL MEDICINE

## 2023-07-26 PROCEDURE — 84443 ASSAY THYROID STIM HORMONE: CPT | Performed by: INTERNAL MEDICINE

## 2023-07-26 PROCEDURE — 82607 VITAMIN B-12: CPT | Performed by: INTERNAL MEDICINE

## 2023-07-26 PROCEDURE — 82306 VITAMIN D 25 HYDROXY: CPT | Performed by: INTERNAL MEDICINE

## 2023-07-26 PROCEDURE — 85025 COMPLETE CBC W/AUTO DIFF WBC: CPT | Performed by: INTERNAL MEDICINE

## 2023-08-02 ENCOUNTER — OFFICE VISIT (OUTPATIENT)
Dept: FAMILY MEDICINE CLINIC | Facility: CLINIC | Age: 78
End: 2023-08-02
Payer: MEDICARE

## 2023-08-02 VITALS
WEIGHT: 123 LBS | OXYGEN SATURATION: 99 % | SYSTOLIC BLOOD PRESSURE: 128 MMHG | HEIGHT: 63 IN | DIASTOLIC BLOOD PRESSURE: 62 MMHG | HEART RATE: 69 BPM | TEMPERATURE: 97.6 F | BODY MASS INDEX: 21.79 KG/M2

## 2023-08-02 DIAGNOSIS — M81.0 AGE-RELATED OSTEOPOROSIS WITHOUT CURRENT PATHOLOGICAL FRACTURE: Chronic | ICD-10-CM

## 2023-08-02 DIAGNOSIS — M54.16 RIGHT LUMBAR RADICULOPATHY: ICD-10-CM

## 2023-08-02 DIAGNOSIS — R53.83 FATIGUE, UNSPECIFIED TYPE: ICD-10-CM

## 2023-08-02 DIAGNOSIS — I10 ESSENTIAL HYPERTENSION: Chronic | ICD-10-CM

## 2023-08-02 DIAGNOSIS — K21.9 GASTROESOPHAGEAL REFLUX DISEASE, UNSPECIFIED WHETHER ESOPHAGITIS PRESENT: Chronic | ICD-10-CM

## 2023-08-02 DIAGNOSIS — Z23 NEED FOR SHINGLES VACCINE: ICD-10-CM

## 2023-08-02 DIAGNOSIS — I47.1 SVT (SUPRAVENTRICULAR TACHYCARDIA): Chronic | ICD-10-CM

## 2023-08-02 DIAGNOSIS — R00.2 PALPITATIONS: ICD-10-CM

## 2023-08-02 DIAGNOSIS — E78.2 MIXED HYPERLIPIDEMIA: Chronic | ICD-10-CM

## 2023-08-02 DIAGNOSIS — Z00.00 MEDICARE ANNUAL WELLNESS VISIT, SUBSEQUENT: Primary | ICD-10-CM

## 2023-08-02 RX ORDER — SODIUM CHLORIDE 9 MG/ML
250 INJECTION, SOLUTION INTRAVENOUS ONCE
OUTPATIENT
Start: 2023-08-02

## 2023-08-09 RX ORDER — BUSPIRONE HYDROCHLORIDE 10 MG/1
TABLET ORAL
Qty: 180 TABLET | Refills: 3 | Status: SHIPPED | OUTPATIENT
Start: 2023-08-09